# Patient Record
Sex: MALE | Race: WHITE | NOT HISPANIC OR LATINO | Employment: FULL TIME | URBAN - METROPOLITAN AREA
[De-identification: names, ages, dates, MRNs, and addresses within clinical notes are randomized per-mention and may not be internally consistent; named-entity substitution may affect disease eponyms.]

---

## 2017-03-18 ENCOUNTER — HOSPITAL ENCOUNTER (EMERGENCY)
Facility: CLINIC | Age: 32
Discharge: HOME | End: 2017-03-18
Attending: EMERGENCY MEDICINE | Admitting: EMERGENCY MEDICINE
Payer: COMMERCIAL

## 2017-03-18 VITALS
SYSTOLIC BLOOD PRESSURE: 144 MMHG | TEMPERATURE: 97.8 F | BODY MASS INDEX: 25.43 KG/M2 | HEART RATE: 103 BPM | DIASTOLIC BLOOD PRESSURE: 73 MMHG | RESPIRATION RATE: 18 BRPM | HEIGHT: 67 IN | WEIGHT: 162 LBS | OXYGEN SATURATION: 99 %

## 2017-03-18 DIAGNOSIS — F41.0 PANIC ATTACK: ICD-10-CM

## 2017-03-18 LAB — INTERPRETATION ECG - MUSE: NORMAL

## 2017-03-18 PROCEDURE — 99283 EMERGENCY DEPT VISIT LOW MDM: CPT

## 2017-03-18 PROCEDURE — 93005 ELECTROCARDIOGRAM TRACING: CPT

## 2017-03-18 ASSESSMENT — ENCOUNTER SYMPTOMS
VOICE CHANGE: 0
TROUBLE SWALLOWING: 0
SHORTNESS OF BREATH: 1
APNEA: 0
NERVOUS/ANXIOUS: 1

## 2017-03-18 NOTE — ED AVS SNAPSHOT
Emergency Department    [address] [facility] [address]    Phone:  [phone]    Fax:  [phone]                                       Balwinder Perez   MRN: [id number]    Department:   Emergency Department   Date of Visit:  3/18/2017           Patient Information     Date Of Birth          1985        Your diagnoses for this visit were:     Panic attack        You were seen by Jess Henry MD.      Follow-up Information     Follow up with  Emergency Department.    Specialty:  EMERGENCY MEDICINE    Why:  immediately , If symptoms worsen    Contact information:    [address] [facility] [id number] [phone]        Follow up with Magalis Levy DO.    Specialty:  Family Practice    Why:  As needed    Contact information:    [address] [address]  [phone]          Discharge Instructions         Anxiety Reaction  Anxiety is the feeling we all get when we think something bad might happen. It is a normal response to stress and usually causes only a mild reaction. When anxiety becomes more severe, it can interfere with daily life. In some cases, you may not even be aware of what it is you re anxious about. There may also be a genetic link or it may be a learned behavior in the home.  Both psychological and physical triggers cause stress reaction. It's often a response to fear or emotional stress, real or imagined. This stress may come from home, family, work, or social relationships.  During an anxiety reaction, you may feel:    Helpless    Nervous    Depressed    Irritable  Your body may show signs of anxiety in many ways. You may experience:    Dry mouth    Shakiness    Dizziness    Weakness    Trouble breathing    Breathing fast (hyperventilating)    Chest pressure    Sweating    Headache    Nausea    Diarrhea    Tiredness    Inability to sleep    Sexual problems  Home care    Try to locate the  sources of stress in your life. They may not be obvious. These may include:    Daily hassles of life (traffic jams, missed appointments, car troubles, etc.)    Major life changes, both good (new baby, job promotion) and bad (loss of job, loss of loved one)    Overload: feeling that you have too many responsibilities and can't take care of all of them at once    Feeling helpless, feeling that your problems are beyond what you re able to solve    Notice how your body reacts to stress. Learn to listen to your body signals. This will help you take action before the stress becomes severe.    When you can, do something about the source of your stress. (Avoid hassles, limit the amount of change that happens in your life at one time and take a break when you feel overloaded).    Unfortunately, many stressful situations can't be avoided. It is necessary to learn how to better manage stress. There are many proven methods that will reduce your anxiety. These include simple things like exercise, good nutrition and adequate rest. Also, there are certain techniques that are helpful:    Relaxation    Breathing exercises    Visualization    Biofeedback    Meditation  For more information about this, consult your doctor or go to a local bookstore and review the many books and tapes available on this subject.  Follow-up care  If you feel that your anxiety is not responding to self-help measures, contact your doctor or make an appointment with a counselor. You may need short-term psychological counseling and temporary medicine to help you manage stress.  Call 911  Call your healthcare provider right away if any of these occur:    Trouble breathing    Confusion    Drowsiness or trouble wakening    Fainting or loss of consciousness    Rapid heart rate    Seizure    New chest pain that becomes more severe, lasts longer, or spreads into your shoulder, arm, neck, jaw, or back  When to seek medical advice  Call your healthcare provider  right away if any of these occur:    Your symptoms get worse    Severe headache not relieved by rest and mild pain reliever    3761-9176 [employer]. [address] [place] [address], [place] [address]. All rights reserved. This information is not intended as a substitute for professional medical care. Always follow your healthcare professional's instructions.    YOU CAN TRY TAKING AFRIN (OXYMETAZOLINE) FOR YOUR EAR SYMPTOMS          24 Hour Appointment Hotline       To make an appointment at any [facility], call [phone] ([phone]). If you don't have a family doctor or clinic, we will help you find one. [facility] clinics are conveniently located to serve the needs of you and your family.             Review of your medicines      Our records show that you are taking the medicines listed below. If these are incorrect, please call your family doctor or clinic.        Dose / Directions Last dose taken    Multi-vitamin Tabs tablet   Dose:  1 tablet        Take 1 tablet by mouth daily   Refills:  0                Procedures and tests performed during your visit     EKG 12 lead      Orders Needing Specimen Collection     None      Pending Results     Date and Time Order Name Status Description    3/18/2017 2025 EKG 12 lead Preliminary             Pending Culture Results     No orders found from 3/16/2017 to 3/19/2017.             Test Results from your hospital stay            Clinical Quality Measure: Blood Pressure Screening     Your blood pressure was checked while you were in the emergency department today. The last reading we obtained was  BP: 144/73 . Please read the guidelines below about what these numbers mean and what you should do about them.  If your systolic blood pressure (the top number) is less than 120 and your diastolic blood pressure (the bottom number) is less than 80, then your blood pressure is normal. There is nothing more that you need to do about it.  If your systolic blood  pressure (the top number) is 120-139 or your diastolic blood pressure (the bottom number) is 80-89, your blood pressure may be higher than it should be. You should have your blood pressure rechecked within a year by a primary care provider.  If your systolic blood pressure (the top number) is 140 or greater or your diastolic blood pressure (the bottom number) is 90 or greater, you may have high blood pressure. High blood pressure is treatable, but if left untreated over time it can put you at risk for heart attack, stroke, or kidney failure. You should have your blood pressure rechecked by a primary care provider within the next 4 weeks.  If your provider in the emergency department today gave you specific instructions to follow-up with your doctor or provider even sooner than that, you should follow that instruction and not wait for up to 4 weeks for your follow-up visit.        Thank you for choosing [facility]       Thank you for choosing [facility] for your care. Our goal is always to provide you with excellent care. Hearing back from our patients is one way we can continue to improve our services. Please take a few minutes to complete the written survey that you may receive in the mail after you visit with us. Thank you!        [facility] Information     [employer] gives you secure access to your electronic health record. If you see a primary care provider, you can also send messages to your care team and make appointments. If you have questions, please call your primary care clinic.  If you do not have a primary care provider, please call [phone] and they will assist you.        Care EveryWhere ID     This is your Care EveryWhere ID. This could be used by other organizations to access your [facility] medical records  [id number]        After Visit Summary       This is your record. Keep this with you and show to your community pharmacist(s) and doctor(s) at your next visit.

## 2017-03-18 NOTE — ED AVS SNAPSHOT
Emergency Department    [address] [place] [address]    Phone:  [phone]    Fax:  [phone]                                       Balwinder Perez   MRN: [id number]    Department:   Emergency Department   Date of Visit:  3/18/2017           After Visit Summary Signature Page     I have received my discharge instructions, and my questions have been answered. I have discussed any challenges I see with this plan with the nurse or doctor.    ..........................................................................................................................................  Patient/Patient Representative Signature      ..........................................................................................................................................  Patient Representative Print Name and Relationship to Patient    ..................................................               ................................................  Date                                            Time    ..........................................................................................................................................  Reviewed by Signature/Title    ...................................................              ..............................................  Date                                                            Time

## 2017-03-19 NOTE — ED PROVIDER NOTES
History     Chief Complaint:  Allergic Reaction    HPI   Balwinder Perez is a 31 year old male who presents with a possible allergic reaction. The patient states that he was driving, and had a few sips of a Rock[employer] energy drink and some nuts on his way to play soccer.  Suddenly, he developed several symptoms simultaneously.  He felt as though his throat was closing, and felt heart racing as well as lightheadedness.  He also felt short of breath, and developed tingling at the top of his head.  He denies any chest pain or syncope.  He pulled over to the side of road and called 911. The patient contacted EMS and received one dose of IM epi. He did not have rash, itching, or swelling of the mouth, lips or face. He still feels anxious, and felt jittery after the epinephrine.   Otherwise, he has some mild tingling of his head, but otherwise, his symptoms have improved.  He denies any stimulant drug use, or other caffeine today.  He has never had a similar reaction in the past.  [facility], which is a 14 hour time difference.    Allergies:  No Known Drug Allergies      Medications:    The patient is currently on no regular medications.     Past Medical History:    HDL    Past Surgical History:    History reviewed. No pertinent past surgical history.     Family History:    Cancer  Asthma    Social History:  The patient was accompanied to the ED by EMS.  Smoking Status: Former smoker  Smokeless Tobacco: Never used  Alcohol Use: No   Marital Status:   [2]     Review of Systems   HENT: Negative for trouble swallowing and voice change.    Respiratory: Positive for shortness of breath. Negative for apnea.    Cardiovascular: Negative for chest pain.   Skin: Negative for rash.   Psychiatric/Behavioral: The patient is nervous/anxious.    All other systems reviewed and are negative.    Physical Exam   Vitals:  Patient Vitals for the past 24 hrs:   BP Temp Temp src Pulse Heart Rate  "Resp SpO2 Height Weight   03/18/17 2023 144/73 97.8  F (36.6  C) Oral 103 103 22 100 % 1.702 m (5' 7\") 73.5 kg (162 lb)     Physical Exam  Gen: Pleasant, appears stated age.    Eye:   Pupils are equal, round, and reactive.     Sclera non-injected.    ENT:   Moist mucus membranes.     Normal tongue.    Oropharynx without lesions.   Clear TM's bilaterally    Cardiac:     Normal rate and regular rhythm.    No murmurs, gallops, or rubs.    Pulmonary:     Clear to auscultation bilaterally.    No wheezes, rales, or rhonchi.    Abdomen:     Normal active bowel sounds.     Abdomen is soft and non-distended, without focal tenderness.    Musculoskeletal:     Normal movement of all extremities without evidence for deficit.    Extremities:    No edema.    Skin:   Patch of reddened skin over the right scapula, not raised or pruritic.  Otherwise, no rash.    Neurologic:    Non-focal exam without asymmetric weakness or numbness.    Normal tone   Intermittent involuntary jerking motions.    Psychiatric:     Anxious.  Emergency Department Course       EKG demonstrates normal sinus rhythm.  No ST elevation or depression.  Consistent with a normal EKG.    Emergency Department Course:  Nursing notes and vitals reviewed.  I performed an exam of the patient as documented above.   EKG obtained in the ED, see results above.      I discussed the treatment plan with the patient. They expressed understanding of this plan and consented to discharge. They will be discharged home with instructions for care and follow up. In addition, the patient will return to the emergency department if their symptoms persist, worsen, if new symptoms arise or if there is any concern.  All questions were answered.    I personally reviewed the laboratory results with the Patient and answered all related questions prior to discharge.    Impression & Plan      Medical Decision Making:  Balwinder Perez is a 31 year old male who presents to the emergency department today " after a reaction to a Rockstar beverage. On exam, the patient appears somewhat anxious with occasional tremor. Overall, he is well appearing. At this point, I think his symptoms are most consistent with panic attack. This may have been related to the caffeine he ingested with the Rockstar beverage, though it seems that it is a fairly small amount. [place] also [place] and is still adjusting. I do not see any evidence that this was related to an allergic reaction at this time. He also received a dose of epinephrine which likely worsened his anxiety. At this point, given normal EKG, otherwise healthy patient, I think he is safe for discharge home. I recommended avoiding caffeine, resting and trying to return to a regular schedule. He will return with worsening symptoms, chest pain, shortness of breath or other concerns.    Diagnosis:    ICD-10-CM    1. Panic attack F41.0       Disposition:   Discharge to home    Scribe Disclosure:  I, Lawrence Salter, am serving as a scribe at 8:31 PM on 3/18/2017 to document services personally performed by Jess Henry MD, based on my observations and the provider's statements to me.   3/18/2017    EMERGENCY DEPARTMENT       Jess Henry MD  03/19/17 0174

## 2017-03-19 NOTE — ED NOTES
Bed: ED10  Expected date:   Expected time:   Means of arrival:   Comments:  Clementina 2-28M Allergic Reaction

## 2017-03-19 NOTE — ED NOTES
Pt walked to BR and back with ERT without assistance. Pt walked with steady gait and asked for an update on results. Rn notified.

## 2017-03-19 NOTE — DISCHARGE INSTRUCTIONS

## 2017-06-12 ENCOUNTER — OFFICE VISIT (OUTPATIENT)
Dept: FAMILY MEDICINE | Facility: CLINIC | Age: 32
End: 2017-06-12
Payer: COMMERCIAL

## 2017-06-12 VITALS
WEIGHT: 157 LBS | HEIGHT: 67 IN | HEART RATE: 60 BPM | SYSTOLIC BLOOD PRESSURE: 128 MMHG | TEMPERATURE: 97.9 F | DIASTOLIC BLOOD PRESSURE: 72 MMHG | BODY MASS INDEX: 24.64 KG/M2
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
## 2017-06-12 PROCEDURE — 99213 OFFICE O/P EST LOW 20 MIN: CPT | Performed by: FAMILY MEDICINE

## 2017-06-12 NOTE — PROGRESS NOTES
SUBJECTIVE:                                                    Balwinder Perez is a 31 year old male who presents to clinic today for the following health issues:      Concern - tick bite     Onset: noticed a small brown tick last Friday morning.  Not engorged.    Description:   Small red dot on right lower side of rib area/just above hip    Intensity: mild    Progression of Symptoms:  improving    Accompanying Signs & Symptoms:  Small red dot, denies any itching or pain.       Previous history of similar problem:   none    Precipitating factors:   Worsened by: n/a    Alleviating factors:  Improved by: n/a       Therapies Tried and outcome: washed with soap and water.    Additional comments: Patient reports having a 5-10 mm brown wood tick on his right side. He noticed the tick on Friday at 9 am. He states that the tick was not swollen. The day before he was sitting outside on a deck of his mother's home in [place]. He denies any rash or swelling.    Problem list and histories reviewed & adjusted, as indicated.  Additional history: as documented    Patient Active Problem List   Diagnosis     CARDIOVASCULAR SCREENING; LDL GOAL LESS THAN 160     Past Surgical History:   Procedure Laterality Date     NO HISTORY OF SURGERY         Social History   Substance Use Topics     Smoking status: Former Smoker     Quit date: 12/1/2012     Smokeless tobacco: Never Used     Alcohol use No     Family History   Problem Relation Age of Onset     CANCER Father      lung cancer-passed away     Asthma Brother      C.A.D. No family hx of      DIABETES No family hx of      Hypertension No family hx of      CEREBROVASCULAR DISEASE No family hx of          BP Readings from Last 3 Encounters:   06/12/17 128/72   03/18/17 144/73   01/06/16 110/72    Wt Readings from Last 3 Encounters:   06/12/17 157 lb (71.2 kg)   03/18/17 162 lb (73.5 kg)   09/30/14 157 lb 2 oz (71.3 kg)            Reviewed and updated as needed this visit by clinical  "staff  Tobacco  Allergies  Meds  Problems  Med Hx  Surg Hx  Fam Hx  Soc Hx        Reviewed and updated as needed this visit by Provider  Allergies  Meds  Problems         ROS:  Constitutional, HEENT, cardiovascular, pulmonary, GI, , musculoskeletal, neuro, skin, endocrine and psych systems are negative, except as otherwise noted.    This document serves as a record of the services and decisions personally performed and made by Glory Graham DO. It was created on her/his behalf by Evangelina Monroe, a trained medical scribe. The creation of this document is based on the provider's statements to the medical scribe.  Evangelina Monroe June 12, 2017 9:26 AM    OBJECTIVE:                                                    /72 (BP Location: Right arm, Cuff Size: Adult Regular)  Pulse 60  Temp 97.9  F (36.6  C) (Oral)  Ht 5' 7\" (1.702 m)  Wt 157 lb (71.2 kg)  BMI 24.59 kg/m2  Body mass index is 24.59 kg/(m^2).  GENERAL: healthy, alert and no distress  SKIN: 1 mm scab on right side with no rash, non tender  PSYCH: mentation appears normal, affect normal/bright    Diagnostic Test Results:  No results found for this or any previous visit (from the past 24 hour(s)).     ASSESSMENT/PLAN:                                                        ICD-10-CM    1. Tick bite, initial encounter W57.XXXA        I am not concerned about Lyme disease due to the size of the tick and short duration the tick was on the skin. I gave the patient information about Lyme disease transmission.         Glory Graham DO  [facility]    This document serves as a record of the services and decisions personally performed and made by Glory Graham DO. It was created on her behalf by Evangelina Monroe, a trained medical scribe. The creation of this document is based on the provider's statements to the medical scribe.  Evangelina Monroe June 12, 2017 9:26 AM    "

## 2017-06-12 NOTE — PATIENT INSTRUCTIONS
Reassured regarding the likelyhood of transmission of Lyme disease in this setting.   Transmission of Lyme disease is unlikely if the tick is not clearly a larval form or a clearly defined deer tick, if the tick was not engorged or implanted less than 24 hours.  In studies where the tick was on for less than 24 hours the transmission was 0%.   If the tick was likely on more than 24 hrs, and it appears to be a deer tick, in areas of high prevalence of the Lyme bacteria, a single 200mg dose of doxycycline may be indicated to reduce the risk of Lyme disease, if given within 72 hrs of the tick being taken off.

## 2017-06-12 NOTE — MR AVS SNAPSHOT
After Visit Summary   6/12/2017    Balwinder Perez    MRN: [id number]           Patient Information     Date Of Birth          1985        Visit Information        Provider Department      6/12/2017 9:00 AM Glory Graham DO [facility]        Today's Diagnoses     Tick bite, initial encounter    -  1      Care Instructions      Reassured regarding the likelyhood of transmission of Lyme disease in this setting.   Transmission of Lyme disease is unlikely if the tick is not clearly a larval form or a clearly defined deer tick, if the tick was not engorged or implanted less than 24 hours.  In studies where the tick was on for less than 24 hours the transmission was 0%.   If the tick was likely on more than 24 hrs, and it appears to be a deer tick, in areas of high prevalence of the Lyme bacteria, a single 200mg dose of doxycycline may be indicated to reduce the risk of Lyme disease, if given within 72 hrs of the tick being taken off.             Follow-ups after your visit        Your next 10 appointments already scheduled     Jun 26, 2017  8:45 AM CDT   [facility]    [address] [facility] [address]   [phone]            Jun 26, 2017 10:00 AM CDT   [facility]    [address] [facility] [address]   [phone]              Who to contact     If you have questions or need follow up information about today's clinic visit or your schedule please contact [facility] directly at [phone].  Normal or non-critical lab and imaging results will be communicated to you by MyChart, letter or phone within 4 business days after the clinic has received the results. If you do not hear from us within 7 days, please contact the clinic through MyChart or phone. If you have a critical or abnormal  "lab result, we will notify you by phone as soon as possible.  Submit refill requests through [employer] or call your pharmacy and they will forward the refill request to us. Please allow 3 business days for your refill to be completed.          Additional Information About Your Visit        [employer] [facility] Information     [employer] gives you secure access to your electronic health record. If you see a primary care provider, you can also send messages to your care team and make appointments. If you have questions, please call your primary care clinic.  If you do not have a primary care provider, please call [phone] and they will assist you.        Care EveryWhere ID     This is your Care EveryWhere ID. This could be used by other organizations to access your [facility] medical records  [id number]        Your Vitals Were     Pulse Temperature Height BMI (Body Mass Index)          60 97.9  F (36.6  C) (Oral) 5' 7\" (1.702 m) 24.59 kg/m2         Blood Pressure from Last 3 Encounters:   06/12/17 128/72   03/18/17 144/73   01/06/16 110/72    Weight from Last 3 Encounters:   06/12/17 157 lb (71.2 kg)   03/18/17 162 lb (73.5 kg)   09/30/14 157 lb 2 oz (71.3 kg)              Today, you had the following     No orders found for display       Primary Care Provider Office Phone # Fax #    Magalis Levy [phone]       [address] [address]        Thank you!     Thank you for choosing [facility]  for your care. Our goal is always to provide you with excellent care. Hearing back from our patients is one way we can continue to improve our services. Please take a few minutes to complete the written survey that you may receive in the mail after your visit with us. Thank you!             Your Updated Medication List - Protect others around you: Learn how to safely use, store and throw away your medicines at www.disposemymeds.org.          This " list is accurate as of: 6/12/17  9:41 AM.  Always use your most recent med list.                   Brand Name Dispense Instructions for use    Multi-vitamin Tabs tablet      Take 1 tablet by mouth daily

## 2017-06-12 NOTE — NURSING NOTE
"Chief Complaint   Patient presents with     Insect Bites     tick bite       Initial There were no vitals taken for this visit. Estimated body mass index is 25.37 kg/(m^2) as calculated from the following:    Height as of 3/18/17: 5' 7\" (1.702 m).    Weight as of 3/18/17: 162 lb (73.5 kg).  Medication Reconciliation: complete   Shira Patton CMA      "

## 2018-02-03 ENCOUNTER — OFFICE VISIT (OUTPATIENT)
Dept: URGENT CARE | Facility: URGENT CARE | Age: 33
End: 2018-02-03
Payer: COMMERCIAL

## 2018-02-03 VITALS
HEART RATE: 99 BPM | OXYGEN SATURATION: 96 % | TEMPERATURE: 100.8 F | SYSTOLIC BLOOD PRESSURE: 125 MMHG | DIASTOLIC BLOOD PRESSURE: 71 MMHG | WEIGHT: 154.4 LBS | BODY MASS INDEX: 24.18 KG/M2

## 2018-02-03 DIAGNOSIS — J11.1 INFLUENZA-LIKE ILLNESS: Primary | ICD-10-CM

## 2018-02-03 PROCEDURE — 99213 OFFICE O/P EST LOW 20 MIN: CPT | Performed by: PHYSICIAN ASSISTANT

## 2018-02-03 RX ORDER — OMEGA-3 FATTY ACIDS/FISH OIL 300-1000MG
400 CAPSULE ORAL EVERY 4 HOURS PRN
Qty: 40 CAPSULE | Refills: 0 | Status: SHIPPED | OUTPATIENT
Start: 2018-02-03 | End: 2021-07-13

## 2018-02-03 ASSESSMENT — ENCOUNTER SYMPTOMS
NEUROLOGICAL NEGATIVE: 1
GASTROINTESTINAL NEGATIVE: 1
EYES NEGATIVE: 1
PSYCHIATRIC NEGATIVE: 1
CARDIOVASCULAR NEGATIVE: 1
MUSCULOSKELETAL NEGATIVE: 1

## 2018-02-03 NOTE — PROGRESS NOTES
SUBJECTIVE:   Balwinder Perez is a 32 year old male presenting with a chief complaint of   Chief Complaint   Patient presents with     Flu     Patient complains of flu symptoms   .    Onset of symptoms was 4 day(s) ago.  Course of illness is worsening.    Severity moderate  Current and Associated symptoms: fever, cough,chills, stuffy nose, headache  Treatment measures tried include Tylenol/Ibuprofen, cough medicine.  Predisposing factors include None.    This started with symptoms on Tuesday night  Wednesday he could not sleep due to fever/chills and cough  Runny nose and headache  He had a sore throat  He is drinking plenty of fluids  He is using a cough syrup, cough drops and Tylenol  He is exposed to sick coworkers  Fever improves some with Tylenol  He is not short of breath    Review of Systems   Constitutional:        As in HPI   HENT:        As in HPI   Eyes: Negative.    Respiratory:        As in HPI   Cardiovascular: Negative.    Gastrointestinal: Negative.    Genitourinary: Negative.    Musculoskeletal: Negative.    Skin: Negative.    Neurological: Negative.    Psychiatric/Behavioral: Negative.          Past Medical History:   Diagnosis Date     CARDIOVASCULAR SCREENING; LDL GOAL LESS THAN 160 1/25/2013     Current Outpatient Prescriptions   Medication Sig Dispense Refill     ibuprofen 200 MG capsule Take 400 mg by mouth every 4 hours as needed for fever 40 capsule 0     multivitamin, therapeutic with minerals (MULTI-VITAMIN) TABS Take 1 tablet by mouth daily       Social History   Substance Use Topics     Smoking status: Former Smoker     Quit date: 12/1/2012     Smokeless tobacco: Never Used     Alcohol use No       OBJECTIVE  /71 (BP Location: Left arm, Patient Position: Chair, Cuff Size: Adult Regular)  Pulse 99  Temp 100.8  F (38.2  C) (Tympanic)  Wt 154 lb 6.4 oz (70 kg)  SpO2 96%  BMI 24.18 kg/m2    Physical Exam   Constitutional: He is oriented to person, place, and time and well-developed,  well-nourished, and in no distress.   HENT:   Head: Normocephalic and atraumatic.   Right Ear: Tympanic membrane and ear canal normal.   Left Ear: Tympanic membrane and ear canal normal.   Nose: Nose normal.   Mouth/Throat: Uvula is midline and mucous membranes are normal. Posterior oropharyngeal erythema present. No oropharyngeal exudate or posterior oropharyngeal edema.   Eyes: Conjunctivae and EOM are normal. Pupils are equal, round, and reactive to light.   Neck: Normal range of motion. Neck supple.   Cardiovascular: Normal rate, regular rhythm and normal heart sounds.    Pulmonary/Chest: Effort normal and breath sounds normal.   Neurological: He is alert and oriented to person, place, and time. Gait normal.   Skin: Skin is warm and dry.   Psychiatric: Mood and affect normal.       Labs:  No results found for this or any previous visit (from the past 24 hour(s)).        ASSESSMENT:      ICD-10-CM    1. Influenza-like illness R69 ibuprofen 200 MG capsule        Medical Decision Making:    Watch for worsening cough - productive cough  Return for chest XR if cough and fever do not improve  Symptoms are classic for influenza - beyond treatment window with Tamiflu    PLAN:    URI Adult:  Tylenol, Ibuprofen, Fluids and Rest    Followup:    If not improving or if condition worsens, follow up with your Primary Care Provider    There are no Patient Instructions on file for this visit.    Triny Morataya PA-C

## 2018-02-03 NOTE — MR AVS SNAPSHOT
After Visit Summary   2/3/2018    Balwinder Perez    MRN: [id number]           Patient Information     Date Of Birth          1985        Visit Information        Provider Department      2/3/2018 9:55 AM Triny Morataya PA-C [facility]        Today's Diagnoses     Influenza-like illness    -  1       Follow-ups after your visit        Who to contact     If you have questions or need follow up information about today's clinic visit or your schedule please contact [facility] directly at [phone].  Normal or non-critical lab and imaging results will be communicated to you by [employer], letter or phone within 4 business days after the clinic has received the results. If you do not hear from us within 7 days, please contact the clinic through [employer] or phone. If you have a critical or abnormal lab result, we will notify you by phone as soon as possible.  Submit refill requests through [employer] or call your pharmacy and they will forward the refill request to us. Please allow 3 business days for your refill to be completed.          Additional Information About Your Visit        MyChart Information     [employer] gives you secure access to your electronic health record. If you see a primary care provider, you can also send messages to your care team and make appointments. If you have questions, please call your primary care clinic.  If you do not have a primary care provider, please call [phone] and they will assist you.        Care EveryWhere ID     This is your Care EveryWhere ID. This could be used by other organizations to access your [place] medical records  [id number]        Your Vitals Were     Pulse Temperature Pulse Oximetry BMI (Body Mass Index)          99 100.8  F (38.2  C) (Tympanic) 96% 24.18 kg/m2         Blood Pressure from Last 3 Encounters:   02/03/18 125/71   06/12/17 128/72   03/18/17 144/73    Weight from Last 3 Encounters:    02/03/18 154 lb 6.4 oz (70 kg)   06/12/17 157 lb (71.2 kg)   03/18/17 162 lb (73.5 kg)              Today, you had the following     No orders found for display         Today's Medication Changes          These changes are accurate as of 2/3/18 10:34 AM.  If you have any questions, ask your nurse or doctor.               Start taking these medicines.        Dose/Directions    ibuprofen 200 MG capsule   Used for:  Influenza-like illness   Started by:  Triny Morataya PA-C        Dose:  400 mg   Take 400 mg by mouth every 4 hours as needed for fever   Quantity:  40 capsule   Refills:  0            Where to get your medicines      These medications were sent to [facility] - [place] - [address]  [address], [facility] [address]     Phone:  [phone]     ibuprofen 200 MG capsule                Primary Care Provider Office Phone # Fax #    Magalis Dasilva Cam  [phone]       [address] [place] [address]        Equal Access to Services     [facility]: Hadii aad ku hadasho Soomaali, waaxda luqadaha, qaybta kaalmada adeegyada, naif ronquillo . So [facility] [phone].    ATENCIÓN: Si habla español, tiene a mckeon disposición servicios gratuitos de asistencia lingüística. Leonard al [phone].    We comply with applicable federal civil rights laws and [place] laws. We do not discriminate on the basis of race, color, national origin, age, disability, sex, sexual orientation, or gender identity.            Thank you!     Thank you for choosing [facility]  for your care. Our goal is always to provide you with excellent care. Hearing back from our patients is one way we can continue to improve our services. Please take a few minutes to complete the written survey that you may receive in the mail after your visit with us. Thank you!             Your Updated Medication List - Protect others  around you: Learn how to safely use, store and throw away your medicines at www.disposemymeds.org.          This list is accurate as of 2/3/18 10:34 AM.  Always use your most recent med list.                   Brand Name Dispense Instructions for use Diagnosis    ibuprofen 200 MG capsule     40 capsule    Take 400 mg by mouth every 4 hours as needed for fever    Influenza-like illness       Multi-vitamin Tabs tablet      Take 1 tablet by mouth daily

## 2018-02-03 NOTE — NURSING NOTE
"Chief Complaint   Patient presents with     Flu     Patient complains of flu symptoms       Initial /71 (BP Location: Left arm, Patient Position: Chair, Cuff Size: Adult Regular)  Pulse 99  Temp 100.8  F (38.2  C) (Tympanic)  Wt 154 lb 6.4 oz (70 kg)  SpO2 96%  BMI 24.18 kg/m2 Estimated body mass index is 24.18 kg/(m^2) as calculated from the following:    Height as of 6/12/17: 5' 7\" (1.702 m).    Weight as of this encounter: 154 lb 6.4 oz (70 kg).  Medication Reconciliation: complete       Unique Grullon    "

## 2020-03-02 ENCOUNTER — HEALTH MAINTENANCE LETTER (OUTPATIENT)
Age: 35
End: 2020-03-02

## 2020-05-21 ENCOUNTER — MYC MEDICAL ADVICE (OUTPATIENT)
Dept: FAMILY MEDICINE | Facility: CLINIC | Age: 35
End: 2020-05-21

## 2020-05-22 NOTE — TELEPHONE ENCOUNTER
Patient had a virtual visit with a Physician on  [facility] [employer] [facility]. Was prescribed Hydroxyzine 25 mg Capsules three times a day as needed. They recommended a 1 week follow up to discuss medication for anxiety and possible concerns for Thyroid issues. Patient rather follow up with Primary. Primary is out next week and patient could not wait till  her return June 1st. He is scheduled next week with one of her colleagues.

## 2020-08-30 ENCOUNTER — OFFICE VISIT (OUTPATIENT)
Dept: URGENT CARE | Facility: URGENT CARE | Age: 35
End: 2020-08-30
Payer: COMMERCIAL

## 2020-08-30 VITALS
TEMPERATURE: 97.7 F | DIASTOLIC BLOOD PRESSURE: 82 MMHG | WEIGHT: 169 LBS | RESPIRATION RATE: 16 BRPM | HEART RATE: 94 BPM | OXYGEN SATURATION: 99 % | BODY MASS INDEX: 26.47 KG/M2 | SYSTOLIC BLOOD PRESSURE: 129 MMHG

## 2020-08-30 DIAGNOSIS — S16.1XXA STRAIN OF NECK MUSCLE, INITIAL ENCOUNTER: Primary | ICD-10-CM

## 2020-08-30 PROCEDURE — 99213 OFFICE O/P EST LOW 20 MIN: CPT | Performed by: FAMILY MEDICINE

## 2020-08-30 ASSESSMENT — ENCOUNTER SYMPTOMS
SHORTNESS OF BREATH: 0
DIARRHEA: 0
RHINORRHEA: 0
VOMITING: 0
NAUSEA: 0
CHILLS: 0
COUGH: 0
DIAPHORESIS: 0
SORE THROAT: 0
FEVER: 0

## 2020-08-30 ASSESSMENT — PAIN SCALES - GENERAL: PAINLEVEL: NO PAIN (0)

## 2020-08-30 NOTE — PROGRESS NOTES
SUBJECTIVE:   Balwinder Perez is a 34 year old male presenting with a chief complaint of   Chief Complaint   Patient presents with     Neck Problem     noticed the swollen on the right side of neck yesterday, denies pain        He is an established patient of [facility].    Right sided neck swelling since yesterday.  34-year-old male presenting with right-sided neck swelling over the past 24 hours.  He denies any fevers focal pain or lymphadenopathy elsewhere.  Denies any tooth pain recent infection or sore throat.  Denies headache or ear pain.    His past history is otherwise unremarkable denies any previous hospitalizations or surgeries      Social history Works for [employer] has 2 kids he is  denies any drug or alcohol use.    Review of Systems   Constitutional: Negative for chills, diaphoresis and fever.   HENT: Negative for congestion, ear pain, rhinorrhea and sore throat.    Respiratory: Negative for cough and shortness of breath.    Gastrointestinal: Negative for diarrhea, nausea and vomiting.     Patient Active Problem List   Diagnosis     CARDIOVASCULAR SCREENING; LDL GOAL LESS THAN 160        Past Medical History:   Diagnosis Date     CARDIOVASCULAR SCREENING; LDL GOAL LESS THAN 160 2013     Family History   Problem Relation Age of Onset     Cancer Father         lung cancer-passed away     Asthma Brother      C.A.D. No family hx of      Diabetes No family hx of      Hypertension No family hx of      Cerebrovascular Disease No family hx of      Current Outpatient Medications   Medication Sig Dispense Refill     multivitamin, therapeutic with minerals (MULTI-VITAMIN) TABS Take 1 tablet by mouth daily       ibuprofen 200 MG capsule Take 400 mg by mouth every 4 hours as needed for fever (Patient not taking: Reported on 2020) 40 capsule 0     Social History     Tobacco Use     Smoking status: Former Smoker     Last attempt to quit: 2012     Years since quittin.7     Smokeless tobacco:  Never Used   Substance Use Topics     Alcohol use: No       OBJECTIVE  /82 (BP Location: Left arm, Patient Position: Sitting, Cuff Size: Adult Large)   Pulse 94   Temp 97.7  F (36.5  C) (Tympanic)   Resp 16   Wt 76.7 kg (169 lb)   SpO2 99%   BMI 26.47 kg/m      Physical Exam  HENT:      Head: Normocephalic and atraumatic.      Right Ear: External ear normal.      Left Ear: External ear normal.      Nose: Nose normal.      Mouth/Throat:      Pharynx: No oropharyngeal exudate.   Eyes:      General: No scleral icterus.        Right eye: No discharge.         Left eye: No discharge.      Conjunctiva/sclera: Conjunctivae normal.      Pupils: Pupils are equal, round, and reactive to light.   Neck:      Musculoskeletal: Full passive range of motion without pain and neck supple. Normal range of motion. Muscular tenderness (Appears to have focal muscle tenderness over the right sternocleidomastoid primarily without any adjacent adenopathy) present. No edema, erythema, neck rigidity, crepitus, injury, pain with movement or torticollis.      Thyroid: No thyromegaly.      Trachea: Trachea normal. No tracheal deviation.   Cardiovascular:      Rate and Rhythm: Normal rate and regular rhythm.      Heart sounds: Normal heart sounds. No murmur. No friction rub. No gallop.    Pulmonary:      Effort: Pulmonary effort is normal. No respiratory distress.      Breath sounds: Normal breath sounds. No stridor. No wheezing or rales.   Chest:      Chest wall: No tenderness.   Abdominal:      General: Bowel sounds are normal. There is no distension.      Palpations: Abdomen is soft. There is no mass.      Tenderness: There is no abdominal tenderness. There is no guarding or rebound.   Musculoskeletal:         General: No tenderness or deformity.   Lymphadenopathy:      Cervical: No cervical adenopathy.      Right cervical: No superficial, deep or posterior cervical adenopathy.     Left cervical: No superficial, deep or posterior  cervical adenopathy.   Skin:     General: Skin is warm and dry.      Findings: No erythema or rash.   Neurological:      Mental Status: He is alert and oriented to person, place, and time.      Cranial Nerves: No cranial nerve deficit.   Psychiatric:         Judgment: Judgment normal.           ASSESSMENT:    ICD-10-CM    1. Strain of neck muscle, initial encounter  S16.1XXA           PLAN:  His exam is most consistent with a neck strain.  Upon questioning he does mention starting a new regimen of sit ups which he tends not to do.  this is likely placing some levering or strain on his sternocleidomastoid muscle and adjacent muscles on the right lateral side of his neck.  I expect this to resolve with rest.  I encouraged him to remain active but to increase his activity slowly over time as to avoid any further muscle soreness.  when possible.  I did mention mild muscle soreness as a part of normal exercise and instrumental and building muscle.  The patient indicates understanding of these issues and agrees with the plan.   Memo Rosas MD

## 2020-09-03 ENCOUNTER — OFFICE VISIT (OUTPATIENT)
Dept: INTERNAL MEDICINE | Facility: CLINIC | Age: 35
End: 2020-09-03
Payer: COMMERCIAL

## 2020-09-03 VITALS
SYSTOLIC BLOOD PRESSURE: 114 MMHG | HEART RATE: 84 BPM | WEIGHT: 172 LBS | RESPIRATION RATE: 18 BRPM | BODY MASS INDEX: 26.94 KG/M2 | DIASTOLIC BLOOD PRESSURE: 76 MMHG | OXYGEN SATURATION: 99 % | TEMPERATURE: 98.4 F

## 2020-09-03 DIAGNOSIS — R22.1 LOCALIZED SWELLING, MASS AND LUMP, NECK: Primary | ICD-10-CM

## 2020-09-03 DIAGNOSIS — Z87.438 HISTORY OF EPIDIDYMITIS: ICD-10-CM

## 2020-09-03 PROCEDURE — 99214 OFFICE O/P EST MOD 30 MIN: CPT | Performed by: INTERNAL MEDICINE

## 2020-09-03 NOTE — PROGRESS NOTES
Subjective     Balwinder Perez is a 34 year old male who presents to clinic today for the following health issues:    HPI   Chief Complaint   Patient presents with     Neck Pain     swollen, painful, radiating up to head, with headaches     Referral     urology         Localized swelling, mass and lump, neck  History of epididymitis     New patient to the Internal Medicine department has a few questions.    3-4 days ago he has developed a right sided itchy throat, not really a sore but more of a scratchy feeling, seems to him a mechanical problem, secondary to something is swollen. Right side seems just a little bit swollen to him/ some headache feeling along the right side of his scalp up to above and below his right ear and some into his back of the neck. Seen at urgent care clinic and was diagnosed with a musculoskeletal cause. Patient found this diagnosis as odd because he has essentially normal range of motion of his neck with no pain or loss of the normal range of motion , etc..    No actual swelling and swallowing per se although when he turns his head in a certain direction he thinks the posterior aspect of the right sided sternocleidomastoid muscle is showing some kind of swelling. Negative for sinus or sneezing. Maybe some throat clearing  Stuff but quite nominal symptoms and spouse with no symptoms     A separate and an unrelated matter is a recurrent testicular concern. It was 6 years ago he was noted with some right sided testicular cyst. He went so far as to have a testicular ultrasound . We reviewed this older test results which showed some entirely benign findings.      Review of Systems   Constitutional, HEENT, cardiovascular, pulmonary, gi and gu systems are negative, except as otherwise noted.      Objective    /76   Pulse 84   Temp 98.4  F (36.9  C) (Oral)   Resp 18   Wt 78 kg (172 lb)   SpO2 99%   BMI 26.94 kg/m    Body mass index is 26.94 kg/m .  Physical Exam   GENERAL: healthy, alert  and no distress  NECK: no adenopathy, no asymmetry, masses, or scars and thyroid normal to palpation, we did a detailed exam of neck with normal range of motion and essentially no findings whatsoever except for the finding of an asymmetry quality to the sternocleidomastoid muscle with right side showing a certain puffiness versus possibly a mass suhc as a lipoma associated with the sternocleidomastoid muscle belly . This is a minor and somewhat nonspecific finding but we do see this and when I put some pressure right on this area patient comments that this alisson to worsen his neck symptoms   RESP: lungs clear to auscultation - no rales, rhonchi or wheezes  CV: regular rate and rhythm, normal S1 S2, no S3 or S4, no murmur, click or rub, no peripheral edema and peripheral pulses strong  ABDOMEN: soft, nontender, no hepatosplenomegaly, no masses and bowel sounds normal  MS: no gross musculoskeletal defects noted, no edema        Assessment & Plan     Localized swelling, mass and lump, neck  We will exclude any type of mass / process affecting the area as detailed above   - CT Soft Tissue Neck w Contrast; Future    History of epididymitis  He seeks a referral back to Dr. Diego Ricks urologist with [facility]  Who saw him before.   - UROLOGY ADULT REFERRAL; Future      Return in about 1 year (around 9/3/2021), or if symptoms worsen or fail to improve.    Hugo Laughlin MD  [facility]

## 2020-09-09 ENCOUNTER — TELEPHONE (OUTPATIENT)
Dept: FAMILY MEDICINE | Facility: CLINIC | Age: 35
End: 2020-09-09

## 2020-09-09 ENCOUNTER — ANCILLARY PROCEDURE (OUTPATIENT)
Dept: CT IMAGING | Facility: CLINIC | Age: 35
End: 2020-09-09
Attending: INTERNAL MEDICINE
Payer: COMMERCIAL

## 2020-09-09 DIAGNOSIS — R22.1 LOCALIZED SWELLING, MASS AND LUMP, NECK: ICD-10-CM

## 2020-09-09 PROCEDURE — 70491 CT SOFT TISSUE NECK W/DYE: CPT | Mod: TC

## 2020-09-09 RX ORDER — IOPAMIDOL 755 MG/ML
80 INJECTION, SOLUTION INTRAVASCULAR ONCE
Status: COMPLETED | OUTPATIENT
Start: 2020-09-09 | End: 2020-09-09

## 2020-09-09 RX ADMIN — IOPAMIDOL 80 ML: 755 INJECTION, SOLUTION INTRAVASCULAR at 08:24

## 2020-09-10 NOTE — TELEPHONE ENCOUNTER
"Noted that patient has reviewed mychart result notes    \"Viewed by Balwinder Perez on 9/9/2020  6:41 PM\"    LM on patient's VM advising that CT was non concerning and results are viewable on mychart  Asked him to call back or write a [employer] message if he has any further questions    Kacie Bolivar RN    "

## 2020-09-10 NOTE — TELEPHONE ENCOUNTER
This is a completely benign neck CT scan. What we see is a slight asymmetry with the sternocleidomastoid muscle as we discussed at your appointment . There are no findings of concern here. This is a harmless finding and no further follow up is necessary     Hugo Laughlin MD

## 2020-09-15 ENCOUNTER — VIRTUAL VISIT (OUTPATIENT)
Dept: FAMILY MEDICINE | Facility: CLINIC | Age: 35
End: 2020-09-15
Payer: COMMERCIAL

## 2020-09-15 DIAGNOSIS — F41.1 GAD (GENERALIZED ANXIETY DISORDER): Primary | ICD-10-CM

## 2020-09-15 DIAGNOSIS — M54.2 CERVICALGIA: ICD-10-CM

## 2020-09-15 PROCEDURE — 99213 OFFICE O/P EST LOW 20 MIN: CPT | Mod: 95 | Performed by: PHYSICIAN ASSISTANT

## 2020-09-15 NOTE — PATIENT INSTRUCTIONS
Patient Education     Your Body s Response to Anxiety    Normal anxiety is part of the body s natural defense system. It's an alert to a threat that is unknown, vague, or comes from your own internal fears. While you re in this state, your feelings can range from a vague sense of worry to physical sensations such as a pounding heartbeat. These feelings make you want to react to the threat. An anxiety response is normal in many situations. But when you have an anxiety disorder, the same response can occur at the wrong times.  Anxiety can be helpful  Normal anxiety is a signal from your brain that warns you of a threat and is a normal response to help you prevent something or decrease the bad effects of something you can't control. For example, anxiety is a normal response to situations that might damage your body, separate you from a loved one, or lose your job. The symptoms of anxiety can be physical and mental.  How does it feel?  At certain times, people with anxiety may have:    Dizziness    Muscle tension or pain    Restlessness    Sleeplessness    Trouble concentrating    Racing heartbeat    Fast breathing    Shaking or trembling    Stomachache    Diarrhea    Loss of energy    Sweating    Cold, clammy hands    Chest pain    Dry mouth  Anxiety can also be a problem  Anxiety can become a problem when it is hard to control, occurs for months, and interferes with important parts of your life. With an anxiety disorder, your body has the response described above, but in inappropriate ways. The response a person has depends on the anxiety disorder he or she has. With some disorders, the anxiety is way out of proportion to the threat that triggers it. With others, anxiety may occur even when there isn t a clear threat or trigger.  Who does it affect?  Some people are more prone to persistent anxiety than others. It tends to run in families, and it affects more younger people than older people, and more women than  "men. But no age, race, or gender is immune to anxiety problems.  Anxiety can be treated  The good news is that the anxiety that s disrupting your life can be treated. Check with your healthcare provider and rule out any physical problems that may be causing the anxiety symptoms. If an anxiety disorder is diagnosed seek mental healthcare. This is an illness and it can respond to treatment. Most types of anxiety disorders will respond to \"talk therapy\" and medicines. Working with your doctor or other healthcare provider, you can develop skills to help you cope with anxiety. You can also gain the perspective you need to overcome your fears. Note: Good sources of support or guidance can be found at your local hospital, mental health clinic, or an employee assistance program.  How to cope with anxiety  If anxiety is wearing you down, here are some things you can do to cope:    Keep in mind that you can t control everything about a situation. Change what you can and let the rest take its course.    Exercise--it s a great way to relieve tension and help your body feel relaxed.    Avoid caffeine and nicotine, which can make anxiety symptoms worse.    Fight the temptation to turn to alcohol or unprescribed drugs for relief. They only make things worse in the long run.    Educate yourself about anxiety disorders. Keep track of helpful online resources and books you can use during stressful periods.    Try stress management techniques such as meditation.    Consider online or in-person support groups.   Date Last Reviewed: 1/1/2017 2000-2019 [employer]. [address] [place] [address], [place] [address]. All rights reserved. This information is not intended as a substitute for professional medical care. Always follow your healthcare professional's instructions.           "

## 2020-09-15 NOTE — PROGRESS NOTES
"Balwinder Perez is a 34 year old male who is being evaluated via a billable video visit.      The patient has been notified of following:     \"This video visit will be conducted via a call between you and your physician/provider. We have found that certain health care needs can be provided without the need for an in-person physical exam.  This service lets us provide the care you need with a video conversation.  If a prescription is necessary we can send it directly to your pharmacy.  If lab work is needed we can place an order for that and you can then stop by our lab to have the test done at a later time.    Video visits are billed at different rates depending on your insurance coverage.  Please reach out to your insurance provider with any questions.    If during the course of the call the physician/provider feels a video visit is not appropriate, you will not be charged for this service.\"    Patient has given verbal consent for Video visit? Yes  How would you like to obtain your AVS? MyChart  If you are dropped from the video visit, the video invite should be resent to: Send to e-mail at: [email or web address]  Will anyone else be joining your video visit? No    Subjective     Balwinder Perez is a 34 year old male who presents today via video visit for the following health issues:    HPI    Patient presents to review CT results which are negative.  Continues to have head and neck discomfort.  When asked if Patient has a hx of anxiety he notes he was given Atarax by an online clinician 2 months ago.  He is amenable to starting treatment today.    Review of Systems   Constitutional, HEENT, cardiovascular, pulmonary, gi and gu systems are negative, except as otherwise noted.      Objective           Vitals:  No vitals were obtained today due to virtual visit.    Physical Exam     GENERAL: healthy, alert, fatigued and anxious  EYES: Eyes grossly normal to inspection.  No discharge or erythema, or obvious scleral/conjunctival " abnormalities.  RESP: No audible wheeze, cough, or visible cyanosis.  No visible retractions or increased work of breathing.    SKIN: Visible skin clear. No significant rash, abnormal pigmentation or lesions.  NEURO: Cranial nerves grossly intact.  Mentation and speech appropriate for age.  PSYCH: Mentation appears normal, affect normal/bright, judgement and insight intact, normal speech and appearance well-groomed.              Assessment & Plan     Balwinder was seen today for results.    Diagnoses and all orders for this visit:    JIMMY (generalized anxiety disorder)  -     MENTAL HEALTH REFERRAL  - Adult; Outpatient Treatment; Individual/Couples/Family/Group Therapy/Health Psychology; G[facility] [phone]; We will contact you to schedule the appointment or please call with any questions  -     sertraline (ZOLOFT) 50 MG tablet; Take 1 tablet (50 mg) by mouth daily    Cervicalgia           Ice 20 min 4 x per day the nape of the neck.  Continue with medication management as per previous.  Follow up if symptoms should persist, change or worsen. Consider Physical Therapy then if warranted.       Return in about 2 months (around 11/15/2020) for Video Visit.    Tahmina Schafer PA-C  [facility]      Video-Visit Details    Type of service:  Video Visit    Video End Time:15 Minutes    Originating Location (pt. Location): Home    Distant Location (provider location):  [facility]     Platform used for Video Visit: Cathryn

## 2020-09-24 ENCOUNTER — VIRTUAL VISIT (OUTPATIENT)
Dept: UROLOGY | Facility: CLINIC | Age: 35
End: 2020-09-24
Payer: COMMERCIAL

## 2020-09-24 VITALS
WEIGHT: 170 LBS | DIASTOLIC BLOOD PRESSURE: 76 MMHG | SYSTOLIC BLOOD PRESSURE: 114 MMHG | BODY MASS INDEX: 25.76 KG/M2 | HEIGHT: 68 IN

## 2020-09-24 DIAGNOSIS — R10.31 GROIN PAIN, RIGHT: Primary | ICD-10-CM

## 2020-09-24 PROCEDURE — 99203 OFFICE O/P NEW LOW 30 MIN: CPT | Mod: 95 | Performed by: UROLOGY

## 2020-09-24 ASSESSMENT — MIFFLIN-ST. JEOR: SCORE: 1685.61

## 2020-09-24 NOTE — PROGRESS NOTES
"Balwinder Perez is a 34 year old male who is being evaluated via a billable video visit.      The patient has been notified of following:     \"This video visit will be conducted via a call between you and your physician/provider. We have found that certain health care needs can be provided without the need for an in-person physical exam.  This service lets us provide the care you need with a video conversation.  If a prescription is necessary we can send it directly to your pharmacy.  If lab work is needed we can place an order for that and you can then stop by our lab to have the test done at a later time.    Video visits are billed at different rates depending on your insurance coverage.  Please reach out to your insurance provider with any questions.    If during the course of the call the physician/provider feels a video visit is not appropriate, you will not be charged for this service.\"    Patient has given verbal consent for Video visit? Yes  How would you like to obtain your AVS? MyChart  If you are dropped from the video visit, the video invite should be resent to: Text to cell phone: [phone]  Will anyone else be joining your video visit? No        Video-Visit Details    Type of service:  Video Visit    Video Start Time: 1308  Video End Time: 1:18 PM    Originating Location (pt. Location): Home    Distant Location (provider location):  [facility]     Platform used for Video Visit: Ric Ricks MD        "

## 2020-09-24 NOTE — PROGRESS NOTES
S: Patient is a pleasant 34-year-old male who was request be seen by Dr. Renny Morales for a consultation with regard to patient's right groin pain.  His right groin pain started about a month ago and lasted about a day and since then has resolved.  He has similar problems in the past.  He has had negative urological work-up previously.  Scrotal sound showed epididymal cyst mainly.  He denies any problems with urination.  He has no bowel issues.  He remembered from the last visit that suggested his groin pain is due to pelvic muscle therefore he took a hot bath and that seemed to relieve the discomfort.  Current Outpatient Medications   Medication Sig Dispense Refill     cyclobenzaprine (FLEXERIL) 10 MG tablet Take 1 tablet (10 mg) by mouth 3 times daily as needed for muscle spasms 30 tablet 1     ibuprofen 200 MG capsule Take 400 mg by mouth every 4 hours as needed for fever 40 capsule 0     multivitamin, therapeutic with minerals (MULTI-VITAMIN) TABS Take 1 tablet by mouth daily       sertraline (ZOLOFT) 50 MG tablet Take 1 tablet (50 mg) by mouth daily 30 tablet 1     No Known Allergies  Past Medical History:   Diagnosis Date     CARDIOVASCULAR SCREENING; LDL GOAL LESS THAN 160 1/25/2013     Past Surgical History:   Procedure Laterality Date     NO HISTORY OF SURGERY        Family History   Problem Relation Age of Onset     Thyroid Disease Mother      Cancer Father         lung cancer-passed away     Other Cancer Father         Lung     Asthma Brother      C.A.D. No family hx of      Diabetes No family hx of      Hypertension No family hx of      Cerebrovascular Disease No family hx of      Social History     Socioeconomic History     Marital status:      Spouse name: None     Number of children: None     Years of education: None     Highest education level: None   Occupational History     None   Social Needs     Financial resource strain: None     Food insecurity     Worry: None     Inability: None      Transportation needs     Medical: None     Non-medical: None   Tobacco Use     Smoking status: Former Smoker     Packs/day: 0.00     Years: 0.00     Pack years: 0.00     Last attempt to quit: 2012     Years since quittin.8     Smokeless tobacco: Never Used   Substance and Sexual Activity     Alcohol use: No     Drug use: No     Sexual activity: Yes     Partners: Female   Lifestyle     Physical activity     Days per week: None     Minutes per session: None     Stress: None   Relationships     Social connections     Talks on phone: None     Gets together: None     Attends Sabianist service: None     Active member of club or organization: None     Attends meetings of clubs or organizations: None     Relationship status: None     Intimate partner violence     Fear of current or ex partner: None     Emotionally abused: None     Physically abused: None     Forced sexual activity: None   Other Topics Concern     Parent/sibling w/ CABG, MI or angioplasty before 65F 55M? No   Social History Narrative     None       REVIEW OF SYSTEMS  =================  C: NEGATIVE for fever, chills, change in weight  I: NEGATIVE for worrisome rashes, moles or lesions  E/M: NEGATIVE for ear, mouth and throat problems  R: NEGATIVE for significant cough or SHORTNESS OF BREATH  CV:  NEGATIVE for chest pain, palpitations or peripheral edema  GI: NEGATIVE for nausea, abdominal pain, heartburn, or change in bowel habits  NEURO: NEGATIVE numbness/weakness  : see HPI  PSYCH: NEGATIVE depression/anxiety  LYmph: no new enlarged lymph nodes  Ortho: no new trauma/movements      Physical Exam:  GENERAL: Healthy, alert and no distress  EYES: Eyes grossly normal to inspection.  No discharge or erythema, or obvious scleral/conjunctival abnormalities.  RESP: No audible wheeze, cough, or visible cyanosis.  No visible retractions or increased work of breathing.    SKIN: Visible skin clear. No significant rash, abnormal pigmentation or  lesions.  NEURO: Cranial nerves grossly intact.  Mentation and speech appropriate for age.  PSYCH: Mentation appears normal, affect normal/bright, judgement and insight intact, normal speech and appearance well-groomed.    Assessment/Plan:   34-year-old male with right groin pain which has resolved.  Previous scrotal/epididymal cysts which are benign.  Patient was reassured.  Follow-up as needed.  See me again if pain recurs.  Physical therapy discussed briefly.

## 2020-10-02 ENCOUNTER — VIRTUAL VISIT (OUTPATIENT)
Dept: FAMILY MEDICINE | Facility: CLINIC | Age: 35
End: 2020-10-02
Payer: COMMERCIAL

## 2020-10-02 DIAGNOSIS — M54.2 CERVICALGIA: Primary | ICD-10-CM

## 2020-10-02 PROCEDURE — 99213 OFFICE O/P EST LOW 20 MIN: CPT | Mod: 95 | Performed by: NURSE PRACTITIONER

## 2020-10-02 NOTE — PROGRESS NOTES
"Balwinder Perez is a 35 year old male who is being evaluated via a billable video visit.      The patient has been notified of following:     \"This video visit will be conducted via a call between you and your physician/provider. We have found that certain health care needs can be provided without the need for an in-person physical exam.  This service lets us provide the care you need with a video conversation.  If a prescription is necessary we can send it directly to your pharmacy.  If lab work is needed we can place an order for that and you can then stop by our lab to have the test done at a later time.    Video visits are billed at different rates depending on your insurance coverage.  Please reach out to your insurance provider with any questions.    If during the course of the call the physician/provider feels a video visit is not appropriate, you will not be charged for this service.\"    Patient has given verbal consent for Video visit? Yes  How would you like to obtain your AVS? MyChart  If you are dropped from the video visit, the video invite should be resent to: Text to cell phone: [phone]  Will anyone else be joining your video visit? No    Subjective     Balwinder Perez is a 35 year old male who presents today via video visit for the following health issues:    HPI     Discuss CT results that were done 09/09/2020. Patient would like to discuss getting a referral for PT due to the ongoing muscle concerns.    Ayana Bansal, [facility]      Video Start Time: 8:49 AM        Review of Systems   CONSTITUTIONAL: NEGATIVE for fever, chills, change in weight  ENT/MOUTH: NEGATIVE for ear, mouth and throat problems  RESP: NEGATIVE for significant cough or SOB  CV: NEGATIVE for chest pain, palpitations or peripheral edema  MUSCULOSKELETAL: POSITIVE  for is still feeling muscle soreness, is still having difficulty with focusing while working as he is feeling pressure on the side of his head.   Did get a new desk - did " have poor home work area.  Is trying to do stretching exercises - this does help a little but still has the right sided neck pain with occasionally felt on the left side , pain will radiate to the temporal areas.        Objective           Vitals:  No vitals were obtained today due to virtual visit.    Physical Exam     GENERAL: Healthy, alert and no distress  EYES: Eyes grossly normal to inspection.  No discharge or erythema, or obvious scleral/conjunctival abnormalities.  RESP: No audible wheeze, cough, or visible cyanosis.  No visible retractions or increased work of breathing.    MS: does have good ROM with the but does have pain with flexion that will radiate to the temporal areas.  Rotation appeared normal.     SKIN: Visible skin clear. No significant rash, abnormal pigmentation or lesions.  NEURO: Cranial nerves grossly intact.  Mentation and speech appropriate for age.  PSYCH: Mentation appears normal, affect normal/bright, judgement and insight intact, normal speech and appearance well-groomed.      Reviewed previous MRI        ASSESSMENT/PLAN:      ICD-10-CM    1. Cervicalgia  M54.2 [facility]       Patient Instructions   You do have a referral to [facility],   Please call for an appointment    Continue to do your stretching exercises,   You can try applying heat or ice to the area   Massage in Asper cream or an other anti inflammatory cream     Try to make your work area as ergonomically correct as possible.   Stretch frequently during your work day     Have a Happy Birthday               Video-Visit Details    Type of service:  Video Visit    Video End Time:8:59 AM    Originating Location (pt. Location): Home    Distant Location (provider location):  [facility]     Platform used for Video Visit: Ric

## 2020-10-02 NOTE — PATIENT INSTRUCTIONS
You do have a referral to [facility],   Please call for an appointment    Continue to do your stretching exercises,   You can try applying heat or ice to the area   Massage in Asper cream or an other anti inflammatory cream     Try to make your work area as ergonomically correct as possible.   Stretch frequently during your work day     Have a Happy Birthday

## 2020-10-05 ENCOUNTER — THERAPY VISIT (OUTPATIENT)
Dept: PHYSICAL THERAPY | Facility: CLINIC | Age: 35
End: 2020-10-05
Attending: NURSE PRACTITIONER
Payer: COMMERCIAL

## 2020-10-05 DIAGNOSIS — M54.2 NECK PAIN: ICD-10-CM

## 2020-10-05 PROCEDURE — 97110 THERAPEUTIC EXERCISES: CPT | Mod: GP | Performed by: PHYSICAL THERAPIST

## 2020-10-05 PROCEDURE — 97530 THERAPEUTIC ACTIVITIES: CPT | Mod: GP | Performed by: PHYSICAL THERAPIST

## 2020-10-05 PROCEDURE — 97161 PT EVAL LOW COMPLEX 20 MIN: CPT | Mod: GP | Performed by: PHYSICAL THERAPIST

## 2020-10-05 NOTE — LETTER
"[facility]  [address]  [address]  [place] [address]  [phone]    2020    Re: Balwinder Perez   :   1985  MRN:  [id number]   REFERRING PHYSICIAN:   Cortney Salazar, [facility]  Date of Initial Evaluation:  10/05/2020  Visits:  Rxs Used: 1  Reason for Referral:  Neck pain    EVALUATION SUMMARY    [facility] Initial Evaluation  Physical Therapy Initial Examination/Evaluation 2020   Balwinder Perez is a 35 year old male referred to physical therapy by  Cortney Salazar for treatment of Cervicalgia  with Precautions/Restrictions/MD instructions E&T   Therapist Assessment:   Clinical Impression: Pt presents to [facility] with primary complaint of head and neck pain, especially on R sternocleidomastoid.  Per clinical examination, pt does have limitations in cervical ROM.   Pt will benefit from skilled physical therapy for stretching and postural stabilization program with education on proper body mechanics for home office.   Subjective: In early September, felt something \"off\" in his neck. He noticed a lump on the R side of his neck. He had a CT scan of neck and SCM mm is inflamed. MD feels it is posture related. He feels pain and pressure in his head. Patient has been working from home since March. He did buy a stand-up desk because he knows his ergonomic set-up for home is not ideal.   DOI/onset: MD order 10/02/2020   Mechanism of injury: none   DOS NA   Related PMH: NA Previous treatment: NA   Imaging: CT scan    Chief Complaint: neck and head pain    Pain: rest 0 /10, activity 8/10  Described as: pressure  Alleviated by: muscle relaxant, icy hot, NSAIDS Exacerbated by: prolonged positioning  Progression of symptoms since initial onset: worsening  Time of day when pain is worse: end of day   Sleeping: needs mm relaxant  ; sleeps with flat pillow on his back   Social history:  with 2 kids; son in  , " daughter is 2  ; wife is a nurse but currently able to work from home  Occupation: Finance Job duties: computer work    Current HEP/exercise regimen: YouTube exercises for SCM stretches, stays active with kids   Patient's goals are decrease pain    Other pertinent PMH: none  General health as reported by patient: Excellent    Return to MD: prn      Cervical Spine Evaluation    Posture  Forward Head:  + Rounded Shoulders: +      Range of Motion  Flexion: WNL  Extension: WNL  Sidebend Left: 24  Right: 36  Rotation Left: 30  Right: 40  Protraction: WNL  Retraction: WNL-good posture  Upper extremity screen: WNL    Upper Extremity Strength  Shoulder MMT Flexion Scaption ER IR   Left 5/5 5/5 5/5 5/5   Right 5/5 5/5 5/5 5/5     Special Tests  Spurling's: Neg  Reflexes: NT  Dermatomes: WNL  Myotomes: WNL    Palpation  Sensitive at occiput; tight upper trap R>L       Assessment/Plan:  Patient is a 35 year old male with cervical and head complaints.    Patient has the following significant findings with corresponding treatment plan.                Diagnosis 1:  Cervicalgia  Pain -  hot/cold therapy, manual therapy, self management, education and home program  Decreased ROM/flexibility - manual therapy, therapeutic exercise, therapeutic activity and home program  Impaired muscle performance - neuro re-education and home program  Decreased function - therapeutic activities and home program  Impaired posture - neuro re-education, therapeutic activities and home program    Re: Balwinder Perez   :   1985    Therapy Evaluation Codes:   1) History comprised of:   Personal factors that impact the plan of care:      Living environment, Past/current experiences, Profession, Time since onset of symptoms and Work status.    Comorbidity factors that impact the plan of care are:      None.     Medications impacting care: Muscle relaxant.  2) Examination of Body Systems comprised of:   Body structures and functions that impact the plan  of care:      Cervical spine and Head.   Activity limitations that impact the plan of care are:      Bathing, Dressing, Sports, Working and Sleeping.  3) Clinical presentation characteristics are:   Stable/Uncomplicated.  4) Decision-Making    Low complexity using standardized patient assessment instrument and/or measureable assessment of functional outcome.  Cumulative Therapy Evaluation is: Low complexity.    Previous and current functional limitations:  (See Goal Flow Sheet for this information)    Short term and Long term goals: (See Goal Flow Sheet for this information)     Communication ability:  Patient appears to be able to clearly communicate and understand verbal and written communication and follow directions correctly.  Treatment Explanation - The following has been discussed with the patient:   RX ordered/plan of care  Anticipated outcomes  Possible risks and side effects  This patient would benefit from PT intervention to resume normal activities.   Rehab potential is good.    Frequency:  1 X week, once daily  Duration:  for 4 weeks  Discharge Plan:  Achieve all LTG.  Independent in home treatment program.  Reach maximal therapeutic benefit.    Please refer to the daily flowsheet for treatment today, total treatment time and time spent performing 1:1 timed codes.     Thank you for your referral.    INQUIRIES  Therapist: Kinza Nj, PT DPT  [facility]  [address]  [address]  [place] [address]  Phone: [phone]  Fax: [phone]

## 2020-10-05 NOTE — PROGRESS NOTES
"[facility] Initial Evaluation  Subjective:  HPI                    Objective:  System    Physical Exam    General     ROS     Physical Therapy Initial Examination/Evaluation October 5, 2020   Balwinder Perez is a 35 year old male referred to physical therapy by  Cortney Salazar for treatment of Cervicalgia  with Precautions/Restrictions/MD instructions E&T   Therapist Assessment:   Clinical Impression: Pt presents to [facility] with primary complaint of head and neck pain, especially on R sternocleidomastoid.  Per clinical examination, pt does have limitations in cervical ROM.   Pt will benefit from skilled physical therapy for stretching and postural stabilization program with education on proper body mechanics for home office.      Subjective: In early September, felt something \"off\" in his neck. He noticed a lump on the R side of his neck. He had a CT scan of neck and SCM mm is inflamed. MD feels it is posture related. He feels pain and pressure in his head. Patient has been working from home since March. He did buy a stand-up desk because he knows his ergonomic set-up for home is not ideal.   DOI/onset: MD order 10/02/2020   Mechanism of injury: none   DOS NA   Related PMH: NA Previous treatment: NA   Imaging: CT scan    Chief Complaint: neck and head pain    Pain: rest 0 /10, activity 8/10  Described as: pressure  Alleviated by: muscle relaxant, icy hot, NSAIDS Exacerbated by: prolonged positioning  Progression of symptoms since initial onset: worsening  Time of day when pain is worse: end of day   Sleeping: needs mm relaxant  ; sleeps with flat pillow on his back   Social history:  with 2 kids; son in  , daughter is 2  ; wife is a nurse but currently able to work from home  Occupation: Finance Job duties: computer work    Current HEP/exercise regimen: YouTube exercises for SCM stretches, stays active with kids   Patient's goals are decrease pain    Other " pertinent PMH: none  General health as reported by patient: Excellent    Return to MD: prn      Cervical Spine Evaluation    Posture  Forward Head:  + Rounded Shoulders: +      Range of Motion  Flexion: WNL  Extension: WNL  Sidebend Left: 24  Right: 36  Rotation Left: 30  Right: 40  Protraction: WNL  Retraction: WNL-good posture  Upper extremity screen: WNL        Upper Extremity Strength  Shoulder MMT Flexion Scaption ER IR   Left 5/5 5/5 5/5 5/5   Right 5/5 5/5 5/5 5/5     Special Tests  Spurling's: Neg  Reflexes: NT  Dermatomes: WNL  Myotomes: WNL    Palpation  Sensitive at occiput; tight upper trap R>L       Assessment/Plan:  Patient is a 35 year old male with cervical and head complaints.    Patient has the following significant findings with corresponding treatment plan.                Diagnosis 1:  Cervicalgia  Pain -  hot/cold therapy, manual therapy, self management, education and home program  Decreased ROM/flexibility - manual therapy, therapeutic exercise, therapeutic activity and home program  Impaired muscle performance - neuro re-education and home program  Decreased function - therapeutic activities and home program  Impaired posture - neuro re-education, therapeutic activities and home program    Therapy Evaluation Codes:   1) History comprised of:   Personal factors that impact the plan of care:      Living environment, Past/current experiences, Profession, Time since onset of symptoms and Work status.    Comorbidity factors that impact the plan of care are:      None.     Medications impacting care: Muscle relaxant.  2) Examination of Body Systems comprised of:   Body structures and functions that impact the plan of care:      Cervical spine and Head.   Activity limitations that impact the plan of care are:      Bathing, Dressing, Sports, Working and Sleeping.  3) Clinical presentation characteristics are:   Stable/Uncomplicated.  4) Decision-Making    Low complexity using standardized patient  assessment instrument and/or measureable assessment of functional outcome.  Cumulative Therapy Evaluation is: Low complexity.    Previous and current functional limitations:  (See Goal Flow Sheet for this information)    Short term and Long term goals: (See Goal Flow Sheet for this information)     Communication ability:  Patient appears to be able to clearly communicate and understand verbal and written communication and follow directions correctly.  Treatment Explanation - The following has been discussed with the patient:   RX ordered/plan of care  Anticipated outcomes  Possible risks and side effects  This patient would benefit from PT intervention to resume normal activities.   Rehab potential is good.    Frequency:  1 X week, once daily  Duration:  for 4 weeks  Discharge Plan:  Achieve all LTG.  Independent in home treatment program.  Reach maximal therapeutic benefit.    Please refer to the daily flowsheet for treatment today, total treatment time and time spent performing 1:1 timed codes.

## 2020-10-06 PROBLEM — M54.2 NECK PAIN: Status: ACTIVE | Noted: 2020-10-06

## 2020-10-12 ENCOUNTER — IMMUNIZATION (OUTPATIENT)
Dept: NURSING | Facility: CLINIC | Age: 35
End: 2020-10-12
Payer: COMMERCIAL

## 2020-10-12 ENCOUNTER — OFFICE VISIT (OUTPATIENT)
Dept: ORTHOPEDICS | Facility: CLINIC | Age: 35
End: 2020-10-12
Payer: COMMERCIAL

## 2020-10-12 VITALS
HEIGHT: 68 IN | BODY MASS INDEX: 25.46 KG/M2 | DIASTOLIC BLOOD PRESSURE: 78 MMHG | SYSTOLIC BLOOD PRESSURE: 120 MMHG | WEIGHT: 168 LBS

## 2020-10-12 DIAGNOSIS — M54.2 NECK PAIN: ICD-10-CM

## 2020-10-12 DIAGNOSIS — R51.9 NONINTRACTABLE HEADACHE, UNSPECIFIED CHRONICITY PATTERN, UNSPECIFIED HEADACHE TYPE: Primary | ICD-10-CM

## 2020-10-12 DIAGNOSIS — Z23 NEED FOR PROPHYLACTIC VACCINATION AND INOCULATION AGAINST INFLUENZA: Primary | ICD-10-CM

## 2020-10-12 PROCEDURE — 99207 PR NO CHARGE NURSE ONLY: CPT

## 2020-10-12 PROCEDURE — 90686 IIV4 VACC NO PRSV 0.5 ML IM: CPT

## 2020-10-12 PROCEDURE — 99204 OFFICE O/P NEW MOD 45 MIN: CPT | Performed by: PEDIATRICS

## 2020-10-12 PROCEDURE — 90471 IMMUNIZATION ADMIN: CPT

## 2020-10-12 ASSESSMENT — MIFFLIN-ST. JEOR: SCORE: 1671.54

## 2020-10-12 NOTE — PATIENT INSTRUCTIONS
Advanced imaging is done by appointment. Some insurance require a prior authorization to be completed which may delay the time until you are able to schedule your appointment.Please call [place], [place] and [place]: [phone] to schedule your MRIs.  Depending on your availability you can usually schedule within the next 1-2 days.    The clinic will call you with results, if you have not heard from the clinic within 3-4 days following your MRI please contact us at the number listed below.     If you have any further questions for your physician or physician s care team you can call [phone] and use option 3 to leave a voice message. Calls received during business hours will be returned same day.

## 2020-10-12 NOTE — PROGRESS NOTES
"Sports Medicine Clinic Visit      PCP: Magalis Levy    Balwinder Perez is a 35 year old male who is seen  in consultation at the request of  Hugo Laughlin M.D. presenting with neck pain.  Noticed a lump in his neck on the right side about 4-6 weeks ago.  Primary care ordered a CT and he was diagnosed with muscle spasm.  Since that time he has had an increase pressure in his head.    Has tried muscle relaxant which was not help.  He was sent to PT and massage therapy with no relief.   Pain is currently at the base of his skull on both sides.  Has tingling on the top of his head.  Difficulty focusing.    Does have some relief with ice    Injury: insidious onset   **  Initially felt like a lump on right side of neck. Question strain. Noted some asymmetry between right and left. Then had some concern. Was evaluated, had CT.  After few more days, started feeling right temporal cramping sensation, pressure/tightness; also noted some on left. Also some tingling sensation in posterior head. \"Like electric\" sensation.  Notes that with pressure in right occipital area, feels a little better.  Feels like if turning neck to right, more of a stretch sensation, somewhat also to left.  Mornings often a little better. Symptoms increase later in day.    Ibuprofen and acetaminophen with some benefit, temporary. Some relief from topical tx.    Muscle relaxant helps with sleep, but not with pain.    **  Saw chiropractor about 10 days before onset of this issue. Had x-ray, does not recall specifics, maybe something around C1-C2 level.  Just had the 1 visit.      Location of Pain: bilateral neck   Duration of Pain: 6 week(s)  Rating of Pain at worst: 9/10  Rating of Pain Currently: 4/10  Symptoms are better with: Rest  Symptoms are worse with: work, turning neck, insidious onset  Additional Features:   Positive: swelling and paresthesias   Negative: bruising, popping, grinding, catching, locking, instability, numbness, weakness, " pain in other joints and systemic symptoms  Other evaluation and/or treatments so far consists of: neck CT   Prior History of related problems: denies     Social History: Finance, works from home     Review of Systems  Musculoskeletal: as above  Remainder of review of systems is negative including constitutional, CV, pulmonary, GI, Skin and Neurologic except as noted in HPI or medical history.    Past Medical History:   Diagnosis Date     CARDIOVASCULAR SCREENING; LDL GOAL LESS THAN 160 2013     Past Surgical History:   Procedure Laterality Date     NO HISTORY OF SURGERY       Family History   Problem Relation Age of Onset     Thyroid Disease Mother      Cancer Father         lung cancer-passed away     Other Cancer Father         Lung     Asthma Brother      C.A.D. No family hx of      Diabetes No family hx of      Hypertension No family hx of      Cerebrovascular Disease No family hx of      Social History     Socioeconomic History     Marital status:      Spouse name: Not on file     Number of children: Not on file     Years of education: Not on file     Highest education level: Not on file   Occupational History     Not on file   Social Needs     Financial resource strain: Not on file     Food insecurity     Worry: Not on file     Inability: Not on file     Transportation needs     Medical: Not on file     Non-medical: Not on file   Tobacco Use     Smoking status: Former Smoker     Packs/day: 0.00     Years: 0.00     Pack years: 0.00     Quit date: 2012     Years since quittin.8     Smokeless tobacco: Never Used   Substance and Sexual Activity     Alcohol use: No     Drug use: No     Sexual activity: Yes     Partners: Female   Lifestyle     Physical activity     Days per week: Not on file     Minutes per session: Not on file     Stress: Not on file   Relationships     Social connections     Talks on phone: Not on file     Gets together: Not on file     Attends Jew service: Not on file  "    Active member of club or organization: Not on file     Attends meetings of clubs or organizations: Not on file     Relationship status: Not on file     Intimate partner violence     Fear of current or ex partner: Not on file     Emotionally abused: Not on file     Physically abused: Not on file     Forced sexual activity: Not on file   Other Topics Concern     Parent/sibling w/ CABG, MI or angioplasty before 65F 55M? No   Social History Narrative     Not on file       Objective  /78   Ht 1.727 m (5' 8\")   Wt 76.2 kg (168 lb)   BMI 25.54 kg/m      GENERAL APPEARANCE: healthy, alert and no distress   GAIT: NORMAL  SKIN: no suspicious lesions or rashes  NEURO: Normal strength and tone, mentation intact and speech normal  PSYCH:  mentation appears normal and affect normal/bright  HEENT: no scleral icterus  CV: distal perfusion intact  RESP: nonlabored breathing      Cervical Spine Exam    Range of Motion:         forward flexion no change         extension no change        lateral rotation to right is limited with right side tightness and pain; to left with ipsilateral tightness and pain        lateral flexion with contralateral tight sensation    Inspection:         Slight prominence right SCM muscle compared to left, otherwise no deformity noted        normal lordotic curvature maintained    Tender:        paraspinal muscles R > L       upper border of trapezius mild right       SCM Right    Base occiput bilat    Strength:       C4 (shoulder shrug)  symmetric 5/5       C5 (shoulder abduction) symmetric 5/5       C6 (elbow flexion) symmetric 5/5       C7 (elbow extension) symmetric 5/5       C8 (finger abduction, thumb flexion) symmetric 5/5    Reflexes:        C5 (biceps) symmetric normal       C6 (supinator) symmetric normal       C7 (triceps) symmetric normal    Sensation:       grossly intact througout bilateral upper extremities    Special Tests:        Spurling with contralateral anterolateral neck " tightness and some pain; negative for radicular symptoms    Skin:       well perfused       capillary refill less than 2 seconds    Lymphatics:        no edema noted in the upper extremities           Radiology  Visualized the CT study noted below, and reviewed the images and the report with the patient.  See report.    Results for orders placed or performed in visit on 09/09/20   CT Soft Tissue Neck w Contrast    Narrative    CT SCAN OF THE NECK WITH CONTRAST  9/9/2020 8:31 AM     HISTORY: Acquired deformity of neck. Localized swelling, mass and  lump, neck.    TECHNIQUE:  Axial images and coronal reformations. Radiation dose for  this scan was reduced using automated exposure control, adjustment of  the mA and/or kV according to patient size, or iterative  reconstruction technique. 80 mL Isovue-370 IV.      COMPARISON: None.    FINDINGS: The area in question was marked with a radiopaque marker.  The marker overlies the right sternocleidomastoid muscle. The right  sternocleidomastoid muscle is slightly more prominent than the left  sternocleidomastoid. No discrete mass is seen within the muscle. No  enlarged cervical lymph nodes are identified in the region of the  marker. No soft tissue mass is identified.    Visualized sinuses, nasopharynx and orbits: Normal.      Tongue, oral cavity and oropharynx:  Normal.      Hypopharynx: Normal.      Larynx and trachea: Normal.      Thyroid: Normal.    Submandibular glands: Normal.      Parotid glands: Normal.        Lymph nodes: Normal.      Vasculature: Normal.      Upper mediastinum and lungs: Normal.      Bones: Normal.      Impression    IMPRESSION:   The palpable abnormality in the right neck appears to  correspond to a slightly prominent right sternocleidomastoid muscle.  The cause of this is indeterminate. No discrete mass is seen within  the muscle. No enlarged cervical lymph nodes are identified.      JAYDEN DIAMOND MD       Assessment:  1. Nonintractable headache,  unspecified chronicity pattern, unspecified headache type    2. Neck pain        Plan:  Discussed the assessment with the patient.  He also reports headaches, onset after right side neck pain started. Appears anxious about this, though we discussed consistent with musculoskeletal cause, muscular tension can cause many of his symptoms. Possible also some component occipital neuralgia given pain from base of skull, radiating symptoms.  We discussed the following: symptom treatment, activity modification/rest, imaging, rehab, injection therapy, chiropractic care, acupuncture, massage therapy and medication. Following discussion, plan:    Topical Treatments: Ice, Heat or Topical Analgesics prn  Over the counter medication: prn  Prescription Medication: ok for use of muscle relaxant if helpful, but has not been so far  CT of the neck reviewed. Reassuring findings. He is concerned there may be other pathology, such as c-spine source for pain, or possibly brain abnormality given his new onset headaches. Tried to reassure, and that this can improve with therapy. However, with his concern, we discussed additional imaging. He would like to pursue MRI cervical spine and brain; ordered each.  I think still opportunity for improvement with PT; one visit not likely to be enough for good trial.  Acupuncture, chiropractic care, massage ok if beneficial.  Briefly discussed consideration of injection therapy, such as possible occipital nerve block. Given imaging upcoming, start with that. Otherwise, would likely try to maximize PT next.  Follow up: contact pt with MRI results.  Questions answered. Discussed signs and symptoms that may indicate more serious issues; the patient was instructed to seek appropriate care if noted. Ziad indicates understanding of these issues and agrees with the plan.      Chandler Leon, DO, CAQ      CC: Hugo Laughlin              Patient Instructions    Advanced imaging is done by appointment. Some  insurance require a prior authorization to be completed which may delay the time until you are able to schedule your appointment.Please call [place], [place] and [place]: [phone] to schedule your MRIs.  Depending on your availability you can usually schedule within the next 1-2 days.    The clinic will call you with results, if you have not heard from the clinic within 3-4 days following your MRI please contact us at the number listed below.     If you have any further questions for your physician or physician s care team you can call [phone] and use option 3 to leave a voice message. Calls received during business hours will be returned same day.              This note consists of symbols derived from keyboarding, dictation and/or voice recognition software. As a result, there may be errors in the script that have gone undetected. Please consider this when interpreting information found in this chart.

## 2020-10-12 NOTE — LETTER
"    10/12/2020         RE: Balwinder Perez  [address] [facility] [address]        Dear Colleague,    Thank you for referring your patient, Balwinder Perez, to the [facility]. Please see a copy of my visit note below.    Sports Medicine Clinic Visit      PCP: Magalis Levy    Balwinder Perez is a 35 year old male who is seen  in consultation at the request of  Hugo Laughlin M.D. presenting with neck pain.  Noticed a lump in his neck on the right side about 4-6 weeks ago.  Primary care ordered a CT and he was diagnosed with muscle spasm.  Since that time he has had an increase pressure in his head.    Has tried muscle relaxant which was not help.  He was sent to PT and massage therapy with no relief.   Pain is currently at the base of his skull on both sides.  Has tingling on the top of his head.  Difficulty focusing.    Does have some relief with ice    Injury: insidious onset   **  Initially felt like a lump on right side of neck. Question strain. Noted some asymmetry between right and left. Then had some concern. Was evaluated, had CT.  After few more days, started feeling right temporal cramping sensation, pressure/tightness; also noted some on left. Also some tingling sensation in posterior head. \"Like electric\" sensation.  Notes that with pressure in right occipital area, feels a little better.  Feels like if turning neck to right, more of a stretch sensation, somewhat also to left.  Mornings often a little better. Symptoms increase later in day.    Ibuprofen and acetaminophen with some benefit, temporary. Some relief from topical tx.    Muscle relaxant helps with sleep, but not with pain.    **  Saw chiropractor about 10 days before onset of this issue. Had x-ray, does not recall specifics, maybe something around C1-C2 level.  Just had the 1 visit.      Location of Pain: bilateral neck   Duration of Pain: 6 week(s)  Rating of Pain at worst: 9/10  Rating of Pain Currently: " 4/10  Symptoms are better with: Rest  Symptoms are worse with: work, turning neck, insidious onset  Additional Features:   Positive: swelling and paresthesias   Negative: bruising, popping, grinding, catching, locking, instability, numbness, weakness, pain in other joints and systemic symptoms  Other evaluation and/or treatments so far consists of: neck CT   Prior History of related problems: denies     Social History: Finance, works from home     Review of Systems  Musculoskeletal: as above  Remainder of review of systems is negative including constitutional, CV, pulmonary, GI, Skin and Neurologic except as noted in HPI or medical history.    Past Medical History:   Diagnosis Date     CARDIOVASCULAR SCREENING; LDL GOAL LESS THAN 160 2013     Past Surgical History:   Procedure Laterality Date     NO HISTORY OF SURGERY       Family History   Problem Relation Age of Onset     Thyroid Disease Mother      Cancer Father         lung cancer-passed away     Other Cancer Father         Lung     Asthma Brother      C.A.D. No family hx of      Diabetes No family hx of      Hypertension No family hx of      Cerebrovascular Disease No family hx of      Social History     Socioeconomic History     Marital status:      Spouse name: Not on file     Number of children: Not on file     Years of education: Not on file     Highest education level: Not on file   Occupational History     Not on file   Social Needs     Financial resource strain: Not on file     Food insecurity     Worry: Not on file     Inability: Not on file     Transportation needs     Medical: Not on file     Non-medical: Not on file   Tobacco Use     Smoking status: Former Smoker     Packs/day: 0.00     Years: 0.00     Pack years: 0.00     Quit date: 2012     Years since quittin.8     Smokeless tobacco: Never Used   Substance and Sexual Activity     Alcohol use: No     Drug use: No     Sexual activity: Yes     Partners: Female   Lifestyle      "Physical activity     Days per week: Not on file     Minutes per session: Not on file     Stress: Not on file   Relationships     Social connections     Talks on phone: Not on file     Gets together: Not on file     Attends Confucianism service: Not on file     Active member of club or organization: Not on file     Attends meetings of clubs or organizations: Not on file     Relationship status: Not on file     Intimate partner violence     Fear of current or ex partner: Not on file     Emotionally abused: Not on file     Physically abused: Not on file     Forced sexual activity: Not on file   Other Topics Concern     Parent/sibling w/ CABG, MI or angioplasty before 65F 55M? No   Social History Narrative     Not on file       Objective  /78   Ht 1.727 m (5' 8\")   Wt 76.2 kg (168 lb)   BMI 25.54 kg/m      GENERAL APPEARANCE: healthy, alert and no distress   GAIT: NORMAL  SKIN: no suspicious lesions or rashes  NEURO: Normal strength and tone, mentation intact and speech normal  PSYCH:  mentation appears normal and affect normal/bright  HEENT: no scleral icterus  CV: distal perfusion intact  RESP: nonlabored breathing      Cervical Spine Exam    Range of Motion:         forward flexion no change         extension no change        lateral rotation to right is limited with right side tightness and pain; to left with ipsilateral tightness and pain        lateral flexion with contralateral tight sensation    Inspection:         Slight prominence right SCM muscle compared to left, otherwise no deformity noted        normal lordotic curvature maintained    Tender:        paraspinal muscles R > L       upper border of trapezius mild right       SCM Right    Base occiput bilat    Strength:       C4 (shoulder shrug)  symmetric 5/5       C5 (shoulder abduction) symmetric 5/5       C6 (elbow flexion) symmetric 5/5       C7 (elbow extension) symmetric 5/5       C8 (finger abduction, thumb flexion) symmetric 5/5    Reflexes: "        C5 (biceps) symmetric normal       C6 (supinator) symmetric normal       C7 (triceps) symmetric normal    Sensation:       grossly intact througout bilateral upper extremities    Special Tests:        Spurling with contralateral anterolateral neck tightness and some pain; negative for radicular symptoms    Skin:       well perfused       capillary refill less than 2 seconds    Lymphatics:        no edema noted in the upper extremities           Radiology  Visualized the CT study noted below, and reviewed the images and the report with the patient.  See report.    Results for orders placed or performed in visit on 09/09/20   CT Soft Tissue Neck w Contrast    Narrative    CT SCAN OF THE NECK WITH CONTRAST  9/9/2020 8:31 AM     HISTORY: Acquired deformity of neck. Localized swelling, mass and  lump, neck.    TECHNIQUE:  Axial images and coronal reformations. Radiation dose for  this scan was reduced using automated exposure control, adjustment of  the mA and/or kV according to patient size, or iterative  reconstruction technique. 80 mL Isovue-370 IV.      COMPARISON: None.    FINDINGS: The area in question was marked with a radiopaque marker.  The marker overlies the right sternocleidomastoid muscle. The right  sternocleidomastoid muscle is slightly more prominent than the left  sternocleidomastoid. No discrete mass is seen within the muscle. No  enlarged cervical lymph nodes are identified in the region of the  marker. No soft tissue mass is identified.    Visualized sinuses, nasopharynx and orbits: Normal.      Tongue, oral cavity and oropharynx:  Normal.      Hypopharynx: Normal.      Larynx and trachea: Normal.      Thyroid: Normal.    Submandibular glands: Normal.      Parotid glands: Normal.        Lymph nodes: Normal.      Vasculature: Normal.      Upper mediastinum and lungs: Normal.      Bones: Normal.      Impression    IMPRESSION:   The palpable abnormality in the right neck appears to  correspond to  a slightly prominent right sternocleidomastoid muscle.  The cause of this is indeterminate. No discrete mass is seen within  the muscle. No enlarged cervical lymph nodes are identified.      JAYDEN DIAMOND MD       Assessment:  1. Nonintractable headache, unspecified chronicity pattern, unspecified headache type    2. Neck pain        Plan:  Discussed the assessment with the patient.  He also reports headaches, onset after right side neck pain started. Appears anxious about this, though we discussed consistent with musculoskeletal cause, muscular tension can cause many of his symptoms. Possible also some component occipital neuralgia given pain from base of skull, radiating symptoms.  We discussed the following: symptom treatment, activity modification/rest, imaging, rehab, injection therapy, chiropractic care, acupuncture, massage therapy and medication. Following discussion, plan:    Topical Treatments: Ice, Heat or Topical Analgesics prn  Over the counter medication: prn  Prescription Medication: ok for use of muscle relaxant if helpful, but has not been so far  CT of the neck reviewed. Reassuring findings. He is concerned there may be other pathology, such as c-spine source for pain, or possibly brain abnormality given his new onset headaches. Tried to reassure, and that this can improve with therapy. However, with his concern, we discussed additional imaging. He would like to pursue MRI cervical spine and brain; ordered each.  I think still opportunity for improvement with PT; one visit not likely to be enough for good trial.  Acupuncture, chiropractic care, massage ok if beneficial.  Briefly discussed consideration of injection therapy, such as possible occipital nerve block. Given imaging upcoming, start with that. Otherwise, would likely try to maximize PT next.  Follow up: contact pt with MRI results.  Questions answered. Discussed signs and symptoms that may indicate more serious issues; the patient was  instructed to seek appropriate care if noted. Ziad indicates understanding of these issues and agrees with the plan.      Chandler Leon DO, CURT      CC: Hugo Laughlin              Patient Instructions    Advanced imaging is done by appointment. Some insurance require a prior authorization to be completed which may delay the time until you are able to schedule your appointment.Please call [place] [place] [facility]: [phone] to schedule your MRIs.  Depending on your availability you can usually schedule within the next 1-2 days.    The clinic will call you with results, if you have not heard from the clinic within 3-4 days following your MRI please contact us at the number listed below.     If you have any further questions for your physician or physician s care team you can call [phone] and use option 3 to leave a voice message. Calls received during business hours will be returned same day.              This note consists of symbols derived from keyboarding, dictation and/or voice recognition software. As a result, there may be errors in the script that have gone undetected. Please consider this when interpreting information found in this chart.        Again, thank you for allowing me to participate in the care of your patient.        Sincerely,        Chandler Leon DO

## 2020-10-13 ENCOUNTER — THERAPY VISIT (OUTPATIENT)
Dept: PHYSICAL THERAPY | Facility: CLINIC | Age: 35
End: 2020-10-13
Payer: COMMERCIAL

## 2020-10-13 DIAGNOSIS — M54.2 NECK PAIN: ICD-10-CM

## 2020-10-13 PROCEDURE — 97140 MANUAL THERAPY 1/> REGIONS: CPT | Mod: GP | Performed by: PHYSICAL THERAPIST

## 2020-10-13 PROCEDURE — 97110 THERAPEUTIC EXERCISES: CPT | Mod: GP | Performed by: PHYSICAL THERAPIST

## 2020-10-13 PROCEDURE — 97530 THERAPEUTIC ACTIVITIES: CPT | Mod: GP | Performed by: PHYSICAL THERAPIST

## 2020-10-19 ENCOUNTER — TELEPHONE (OUTPATIENT)
Dept: ORTHOPEDICS | Facility: CLINIC | Age: 35
End: 2020-10-19

## 2020-10-19 NOTE — TELEPHONE ENCOUNTER
Called and spoke with patient.  He would like to continue with PT.  Will cancel his MRI for now and continue with PT.  He will follow up if needed after completing of PT, as he feels he is already seeing improvements.     Mandy Nicholas MS ATC

## 2020-10-19 NOTE — TELEPHONE ENCOUNTER
----- Message -----   From: Chandler Leon DO   Sent: 10/19/2020   8:00 AM CDT   [facility]   Subject: RE: OFFICE NOTE 10/12/2020                       If the MRI scan cervical spine has been denied, the patient should be advised of this and that he should complete other conservative management first. I do not think a [id number]-tv-[id number] is likely to be of benefit, in light of the notes below.   His first PT visit was 10/5/20, so 6 weeks would be 20. He has done 2 visits, and is now (as of 10/29/20) already 2 weeks from first visit. Would advise he complete the PT course, or do over the course of 6 weeks in total, to start.   Also note that he previously had CT neck ordered by primary care.   [facility] please see the above, and below. I would advise he hold with the cervical MRI at this time, and if continued symptoms despite PT, or if any concerning changes in symptoms, then he can contact the clinic with an update and scan can be reconsidered, but would likely need prior authorization.   For the brain MRI, there is no indication that it was not approved, so it appears he can proceed with that.   Thanks.   Chandler Leon DO, CAQ     ----- Message -----   From: Erich Pineda   Sent: 10/15/2020   9:01 AM CDT   To: Chandler Leon DO   Subject: FW: OFFICE NOTE 10/12/2020                       Peer to Peer Request     Patient Name: Balwinder Perez   : 1985   MRN: [id number]     Dr. Leon,     The authorization for procedure MR CERVICAL SPINE W on date of service 10/20/2020 has been denied. We were unsuccessful in obtaining approval through clinical review. A xwbr-mf-[id number] review can be done by calling the insurance/third party authorization vendor with the following information:     Insurance: [employer]   Auth Vendor: [employer]   Phone: [phone] opt # 3 OR you can do a written request and I can fax in- please note written request takes  longer to review.   Due: ASAP     Patient ID: [id number]   Case/Ref #: [id number]     Denial Reason: The clinical reason for our determination is: Based on [facility] Guidelines Section: SP 3.1 Neck (Cervical Spine) Pain without and with Neurological   Features (Including Stenosis), we cannot approve this request. Your records show that   you have neck pain. The reason this request cannot be approved is because: with one of the following. -Failure to improve after a recent (within three months) six   week trial of physician-guided clinical care with clinical re-evaluation. -Any signs or   symptoms such as significant motor weakness, malignancy, infection, cauda equina   syndrome, for which conservative treatment is not needed. The clinical information   received fails to support meeting these requirements and, therefore, the request is not   indicated at this time. The clinical information reviewed shows that the same test or one similar to the requested study was previously performed. The results of this prior imaging were provided, but they do not show why these results are not sufficient for the evaluation of the current clinical condition. Additional imaging is not supported without a clear reason why it is needed and, therefore, the request is not indicated at this time.       Please complete this as soon as you are able and let me know when it is done.     Thank you,     Eva Pineda   [facility]

## 2020-10-27 ENCOUNTER — THERAPY VISIT (OUTPATIENT)
Dept: PHYSICAL THERAPY | Facility: CLINIC | Age: 35
End: 2020-10-27
Payer: COMMERCIAL

## 2020-10-27 DIAGNOSIS — M54.2 NECK PAIN: ICD-10-CM

## 2020-10-27 PROCEDURE — 97110 THERAPEUTIC EXERCISES: CPT | Mod: GP | Performed by: PHYSICAL THERAPIST

## 2020-10-27 PROCEDURE — 97140 MANUAL THERAPY 1/> REGIONS: CPT | Mod: GP | Performed by: PHYSICAL THERAPIST

## 2020-10-29 ENCOUNTER — MYC MEDICAL ADVICE (OUTPATIENT)
Dept: ORTHOPEDICS | Facility: CLINIC | Age: 35
End: 2020-10-29

## 2020-10-29 NOTE — TELEPHONE ENCOUNTER
Lisadled with Dr. Leon regarding patient message.    Patient's MRI has been previously denied as he need to do 6 weeks of PT with clinical re evaluation.    Options per Dr. Leon are to place referral for pain management for possible procedure (ie trigger point vs occipital nerve block.  And/or continue with physical therapy for 6 weeks and have a follow up with Dr. Leon.    Marielena Underwood ATC

## 2020-11-02 ENCOUNTER — VIRTUAL VISIT (OUTPATIENT)
Dept: PSYCHOLOGY | Facility: CLINIC | Age: 35
End: 2020-11-02
Attending: PHYSICIAN ASSISTANT

## 2020-11-02 ENCOUNTER — TELEPHONE (OUTPATIENT)
Dept: PSYCHOLOGY | Facility: CLINIC | Age: 35
End: 2020-11-02

## 2020-11-02 DIAGNOSIS — R69 DIAGNOSIS DEFERRED: Primary | ICD-10-CM

## 2020-11-03 ENCOUNTER — VIRTUAL VISIT (OUTPATIENT)
Dept: FAMILY MEDICINE | Facility: CLINIC | Age: 35
End: 2020-11-03
Payer: COMMERCIAL

## 2020-11-03 ENCOUNTER — THERAPY VISIT (OUTPATIENT)
Dept: PHYSICAL THERAPY | Facility: CLINIC | Age: 35
End: 2020-11-03
Payer: COMMERCIAL

## 2020-11-03 DIAGNOSIS — G51.4 FACIAL TWITCHING: ICD-10-CM

## 2020-11-03 DIAGNOSIS — M54.2 NECK PAIN: Primary | ICD-10-CM

## 2020-11-03 DIAGNOSIS — M54.2 NECK PAIN: ICD-10-CM

## 2020-11-03 LAB
ALBUMIN SERPL-MCNC: 4 G/DL (ref 3.4–5)
ALP SERPL-CCNC: 76 U/L (ref 40–150)
ALT SERPL W P-5'-P-CCNC: 86 U/L (ref 0–70)
ANION GAP SERPL CALCULATED.3IONS-SCNC: 3 MMOL/L (ref 3–14)
AST SERPL W P-5'-P-CCNC: 32 U/L (ref 0–45)
BILIRUB SERPL-MCNC: 0.5 MG/DL (ref 0.2–1.3)
BUN SERPL-MCNC: 11 MG/DL (ref 7–30)
CALCIUM SERPL-MCNC: 9.4 MG/DL (ref 8.5–10.1)
CHLORIDE SERPL-SCNC: 106 MMOL/L (ref 94–109)
CO2 SERPL-SCNC: 31 MMOL/L (ref 20–32)
CREAT SERPL-MCNC: 0.87 MG/DL (ref 0.66–1.25)
GFR SERPL CREATININE-BSD FRML MDRD: >90 ML/MIN/{1.73_M2}
GLUCOSE SERPL-MCNC: 86 MG/DL (ref 70–99)
POTASSIUM SERPL-SCNC: 4 MMOL/L (ref 3.4–5.3)
PROT SERPL-MCNC: 7.7 G/DL (ref 6.8–8.8)
SODIUM SERPL-SCNC: 140 MMOL/L (ref 133–144)

## 2020-11-03 PROCEDURE — 82306 VITAMIN D 25 HYDROXY: CPT | Performed by: FAMILY MEDICINE

## 2020-11-03 PROCEDURE — 97140 MANUAL THERAPY 1/> REGIONS: CPT | Mod: GP | Performed by: PHYSICAL THERAPIST

## 2020-11-03 PROCEDURE — 99214 OFFICE O/P EST MOD 30 MIN: CPT | Mod: 95 | Performed by: FAMILY MEDICINE

## 2020-11-03 PROCEDURE — 80053 COMPREHEN METABOLIC PANEL: CPT | Performed by: FAMILY MEDICINE

## 2020-11-03 PROCEDURE — 97110 THERAPEUTIC EXERCISES: CPT | Mod: GP | Performed by: PHYSICAL THERAPIST
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
## 2020-11-03 NOTE — LETTER
[facility]  [address]  [address]  [place] [address]  [phone]    2020    Re: Balwinder Perez   :   1985  MRN:  [id number]   REFERRING PHYSICIAN:   Cortney Salazar, APRN CNP    [facility]  Date of Initial Evaluation:  10/05/2020  Visits:  Rxs Used: 4  Reason for Referral:  Neck pain    EVALUATION SUMMARY    PROGRESS  REPORT  Progress reporting period is from 10/05/2020 to 11/3/2020.       SUBJECTIVE   Subjective: Pt reports that he has had increased pain in head. He has noticed eye and lip twitching. He is aware that when he is not working he has no pain. Pt had pain return almost immediately upon going back to work. Patient wondering if stress can impact symptoms.     Current pain level is 8/10  .     Initial Pain level: 8/10.   Changes in function:  Initially making good progress towards goals; however, has recently had an exacerbation of symptoms since return to work  Adverse reaction to treatment or activity: None    OBJECTIVE    Objective: Continued HEP and progressed as able. Reviewed time frame for strengthening, importance of ergonomics for home office, and how symptoms do appear to be linked to work schedule.      ASSESSMENT/PLAN  Updated problem list and treatment plan: Diagnosis 1:  Cervicalgia   Pain -  hot/cold therapy, manual therapy and home program  Impaired muscle performance - neuro re-education and home program  Decreased function - therapeutic activities and home program  Impaired posture - neuro re-education, therapeutic activities and home program  STG/LTGs have been met or progress has been made towards goals:  Yes (See Goal flow sheet completed today.)  Assessment of Progress: The patient's condition has potential to improve.  Self Management Plans:  Patient has been instructed in a home treatment program.  I have re-evaluated this patient and find that the nature, scope, duration and intensity of the therapy is appropriate for the medical  condition of the patient.  Balwinder continues to require the following intervention to meet STG and LTG's:  PT    Re: Balwinder Perez   :   1985    Recommendations:  This patient would benefit from continued therapy.     Frequency:  2 X a month, once daily  Duration:  for 1 months      Please refer to the daily flowsheet for treatment today, total treatment time and time spent performing 1:1 timed codes.          Thank you for your referral.    INQUIRIES  Therapist: Kinza Nj, PT, DPT  [facility]  [address] [address]  [place] [address]  Phone: [phone]  Fax: [phone]

## 2020-11-04 LAB — DEPRECATED CALCIDIOL+CALCIFEROL SERPL-MC: 25 UG/L (ref 20–75)

## 2020-11-04 NOTE — TELEPHONE ENCOUNTER
Appears patient had pain management referral placed by primary care yesterday    Mandy Nicholas MS ATC

## 2020-11-04 NOTE — PROGRESS NOTES
Subjective:  HPI  Physical Exam                    Objective:  System    Physical Exam    General     ROS    Assessment/Plan:    PROGRESS  REPORT    Progress reporting period is from 10/05/2020 to 11/3/2020.       SUBJECTIVE   Subjective: Pt reports that he has had increased pain in head. He has noticed eye and lip twitching. He is aware that when he is not working he has no pain. Pt had pain return almost immediately upon going back to work. Patient wondering if stress can impact symptoms.     Current pain level is 8/10  .     Initial Pain level: 8/10.   Changes in function:  Initially making good progress towards goals; however, has recently had an exacerbation of symptoms since return to work  Adverse reaction to treatment or activity: None    OBJECTIVE    Objective: Continued HEP and progressed as able. Reviewed time frame for strengthening, importance of ergonomics for home office, and how symptoms do appear to be linked to work schedule.      ASSESSMENT/PLAN  Updated problem list and treatment plan: Diagnosis 1:  Cervicalgia   Pain -  hot/cold therapy, manual therapy and home program  Impaired muscle performance - neuro re-education and home program  Decreased function - therapeutic activities and home program  Impaired posture - neuro re-education, therapeutic activities and home program  STG/LTGs have been met or progress has been made towards goals:  Yes (See Goal flow sheet completed today.)  Assessment of Progress: The patient's condition has potential to improve.  Self Management Plans:  Patient has been instructed in a home treatment program.  I have re-evaluated this patient and find that the nature, scope, duration and intensity of the therapy is appropriate for the medical condition of the patient.  Ziad continues to require the following intervention to meet STG and LTG's:  PT    Recommendations:  This patient would benefit from continued therapy.     Frequency:  2 X a month, once daily  Duration:   for 1 months        Please refer to the daily flowsheet for treatment today, total treatment time and time spent performing 1:1 timed codes.

## 2020-11-06 ENCOUNTER — HOSPITAL ENCOUNTER (EMERGENCY)
Facility: CLINIC | Age: 35
Discharge: HOME | End: 2020-11-06
Attending: EMERGENCY MEDICINE | Admitting: EMERGENCY MEDICINE
Payer: COMMERCIAL

## 2020-11-06 VITALS
WEIGHT: 171.6 LBS | TEMPERATURE: 97.8 F | SYSTOLIC BLOOD PRESSURE: 121 MMHG | RESPIRATION RATE: 16 BRPM | DIASTOLIC BLOOD PRESSURE: 74 MMHG | HEART RATE: 83 BPM | BODY MASS INDEX: 26.93 KG/M2 | HEIGHT: 67 IN | OXYGEN SATURATION: 99 %

## 2020-11-06 DIAGNOSIS — M54.81 CERVICO-OCCIPITAL NEURALGIA: ICD-10-CM

## 2020-11-06 DIAGNOSIS — M54.2 NECK PAIN: ICD-10-CM

## 2020-11-06 PROCEDURE — 99283 EMERGENCY DEPT VISIT LOW MDM: CPT | Performed by: EMERGENCY MEDICINE

## 2020-11-06 PROCEDURE — 99284 EMERGENCY DEPT VISIT MOD MDM: CPT | Performed by: EMERGENCY MEDICINE

## 2020-11-06 PROCEDURE — 250N000013 HC RX MED GY IP 250 OP 250 PS 637: Performed by: EMERGENCY MEDICINE

## 2020-11-06 RX ORDER — OXYCODONE HYDROCHLORIDE 5 MG/1
5 TABLET ORAL ONCE
Status: COMPLETED | OUTPATIENT
Start: 2020-11-06 | End: 2020-11-06

## 2020-11-06 RX ORDER — LIDOCAINE 4 G/G
1 PATCH TOPICAL
Status: DISCONTINUED | OUTPATIENT
Start: 2020-11-06 | End: 2020-11-07 | Stop reason: HOSPADM

## 2020-11-06 RX ORDER — OXYCODONE HYDROCHLORIDE 5 MG/1
5 TABLET ORAL EVERY 6 HOURS PRN
Qty: 12 TABLET | Refills: 0 | Status: SHIPPED | OUTPATIENT
Start: 2020-11-06 | End: 2020-11-11

## 2020-11-06 RX ADMIN — LIDOCAINE 1 PATCH: 560 PATCH PERCUTANEOUS; TOPICAL; TRANSDERMAL at 23:03

## 2020-11-06 RX ADMIN — OXYCODONE HYDROCHLORIDE 5 MG: 5 TABLET ORAL at 22:47

## 2020-11-06 ASSESSMENT — MIFFLIN-ST. JEOR: SCORE: 1672

## 2020-11-06 NOTE — ED AVS SNAPSHOT
[facility] Emergency Department  [address]  [place] [address]  Phone: [phone]  Fax: [phone]                                    Balwinder Perez   MRN: [id number]    Department: [facility] Emergency Department   Date of Visit: 11/6/2020           After Visit Summary Signature Page    I have received my discharge instructions, and my questions have been answered. I have discussed any challenges I see with this plan with the nurse or doctor.    ..........................................................................................................................................  Patient/Patient Representative Signature      ..........................................................................................................................................  Patient Representative Print Name and Relationship to Patient    ..................................................               ................................................  Date                                   Time    ..........................................................................................................................................  Reviewed by Signature/Title    ...................................................              ..............................................  Date                                               Time          22EPIC Rev 08/18

## 2020-11-07 ASSESSMENT — ENCOUNTER SYMPTOMS
RHINORRHEA: 0
WEAKNESS: 0
SINUS PRESSURE: 1
COUGH: 0
WOUND: 0
SLEEP DISTURBANCE: 1
DIARRHEA: 0
LIGHT-HEADEDNESS: 0
VOMITING: 0
SPEECH DIFFICULTY: 0
SHORTNESS OF BREATH: 0
NECK STIFFNESS: 1
BACK PAIN: 0
NECK PAIN: 1
NERVOUS/ANXIOUS: 1
CHILLS: 0
ARTHRALGIAS: 0
SORE THROAT: 0
NUMBNESS: 0
FACIAL ASYMMETRY: 0
HEADACHES: 1
FEVER: 0
NAUSEA: 0
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
## 2020-11-07 NOTE — DISCHARGE INSTRUCTIONS
Please make an appointment to follow up with Your Primary Care Provider and Neurology Clinic (phone: [phone]) as soon as possible as needed.    Read about trigeminal neuralgia.  It is a similar process to occipital neuralgia but involves a different facial nerve.      Take oxycodone for severe pain. Do not drive or drink alcohol while taking this medication. This is a sedating drug and may cause you to be off balance and at risk for falling especially when taken with other sedating medications. Use only as needed, and with caution. You should also take a stool softener while you are on this medication to counteract the side effect of constipation.    You may also try acupuncture, massage, or a TENS unit as alternative treatments for pain.    Turn to the emergency department if you develop worsening pain, vision changes, slurred speech, confusion, facial asymmetry, weakness, fever.

## 2020-11-12 ENCOUNTER — THERAPY VISIT (OUTPATIENT)
Dept: PHYSICAL THERAPY | Facility: CLINIC | Age: 35
End: 2020-11-12
Payer: COMMERCIAL

## 2020-11-12 DIAGNOSIS — M54.2 NECK PAIN: ICD-10-CM

## 2020-11-12 PROCEDURE — 97530 THERAPEUTIC ACTIVITIES: CPT | Mod: GP | Performed by: PHYSICAL THERAPIST

## 2020-11-12 PROCEDURE — 97110 THERAPEUTIC EXERCISES: CPT | Mod: GP | Performed by: PHYSICAL THERAPIST

## 2020-11-17 ENCOUNTER — THERAPY VISIT (OUTPATIENT)
Dept: PHYSICAL THERAPY | Facility: CLINIC | Age: 35
End: 2020-11-17
Payer: COMMERCIAL

## 2020-11-17 DIAGNOSIS — M54.2 NECK PAIN: ICD-10-CM

## 2020-11-17 PROCEDURE — 97530 THERAPEUTIC ACTIVITIES: CPT | Mod: GP | Performed by: PHYSICAL THERAPIST

## 2020-11-17 PROCEDURE — 97110 THERAPEUTIC EXERCISES: CPT | Mod: GP | Performed by: PHYSICAL THERAPIST

## 2020-11-17 NOTE — LETTER
[facility]  [address]  [address]  [place] [address]  [phone]    2020    Re: Balwinder Perez   :   1985  MRN:  [id number]   REFERRING PHYSICIAN:   Cortney Salazar, APRN CNP    [facility]    Date of Initial Evaluation:  10/05/2020  Visits:  Rxs Used: 6  Reason for Referral:  Neck pain    EVALUATION SUMMARY    Subjective:  HPI  Physical Exam                    Objective:  System    Physical Exam    General     ROS    Assessment/Plan:    PROGRESS  REPORT    Progress reporting period is from 10/05/2020 to 2020.       SUBJECTIVE   Subjective: Pt reports that exercises feel fine but hasn't noticed a difference in symptoms. Pain is stll along occiput and around face and into ears. He has had more dizziness if in sitting. He feels needles on top of his head. Patient has no pain in the morning but comes on almost immediately upon sitting. He often goes to bed without pain.     Current pain level is: 8+/10   Initial Pain level: 8/10.   Changes in function:  Pt initially making progress but reports symptoms have worsened. Feels something is wrong  Adverse reaction to treatment or activity: None    OBJECTIVE  Cervical ROM    2020   Flexion: WNL  Extension: WNL  Sidebend Left:  35                 Right: 31  Rotation Left:  41                   Right:  40    10/05/2020  Sidebend Left: 24                    Right: 36  Rotation Left: 30                     Right: 40    Objective: Improved cervical ROM since initiation of PT on 10/5/2020. Pt continues to do exercises and during early sessions of PT, noted relief. However, feels that no improvement now with symtpoms and symptoms are wrapping around face, head, and ears.Pt has verbalized understanding of the importance of ergonomics at home and importance of strengthening/stretching program. Due to plateau in PT at this time, will plan to continue with HEP and follow-up with upcoming pain management and neurology appts.       ASSESSMENT/PLAN  Updated problem list and treatment plan: Diagnosis 1:  Neck Pain   Pain -  home program  STG/LTGs have been met or progress has been made towards goals:  Pt reports continued symptoms   Assessment of Progress: The patient's progress has plateaued.  Self Management Plans:  Patient has been instructed in a home treatment program.    Ziad continues to require the following intervention to meet STG and LTG's:  PT intervention is no longer required to meet STG/LTG.    Recommendations:  This patient would benefit from further evaluation.    Please refer to the daily flowsheet for treatment today, total treatment time and time spent performing 1:1 timed codes.        Thank you for your referral.    INQUIRIES  Therapist: Kinza Nj, PT DPT  [facility]  [address]  [address]  [place] [address]  Phone: [phone]  Fax: [phone]

## 2020-11-17 NOTE — PROGRESS NOTES
Subjective:  HPI  Physical Exam                    Objective:  System    Physical Exam    General     ROS    Assessment/Plan:    PROGRESS  REPORT    Progress reporting period is from 10/05/2020 to 11/17/2020.       SUBJECTIVE   Subjective: Pt reports that exercises feel fine but hasn't noticed a difference in symptoms. Pain is stll along occiput and around face and into ears. He has had more dizziness if in sitting. He feels needles on top of his head. Patient has no pain in the morning but comes on almost immediately upon sitting. He often goes to bed without pain.     Current pain level is: 8+/10   Initial Pain level: 8/10.   Changes in function:  Pt initially making progress but reports symptoms have worsened. Feels something is wrong  Adverse reaction to treatment or activity: None    OBJECTIVE  Cervical ROM    11/14/2020   Flexion: WNL  Extension: WNL  Sidebend Left:  35                 Right: 31  Rotation Left:  41                   Right:  40    10/05/2020  Sidebend Left: 24                    Right: 36  Rotation Left: 30                     Right: 40    Objective: Improved cervical ROM since initiation of PT on 10/5/2020. Pt continues to do exercises and during early sessions of PT, noted relief. However, feels that no improvement now with symtpoms and symptoms are wrapping around face, head, and ears.Pt has verbalized understanding of the importance of ergonomics at home and importance of strengthening/stretching program. Due to plateau in PT at this time, will plan to continue with HEP and follow-up with upcoming pain management and neurology appts.      ASSESSMENT/PLAN  Updated problem list and treatment plan: Diagnosis 1:  Neck Pain   Pain -  home program  STG/LTGs have been met or progress has been made towards goals:  Pt reports continued symptoms   Assessment of Progress: The patient's progress has plateaued.  Self Management Plans:  Patient has been instructed in a home treatment program.    Balwinder  continues to require the following intervention to meet STG and LTG's:  PT intervention is no longer required to meet STG/LTG.    Recommendations:  This patient would benefit from further evaluation.    Please refer to the daily flowsheet for treatment today, total treatment time and time spent performing 1:1 timed codes.

## 2020-11-18 NOTE — TELEPHONE ENCOUNTER
Correct, probably needs an official recheck per the initial denial.  Virtual ok.  Thanks.  Chandler Leon DO, CAQ

## 2020-11-18 NOTE — TELEPHONE ENCOUNTER
Please advise.  Per denial he would need clinical reevaluation.  Can offer a virtual visit.    Mandy Nicholas MS ATC

## 2020-11-19 ENCOUNTER — HOSPITAL ENCOUNTER (OUTPATIENT)
Dept: MRI IMAGING | Facility: CLINIC | Age: 35
Discharge: HOME | End: 2020-11-19
Attending: PEDIATRICS | Admitting: PEDIATRICS
Payer: COMMERCIAL

## 2020-11-19 ENCOUNTER — VIRTUAL VISIT (OUTPATIENT)
Dept: ORTHOPEDICS | Facility: CLINIC | Age: 35
End: 2020-11-19
Payer: COMMERCIAL

## 2020-11-19 DIAGNOSIS — R51.9 NONINTRACTABLE HEADACHE, UNSPECIFIED CHRONICITY PATTERN, UNSPECIFIED HEADACHE TYPE: ICD-10-CM

## 2020-11-19 DIAGNOSIS — M54.2 NECK PAIN: ICD-10-CM

## 2020-11-19 DIAGNOSIS — M54.2 NECK PAIN: Primary | ICD-10-CM

## 2020-11-19 PROCEDURE — 70551 MRI BRAIN STEM W/O DYE: CPT

## 2020-11-19 PROCEDURE — 99213 OFFICE O/P EST LOW 20 MIN: CPT | Mod: 95 | Performed by: PEDIATRICS

## 2020-11-19 PROCEDURE — 72141 MRI NECK SPINE W/O DYE: CPT

## 2020-11-19 NOTE — PROGRESS NOTES
"Balwinder Perez is a 35 year old male who is being evaluated via a billable video visit.      The patient has been notified of following:     \"This video visit will be conducted via a call between you and your physician/provider. We have found that certain health care needs can be provided without the need for an in-person physical exam.  This service lets us provide the care you need with a video conversation.  If a prescription is necessary we can send it directly to your pharmacy.  If lab work is needed we can place an order for that and you can then stop by our lab to have the test done at a later time.    Video visits are billed at different rates depending on your insurance coverage.  Please reach out to your insurance provider with any questions.    If during the course of the call the physician/provider feels a video visit is not appropriate, you will not be charged for this service.\"    Patient has given verbal consent for Video visit? Yes  How would you like to obtain your AVS? MyChart  If you are dropped from the video visit, the video invite should be resent to: Text to cell phone: [phone]  Will anyone else be joining your video visit? No       Sports Medicine Clinic Visit    PCP: Tahmina Schafer    Balwinder Perez is a 35 year old male who is seen  as a self referral presenting with neck pain    Injury: Patient has ongoing neck pain. He has been treated by Dr Leon - last visit 10/12. He reports neck pain, headaches, and dizziness. He is also having pressure in his face. He reports no improvement with physical therapy and OTC medications. He has pain with sitting and computer work, but pain also comes and goes with ather activities. His pain improves with resting and lying down. His pain starts at the back of his neck muscles/occiput and radiates up into his head, at times down into arms. He notes some mild weakness in arms.  - Has pain management appointment scheduled next week as well as " Neurology    Location of Pain: neck   Duration of Pain: 2 month(s)  Rating of Pain at worst: 10/10  Rating of Pain Currently: 10/10  Symptoms are better with: rest  Symptoms are worse with: sitting and computer work  Additional Features:   Positive: paresthesias, numbness and weakness   Negative: swelling, bruising, popping, grinding, catching, locking and instability  Other evaluation and/or treatments so far consists of: Ice, Heat, Tylenol, Ibuprofen, Rest and physical therapy  Prior History of related problems: neck pain    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    Patient's current problem list, past medical and surgical history, and family history were reviewed.    Patient Active Problem List   Diagnosis     CARDIOVASCULAR SCREENING; LDL GOAL LESS THAN 160     Neck pain     Past Medical History:   Diagnosis Date     CARDIOVASCULAR SCREENING; LDL GOAL LESS THAN 160 1/25/2013     Past Surgical History:   Procedure Laterality Date     NO HISTORY OF SURGERY       Family History   Problem Relation Age of Onset     Thyroid Disease Mother      Cancer Father         lung cancer-passed away     Other Cancer Father         Lung     Asthma Brother      C.A.D. No family hx of      Diabetes No family hx of      Hypertension No family hx of      Cerebrovascular Disease No family hx of        Objective  GENERAL: Healthy, alert and no distress  EYES: Eyes grossly normal to inspection.  No discharge or erythema, or obvious scleral/conjunctival abnormalities.  RESP: No audible wheeze, cough, or visible cyanosis.  No visible retractions or increased work of breathing.    SKIN: Visible skin clear. No significant rash, abnormal pigmentation or lesions.  NEURO: Cranial nerves  grossly intact.  Mentation and speech appropriate for age.  PSYCH: Mentation appears normal, affect normal/bright, judgement and insight intact, normal speech and appearance well-groomed.    Cervical Spine Exam    Inspection:       No visible deformity    Posture:      normal    Tender:      Patient points to occipital neck    Range of Motion:       Slightly limited extension and lateral rotation    Painful Motions:       extension        lateral rotation    Reviewed prior exam: 10/12/2020 normal neurologic exam with good strength    Radiology  none    Assessment:  1. Neck pain    2. Nonintractable headache, unspecified chronicity pattern, unspecified headache type      Given symptoms for 2 months and lack of improvement with 6 weeks of PT, I agree with referral for MRI Brain and Spine. More likely occipital neuralgia, less likely radicular given description of pain.  Agree with pain clinic referral for possible injection and Neurology referral.  - Could consider vestibular therapy pending results as well given dizziness.    Plan:  - Today's Plan of Care:  Obtain MRI Cervical Spine and Brain  Follow up with Pain and Neurology as Scheduled  Continue PT and Home Exercise Program    -We also discussed other future treatment options:  Consider vestibular therapy pending imaging results given dizziness    Follow Up: contact us to review MRI once done      Video-Visit Details    Type of service:  Video Visit    Video Start Time: 1:04 PM  Video End Time: 1:18 PM    Originating Location (pt. Location): Home    Distant Location (provider location):  [facility]     Platform used for Video Visit: [employer]    Concerning signs and symptoms were reviewed.  The patient expressed understanding of this management plan and all questions were answered at this time.    Lisa Max MD CAQ  Primary Care Sports Medicine  [facility]

## 2020-11-19 NOTE — PATIENT INSTRUCTIONS
Plan:  - Today's Plan of Care:  Obtain MRI Cervical Spine and Brain  Follow up with Pain and Neurology as Scheduled  Continue PT and Home Exercise Program    -We also discussed other future treatment options:  Consider vestibular therapy pending imaging results given dizziness    Follow Up: contact us to review MRI once done    If you have any further questions for your physician or physician s care team you can call [phone] and use option 3 to leave a voice message. Calls received during business hours will be returned same day.

## 2020-11-19 NOTE — LETTER
"    11/19/2020         RE: Balwinder Perez  [address] [address]        Dear Colleague,    Thank you for referring your patient, Balwinder Perez, to the [facility]. Please see a copy of my visit note below.    Balwinder Perez is a 35 year old male who is being evaluated via a billable video visit.      The patient has been notified of following:     \"This video visit will be conducted via a call between you and your physician/provider. We have found that certain health care needs can be provided without the need for an in-person physical exam.  This service lets us provide the care you need with a video conversation.  If a prescription is necessary we can send it directly to your pharmacy.  If lab work is needed we can place an order for that and you can then stop by our lab to have the test done at a later time.    Video visits are billed at different rates depending on your insurance coverage.  Please reach out to your insurance provider with any questions.    If during the course of the call the physician/provider feels a video visit is not appropriate, you will not be charged for this service.\"    Patient has given verbal consent for Video visit? Yes  How would you like to obtain your AVS? MyChart  If you are dropped from the video visit, the video invite should be resent to: Text to cell phone: [phone]  Will anyone else be joining your video visit? No       Sports Medicine Clinic Visit    PCP: Tahmina Schafer    Balwinder Perez is a 35 year old male who is seen  as a self referral presenting with neck pain    Injury: Patient has ongoing neck pain. He has been treated by Dr Leon - last visit 10/12. He reports neck pain, headaches, and dizziness. He is also having pressure in his face. He reports no improvement with physical therapy and OTC medications. He has pain with sitting and computer work, but pain also comes and goes with ather activities. His pain " improves with resting and lying down. His pain starts at the back of his neck muscles/occiput and radiates up into his head, at times down into arms. He notes some mild weakness in arms.  - Has pain management appointment scheduled next week as well as Neurology    Location of Pain: neck   Duration of Pain: 2 month(s)  Rating of Pain at worst: 10/10  Rating of Pain Currently: 10/10  Symptoms are better with: rest  Symptoms are worse with: sitting and computer work  Additional Features:   Positive: paresthesias, numbness and weakness   Negative: swelling, bruising, popping, grinding, catching, locking and instability  Other evaluation and/or treatments so far consists of: Ice, Heat, Tylenol, Ibuprofen, Rest and physical therapy  Prior History of related problems: neck pain    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    Patient's current problem list, past medical and surgical history, and family history were reviewed.    Patient Active Problem List   Diagnosis     CARDIOVASCULAR SCREENING; LDL GOAL LESS THAN 160     Neck pain     Past Medical History:   Diagnosis Date     CARDIOVASCULAR SCREENING; LDL GOAL LESS THAN 160 1/25/2013     Past Surgical History:   Procedure Laterality Date     NO HISTORY OF SURGERY       Family History   Problem Relation Age of Onset     Thyroid Disease Mother      Cancer Father         lung cancer-passed away     Other Cancer Father         Lung     Asthma Brother      C.A.D. No family hx of      Diabetes No family hx of      Hypertension No family hx of      Cerebrovascular Disease No family hx of        Objective  GENERAL: Healthy, alert and no distress  EYES: Eyes grossly normal to inspection.  No discharge or erythema, or obvious  scleral/conjunctival abnormalities.  RESP: No audible wheeze, cough, or visible cyanosis.  No visible retractions or increased work of breathing.    SKIN: Visible skin clear. No significant rash, abnormal pigmentation or lesions.  NEURO: Cranial nerves grossly intact.  Mentation and speech appropriate for age.  PSYCH: Mentation appears normal, affect normal/bright, judgement and insight intact, normal speech and appearance well-groomed.    Cervical Spine Exam    Inspection:       No visible deformity    Posture:      normal    Tender:      Patient points to occipital neck    Range of Motion:       Slightly limited extension and lateral rotation    Painful Motions:       extension        lateral rotation    Reviewed prior exam: 10/12/2020 normal neurologic exam with good strength    Radiology  none    Assessment:  1. Neck pain    2. Nonintractable headache, unspecified chronicity pattern, unspecified headache type      Given symptoms for 2 months and lack of improvement with 6 weeks of PT, I agree with referral for MRI Brain and Spine. More likely occipital neuralgia, less likely radicular given description of pain.  Agree with pain clinic referral for possible injection and Neurology referral.  - Could consider vestibular therapy pending results as well given dizziness.    Plan:  - Today's Plan of Care:  Obtain MRI Cervical Spine and Brain  Follow up with Pain and Neurology as Scheduled  Continue PT and Home Exercise Program    -We also discussed other future treatment options:  Consider vestibular therapy pending imaging results given dizziness    Follow Up: contact us to review MRI once done      Video-Visit Details    Type of service:  Video Visit    Video Start Time: 1:04 PM  Video End Time: 1:18 PM    Originating Location (pt. Location): Home    Distant Location (provider location):  [facility]     Platform used for Video Visit: [employer]    Concerning signs and symptoms were  reviewed.  The patient expressed understanding of this management plan and all questions were answered at this time.    Lisa Max MD [facility]  Primary Care Sports Medicine  [facility]            Again, thank you for allowing me to participate in the care of your patient.        Sincerely,        Lisa Max MD

## 2020-11-20 LAB — RADIOLOGIST FLAGS: ABNORMAL

## 2020-11-23 ENCOUNTER — OFFICE VISIT (OUTPATIENT)
Dept: ANESTHESIOLOGY | Facility: CLINIC | Age: 35
End: 2020-11-23
Attending: FAMILY MEDICINE
Payer: COMMERCIAL

## 2020-11-23 ENCOUNTER — MYC MEDICAL ADVICE (OUTPATIENT)
Dept: ORTHOPEDICS | Facility: CLINIC | Age: 35
End: 2020-11-23

## 2020-11-23 ENCOUNTER — VIRTUAL VISIT (OUTPATIENT)
Dept: ORTHOPEDICS | Facility: CLINIC | Age: 35
End: 2020-11-23
Payer: COMMERCIAL

## 2020-11-23 ENCOUNTER — TELEPHONE (OUTPATIENT)
Dept: ORTHOPEDICS | Facility: CLINIC | Age: 35
End: 2020-11-23

## 2020-11-23 VITALS — HEIGHT: 67 IN | WEIGHT: 171 LBS | BODY MASS INDEX: 26.84 KG/M2

## 2020-11-23 VITALS
DIASTOLIC BLOOD PRESSURE: 81 MMHG | WEIGHT: 171 LBS | SYSTOLIC BLOOD PRESSURE: 129 MMHG | HEART RATE: 87 BPM | HEIGHT: 67 IN | RESPIRATION RATE: 16 BRPM | BODY MASS INDEX: 26.84 KG/M2

## 2020-11-23 DIAGNOSIS — M54.2 NECK PAIN: ICD-10-CM

## 2020-11-23 DIAGNOSIS — R90.89 ABNORMAL BRAIN MRI: ICD-10-CM

## 2020-11-23 DIAGNOSIS — R51.9 NONINTRACTABLE HEADACHE, UNSPECIFIED CHRONICITY PATTERN, UNSPECIFIED HEADACHE TYPE: ICD-10-CM

## 2020-11-23 DIAGNOSIS — E34.8 PINEAL GLAND CYST: ICD-10-CM

## 2020-11-23 DIAGNOSIS — M54.2 NECK PAIN: Primary | ICD-10-CM

## 2020-11-23 PROCEDURE — 99203 OFFICE O/P NEW LOW 30 MIN: CPT | Performed by: ANESTHESIOLOGY

## 2020-11-23 PROCEDURE — 99213 OFFICE O/P EST LOW 20 MIN: CPT | Mod: 95 | Performed by: PEDIATRICS

## 2020-11-23 ASSESSMENT — MIFFLIN-ST. JEOR
SCORE: 1669.28
SCORE: 1669.28

## 2020-11-23 ASSESSMENT — PAIN SCALES - GENERAL: PAINLEVEL: EXTREME PAIN (8)

## 2020-11-23 NOTE — PATIENT INSTRUCTIONS
Reviewed MRI findings.  In the cervical spine, no structural abnormality noted.    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  Would advise further characterization with additional imaging, as the radiologist advised in the report.  This MRI order has been placed.  Would also advise seeing neurosurgery for further discussion of the brain MRI findings.  This referral has been placed, and the contact information is below.      Referral for Neurosurgery placed.   Please call location to schedule:  G: [facility] [phone]   [email or web address]   G: [facility]  Neurosurgery Clinic [phone]   [email or web address]   G: [facility] [place] [facility] [place] [phone]   [email or web address]   UM: Neurosurgery Clinic [facility] [phone]   [email or web address]         Advanced imaging is done by appointment. Some insurance require a prior authorization to be completed which may delay the time until you are able to schedule your appointment.  Please call [facility]: [phone] to schedule your MRI.  Depending on your availability you can usually schedule within the next 1-2 days.

## 2020-11-23 NOTE — PATIENT INSTRUCTIONS
Referrals:    Psychology for anxiety and panic attacks.     To make an appointment for health psychology, call any of these providers to make an appointment:    Dr. Roberto Rodriguez [phone]  Dr. Unique Regalado [phone]  Dr. Ismael Almazan [phone]  Dr. Ene Zuñiga [phone]  Dr. Maris Ayala [phone]      Recommended Follow up:      As needed        Please call [phone], option #1 to schedule your clinic appointment if you don't already have an appointment scheduled.        To speak with a nurse, schedule/reschedule/cancel a clinic appointment, or request a medication refill call: [phone], option #1.    You can also reach us by [employer]: [email or web address]
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
## 2020-11-23 NOTE — TELEPHONE ENCOUNTER
MRI BRAIN WITHOUT CONTRAST  11/19/2020 6:51 PM     HISTORY:  Headache, acute, normal neuro exam; posterior headache.  Nonintractable headache, unspecified chronicity pattern, unspecified  headache type.     TECHNIQUE:  Multiplanar, multisequence MRI of the brain without  gadolinium IV contrast material.       COMPARISON:  None.     FINDINGS: There is an ovoid cystic-appearing lesion in the region of  the pineal gland measuring up to approximately 15 mm x 12 mm x 8 mm  (series 6 image 13, series 5 image 12). The cystic lesion is slightly  more hyperintense as compared to the CSF within the ventricular system  on T2 FLAIR weighted imaging, suggesting some internal proteinaceous  content/debris. The lateral and third ventricles appear mildly  prominent, but are not frankly dilated. There is no intracranial  hemorrhage or evidence for herniation. The major vascular flow voids  of the skull base are grossly maintained. The brain parenchyma and  extra axial spaces otherwise appear normal.     Orbits appear within normal limits. There is a small probable  retention cyst or polyp in the right ethmoid cavity. Small probable  retention cyst in the alveolar recess of the left maxillary sinus. The  calvarium, skull base and mid face otherwise appear unremarkable.                                                                      IMPRESSION:  1. There is a 15 mm cystic lesion in the region of the pineal gland,  likely a benign pineal cyst. However, a cystic pineal neoplasm can  have a similar appearance and cannot be completely excluded.  2. The supratentorial ventricular system is mildly prominent in  appearance. This may be developmental. A low-grade obstructive  hydrocephalus due to mass effect on the tectum/cerebral aqueduct due  to the pineal cystic lesion is thought to be less likely, but  difficult to entirely exclude.  3. The brain parenchyma and extra axial spaces otherwise appear  normal.     Recommendation:  Follow-up MRI without and with contrast with  particular attention to the pineal region, including dedicated  phase-contrast sequences for evaluation of flow through the cerebral  aqueduct (CSF flow study).     [[facility]: Pineal cystic lesion, mildly prominent appearance of  the supratentorial ventricular system. Recommend follow-up imaging, as  detailed above]     This report will be copied to the [facility] to ensure a  provider acknowledges the finding. [facility] is available Monday  through Friday 8am-3:30 pm.      MEGHAN FARAH MD          MRI CERVICAL SPINE WITHOUT CONTRAST 11/19/2020 7:08 PM      HISTORY: Neck stiffness and pain, right greater than left. Neck pain.     TECHNIQUE: Multiplanar, multisequence MRI of the cervical spine  without contrast.      COMPARISON: CT neck from 9/9/2020.     FINDINGS: Normal vertebral body heights, alignment, and marrow signal.  No abnormal cord signal. The paraspinous soft tissues are  unremarkable.     Segmental analysis: The craniocervical junction/C1-C2: Normal.     C2-C3: Normal disc height. No herniation. Normal facets. No spinal  canal or neural foraminal stenosis.     C3-C4: Normal disc height. No herniation. Normal facets. No spinal  canal or neural foraminal stenosis.     C4-C5: Normal disc height. No herniation. Normal facets. No spinal  canal or neural foraminal stenosis.     C5-C6: Normal disc height. No herniation. Normal facets. No spinal  canal or neural foraminal stenosis.     C6-C7: Normal disc height. No herniation. Normal facets. No spinal  canal or neural foraminal stenosis.     C7-T1: Normal disc height. No herniation. Normal facets. No spinal  canal or neural foraminal stenosis.                                                                      IMPRESSION: Unremarkable cervical spine MRI.     MEGHAN FARAH MD

## 2020-11-23 NOTE — PROGRESS NOTES
"Balwinder Perez is a 35 year old male who is being evaluated via a billable telephone visit.      The patient has been notified of following:     \"This telephone visit will be conducted via a call between you and your physician/provider. We have found that certain health care needs can be provided without the need for a physical exam.  This service lets us provide the care you need with a short phone conversation.  If a prescription is necessary we can send it directly to your pharmacy.  If lab work is needed we can place an order for that and you can then stop by our lab to have the test done at a later time.    Telephone visits are billed at different rates depending on your insurance coverage. During this emergency period, for some insurers they may be billed the same as an in-person visit.  Please reach out to your insurance provider with any questions.    If during the course of the call the physician/provider feels a telephone visit is not appropriate, you will not be charged for this service.\"    Patient has given verbal consent for Telephone visit?  Yes    What phone number would you like to be contacted at? [phone]    How would you like to obtain your AVS? Dennis    Phone call duration: 22 minutes    Chandler Leon DO    Sports Medicine Clinic Visit    PCP: Tahmina Schafer    Balwinder Perez is a 35 year old male who is seen in f/u up for    Neck pain  Nonintractable headache, unspecified chronicity pattern, unspecified headache type. Since last visit on 10/12/2020 patient has undergone PT and MRI's of brain and neck.  Did have virtual visit with Dr. Max on 11/19/20 prior to completion of his MRI's.   He does not feel that PT was helpful.  He has an appointment at 11:55 this morning with pain managmeent.  He is looking for results prior to this appointment.          Review of Systems  All other systems reviewed and are negative unless noted above.    Past Medical History:   Diagnosis Date     " CARDIOVASCULAR SCREENING; LDL GOAL LESS THAN 160 2013     Past Surgical History:   Procedure Laterality Date     NO HISTORY OF SURGERY       Family History   Problem Relation Age of Onset     Thyroid Disease Mother      Cancer Father         lung cancer-passed away     Other Cancer Father         Lung     Asthma Brother      C.A.D. No family hx of      Diabetes No family hx of      Hypertension No family hx of      Cerebrovascular Disease No family hx of      Social History     Socioeconomic History     Marital status:      Spouse name: Not on file     Number of children: Not on file     Years of education: Not on file     Highest education level: Not on file   Occupational History     Not on file   Social Needs     Financial resource strain: Not on file     Food insecurity     Worry: Not on file     Inability: Not on file     Transportation needs     Medical: Not on file     Non-medical: Not on file   Tobacco Use     Smoking status: Former Smoker     Packs/day: 0.00     Years: 0.00     Pack years: 0.00     Quit date: 2012     Years since quittin.9     Smokeless tobacco: Never Used   Substance and Sexual Activity     Alcohol use: Not Currently     Drug use: Never     Sexual activity: Yes     Partners: Female   Lifestyle     Physical activity     Days per week: Not on file     Minutes per session: Not on file     Stress: Not on file   Relationships     Social connections     Talks on phone: Not on file     Gets together: Not on file     Attends Sabianist service: Not on file     Active member of club or organization: Not on file     Attends meetings of clubs or organizations: Not on file     Relationship status: Not on file     Intimate partner violence     Fear of current or ex partner: Not on file     Emotionally abused: Not on file     Physically abused: Not on file     Forced sexual activity: Not on file   Other Topics Concern     Parent/sibling w/ CABG, MI or angioplasty before 65F 55M? No  "  Social History Narrative     Not on file         Objective  Ht 1.702 m (5' 7\")   Wt 77.6 kg (171 lb)   BMI 26.78 kg/m      GENERAL APPEARANCE: healthy, alert and no distress   GAIT:  SKIN:   NEURO: mentation intact and speech normal  PSYCH:  mentation appears normal and affect normal/bright  HEENT:   CV:   RESP: nonlabored breathing      Exam  None additional      Radiology  Visualized the MRI studies noted below, and reviewed the images and the report with the patient.      MRI BRAIN WITHOUT CONTRAST  11/19/2020 6:51 PM     HISTORY:  Headache, acute, normal neuro exam; posterior headache.  Nonintractable headache, unspecified chronicity pattern, unspecified  headache type.     TECHNIQUE:  Multiplanar, multisequence MRI of the brain without  gadolinium IV contrast material.       COMPARISON:  None.     FINDINGS: There is an ovoid cystic-appearing lesion in the region of  the pineal gland measuring up to approximately 15 mm x 12 mm x 8 mm  (series 6 image 13, series 5 image 12). The cystic lesion is slightly  more hyperintense as compared to the CSF within the ventricular system  on T2 FLAIR weighted imaging, suggesting some internal proteinaceous  content/debris. The lateral and third ventricles appear mildly  prominent, but are not frankly dilated. There is no intracranial  hemorrhage or evidence for herniation. The major vascular flow voids  of the skull base are grossly maintained. The brain parenchyma and  extra axial spaces otherwise appear normal.     Orbits appear within normal limits. There is a small probable  retention cyst or polyp in the right ethmoid cavity. Small probable  retention cyst in the alveolar recess of the left maxillary sinus. The  calvarium, skull base and mid face otherwise appear unremarkable.                                                                      IMPRESSION:  1. There is a 15 mm cystic lesion in the region of the pineal gland,  likely a benign pineal cyst. However, a " cystic pineal neoplasm can  have a similar appearance and cannot be completely excluded.  2. The supratentorial ventricular system is mildly prominent in  appearance. This may be developmental. A low-grade obstructive  hydrocephalus due to mass effect on the tectum/cerebral aqueduct due  to the pineal cystic lesion is thought to be less likely, but  difficult to entirely exclude.  3. The brain parenchyma and extra axial spaces otherwise appear  normal.     Recommendation: Follow-up MRI without and with contrast with  particular attention to the pineal region, including dedicated  phase-contrast sequences for evaluation of flow through the cerebral  aqueduct (CSF flow study).     [Access Center: Pineal cystic lesion, mildly prominent appearance of  the supratentorial ventricular system. Recommend follow-up imaging, as  detailed above]     This report will be copied to the [facility] to ensure a  provider acknowledges the finding. [facility] is available Monday  through Friday 8am-3:30 pm.      MEGHAN FARAH MD    =======================  MRI CERVICAL SPINE WITHOUT CONTRAST 11/19/2020 7:08 PM      HISTORY: Neck stiffness and pain, right greater than left. Neck pain.     TECHNIQUE: Multiplanar, multisequence MRI of the cervical spine  without contrast.      COMPARISON: CT neck from 9/9/2020.     FINDINGS: Normal vertebral body heights, alignment, and marrow signal.  No abnormal cord signal. The paraspinous soft tissues are  unremarkable.     Segmental analysis: The craniocervical junction/C1-C2: Normal.     C2-C3: Normal disc height. No herniation. Normal facets. No spinal  canal or neural foraminal stenosis.     C3-C4: Normal disc height. No herniation. Normal facets. No spinal  canal or neural foraminal stenosis.     C4-C5: Normal disc height. No herniation. Normal facets. No spinal  canal or neural foraminal stenosis.     C5-C6: Normal disc height. No herniation. Normal facets. No spinal  canal or  neural foraminal stenosis.     C6-C7: Normal disc height. No herniation. Normal facets. No spinal  canal or neural foraminal stenosis.     C7-T1: Normal disc height. No herniation. Normal facets. No spinal  canal or neural foraminal stenosis.                                                                      IMPRESSION: Unremarkable cervical spine MRI.     MEGHAN FARAH MD    Assessment:  1. Neck pain    2. Nonintractable headache, unspecified chronicity pattern, unspecified headache type    3. Pineal gland cyst    4. Abnormal brain MRI        Plan:  Discussed the assessment with the patient.  Will obtain additional brain MRI with contrast as noted.  We also discussed seeing neurosurgery, given the structural abnormality noted.  Plan additional MRI first for better characterization, but will also go ahead and place referral to discuss further with neurosurgery.  Advise he schedule that after the MRI has been obtained and results known.  Regarding ongoing neck pain, last visit here was over a month ago.  MRI unremarkable.  He has been seen in the emergency department in the interim, and was advised to see pain management for consideration of injection therapy.  I think this would be fine to do, and he can proceed with that visit today.  Alternatively, if he would prefer to hold off with injection therapy and focus on further investigation of the suspected pineal cyst, that would be fine as well.  Follow up: here will be open ended, pending results from additional brain MRI, anticipating seeing neurosurgery, as well as seeing pain management.  Questions answered. Discussed signs and symptoms that may indicate more serious issues; the patient was instructed to seek appropriate care if noted. Ziad indicates understanding of these issues and agrees with the plan.      Chandler Leon, , CAQ          This note consists of symbols derived from keyboarding, dictation and/or voice recognition software. As a result,  there may be errors in the script that have gone undetected. Please consider this when interpreting information found in this chart.

## 2020-11-23 NOTE — LETTER
"    11/23/2020         RE: Balwinder Perez  [address] [facility] [address]        Dear Colleague,    Thank you for referring your patient, Balwinder Perez, to the [facility]. Please see a copy of my visit note below.    Balwinder Perez is a 35 year old male who is being evaluated via a billable telephone visit.      The patient has been notified of following:     \"This telephone visit will be conducted via a call between you and your physician/provider. We have found that certain health care needs can be provided without the need for a physical exam.  This service lets us provide the care you need with a short phone conversation.  If a prescription is necessary we can send it directly to your pharmacy.  If lab work is needed we can place an order for that and you can then stop by our lab to have the test done at a later time.    Telephone visits are billed at different rates depending on your insurance coverage. During this emergency period, for some insurers they may be billed the same as an in-person visit.  Please reach out to your insurance provider with any questions.    If during the course of the call the physician/provider feels a telephone visit is not appropriate, you will not be charged for this service.\"    Patient has given verbal consent for Telephone visit?  Yes    What phone number would you like to be contacted at? [phone]    How would you like to obtain your AVS? Dennis    Phone call duration: 22 minutes    Chandler Leon DO    Sports Medicine Clinic Visit    PCP: Tahmina Schafer    Balwinder Perez is a 35 year old male who is seen in f/u up for    Neck pain  Nonintractable headache, unspecified chronicity pattern, unspecified headache type. Since last visit on 10/12/2020 patient has undergone PT and MRI's of brain and neck.  Did have virtual visit with Dr. Max on 11/19/20 prior to completion of his MRI's.   He does not feel that PT was helpful.  He " has an appointment at 11:55 this morning with pain managmeent.  He is looking for results prior to this appointment.          Review of Systems  All other systems reviewed and are negative unless noted above.    Past Medical History:   Diagnosis Date     CARDIOVASCULAR SCREENING; LDL GOAL LESS THAN 160 2013     Past Surgical History:   Procedure Laterality Date     NO HISTORY OF SURGERY       Family History   Problem Relation Age of Onset     Thyroid Disease Mother      Cancer Father         lung cancer-passed away     Other Cancer Father         Lung     Asthma Brother      C.A.D. No family hx of      Diabetes No family hx of      Hypertension No family hx of      Cerebrovascular Disease No family hx of      Social History     Socioeconomic History     Marital status:      Spouse name: Not on file     Number of children: Not on file     Years of education: Not on file     Highest education level: Not on file   Occupational History     Not on file   Social Needs     Financial resource strain: Not on file     Food insecurity     Worry: Not on file     Inability: Not on file     Transportation needs     Medical: Not on file     Non-medical: Not on file   Tobacco Use     Smoking status: Former Smoker     Packs/day: 0.00     Years: 0.00     Pack years: 0.00     Quit date: 2012     Years since quittin.9     Smokeless tobacco: Never Used   Substance and Sexual Activity     Alcohol use: Not Currently     Drug use: Never     Sexual activity: Yes     Partners: Female   Lifestyle     Physical activity     Days per week: Not on file     Minutes per session: Not on file     Stress: Not on file   Relationships     Social connections     Talks on phone: Not on file     Gets together: Not on file     Attends [employer] service: Not on file     Active member of club or organization: Not on file     Attends meetings of clubs or organizations: Not on file     Relationship status: Not on file     Intimate partner  "violence     Fear of current or ex partner: Not on file     Emotionally abused: Not on file     Physically abused: Not on file     Forced sexual activity: Not on file   Other Topics Concern     Parent/sibling w/ CABG, MI or angioplasty before 65F 55M? No   Social History Narrative     Not on file         Objective  Ht 1.702 m (5' 7\")   Wt 77.6 kg (171 lb)   BMI 26.78 kg/m      GENERAL APPEARANCE: healthy, alert and no distress   GAIT:  SKIN:   NEURO: mentation intact and speech normal  PSYCH:  mentation appears normal and affect normal/bright  HEENT:   CV:   RESP: nonlabored breathing      Exam  None additional      Radiology  Visualized the MRI studies noted below, and reviewed the images and the report with the patient.      MRI BRAIN WITHOUT CONTRAST  11/19/2020 6:51 PM     HISTORY:  Headache, acute, normal neuro exam; posterior headache.  Nonintractable headache, unspecified chronicity pattern, unspecified  headache type.     TECHNIQUE:  Multiplanar, multisequence MRI of the brain without  gadolinium IV contrast material.       COMPARISON:  None.     FINDINGS: There is an ovoid cystic-appearing lesion in the region of  the pineal gland measuring up to approximately 15 mm x 12 mm x 8 mm  (series 6 image 13, series 5 image 12). The cystic lesion is slightly  more hyperintense as compared to the CSF within the ventricular system  on T2 FLAIR weighted imaging, suggesting some internal proteinaceous  content/debris. The lateral and third ventricles appear mildly  prominent, but are not frankly dilated. There is no intracranial  hemorrhage or evidence for herniation. The major vascular flow voids  of the skull base are grossly maintained. The brain parenchyma and  extra axial spaces otherwise appear normal.     Orbits appear within normal limits. There is a small probable  retention cyst or polyp in the right ethmoid cavity. Small probable  retention cyst in the alveolar recess of the left maxillary sinus. " The  calvarium, skull base and mid face otherwise appear unremarkable.                                                                      IMPRESSION:  1. There is a 15 mm cystic lesion in the region of the pineal gland,  likely a benign pineal cyst. However, a cystic pineal neoplasm can  have a similar appearance and cannot be completely excluded.  2. The supratentorial ventricular system is mildly prominent in  appearance. This may be developmental. A low-grade obstructive  hydrocephalus due to mass effect on the tectum/cerebral aqueduct due  to the pineal cystic lesion is thought to be less likely, but  difficult to entirely exclude.  3. The brain parenchyma and extra axial spaces otherwise appear  normal.     Recommendation: Follow-up MRI without and with contrast with  particular attention to the pineal region, including dedicated  phase-contrast sequences for evaluation of flow through the cerebral  aqueduct (CSF flow study).     [Access Center: Pineal cystic lesion, mildly prominent appearance of  the supratentorial ventricular system. Recommend follow-up imaging, as  detailed above]     This report will be copied to the [facility] to ensure a  provider acknowledges the finding. [facility] Center is available Monday  through Friday 8am-3:30 pm.      MEGHAN FARAH MD    =======================  MRI CERVICAL SPINE WITHOUT CONTRAST 11/19/2020 7:08 PM      HISTORY: Neck stiffness and pain, right greater than left. Neck pain.     TECHNIQUE: Multiplanar, multisequence MRI of the cervical spine  without contrast.      COMPARISON: CT neck from 9/9/2020.     FINDINGS: Normal vertebral body heights, alignment, and marrow signal.  No abnormal cord signal. The paraspinous soft tissues are  unremarkable.     Segmental analysis: The craniocervical junction/C1-C2: Normal.     C2-C3: Normal disc height. No herniation. Normal facets. No spinal  canal or neural foraminal stenosis.     C3-C4: Normal disc height. No  herniation. Normal facets. No spinal  canal or neural foraminal stenosis.     C4-C5: Normal disc height. No herniation. Normal facets. No spinal  canal or neural foraminal stenosis.     C5-C6: Normal disc height. No herniation. Normal facets. No spinal  canal or neural foraminal stenosis.     C6-C7: Normal disc height. No herniation. Normal facets. No spinal  canal or neural foraminal stenosis.     C7-T1: Normal disc height. No herniation. Normal facets. No spinal  canal or neural foraminal stenosis.                                                                      IMPRESSION: Unremarkable cervical spine MRI.     MEGHAN FARAH MD    Assessment:  1. Neck pain    2. Nonintractable headache, unspecified chronicity pattern, unspecified headache type    3. Pineal gland cyst    4. Abnormal brain MRI        Plan:  Discussed the assessment with the patient.  Will obtain additional brain MRI with contrast as noted.  We also discussed seeing neurosurgery, given the structural abnormality noted.  Plan additional MRI first for better characterization, but will also go ahead and place referral to discuss further with neurosurgery.  Advise he schedule that after the MRI has been obtained and results known.  Regarding ongoing neck pain, last visit here was over a month ago.  MRI unremarkable.  He has been seen in the emergency department in the interim, and was advised to see pain management for consideration of injection therapy.  I think this would be fine to do, and he can proceed with that visit today.  Alternatively, if he would prefer to hold off with injection therapy and focus on further investigation of the suspected pineal cyst, that would be fine as well.  Follow up: here will be open ended, pending results from additional brain MRI, anticipating seeing neurosurgery, as well as seeing pain management.  Questions answered. Discussed signs and symptoms that may indicate more serious issues; the patient was instructed  to seek appropriate care if noted. Ziad indicates understanding of these issues and agrees with the plan.      Chandler Leon DO, CAQ          This note consists of symbols derived from keyboarding, dictation and/or voice recognition software. As a result, there may be errors in the script that have gone undetected. Please consider this when interpreting information found in this chart.          Again, thank you for allowing me to participate in the care of your patient.        Sincerely,        Chandler Leon DO

## 2020-11-23 NOTE — TELEPHONE ENCOUNTER
I would like to be able to review with pt.  I'm ok just calling him with results, to discuss briefly, and discuss next step(s).  Otherwise, if he wants to have a phone or video visit, can schedule that.  Please contact pt to see if I can just connect with him late morning or over lunch. If he wants a more formal visit to discuss, can schedule for today.  Thanks.  Chandler Leon DO, CAQ
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
## 2020-11-24 NOTE — PROGRESS NOTES
LPN reviewed AVS with Pt.  Pt verbalized an understanding of information, and was asked to contact clinic with questions.    Follow up recommended by provider: as needed with Dr. Gillespie.     Lsia Chavez LPN

## 2020-11-25 ENCOUNTER — HOSPITAL ENCOUNTER (OUTPATIENT)
Dept: MRI IMAGING | Facility: CLINIC | Age: 35
Discharge: HOME | End: 2020-11-25
Attending: PEDIATRICS | Admitting: PEDIATRICS
Payer: COMMERCIAL

## 2020-11-25 DIAGNOSIS — R51.9 NONINTRACTABLE HEADACHE, UNSPECIFIED CHRONICITY PATTERN, UNSPECIFIED HEADACHE TYPE: ICD-10-CM

## 2020-11-25 DIAGNOSIS — E34.8 PINEAL GLAND CYST: ICD-10-CM

## 2020-11-25 PROCEDURE — A9585 GADOBUTROL INJECTION: HCPCS | Performed by: PEDIATRICS

## 2020-11-25 PROCEDURE — 255N000002 HC RX 255 OP 636: Performed by: PEDIATRICS

## 2020-11-25 PROCEDURE — 70553 MRI BRAIN STEM W/O & W/DYE: CPT

## 2020-11-25 PROCEDURE — 70553 MRI BRAIN STEM W/O & W/DYE: CPT | Mod: 26 | Performed by: RADIOLOGY

## 2020-11-25 RX ORDER — GADOBUTROL 604.72 MG/ML
7.5 INJECTION INTRAVENOUS ONCE
Status: COMPLETED | OUTPATIENT
Start: 2020-11-25 | End: 2020-11-25

## 2020-11-25 RX ADMIN — GADOBUTROL 7.5 ML: 604.72 INJECTION INTRAVENOUS at 15:23

## 2020-11-27 ENCOUNTER — TELEPHONE (OUTPATIENT)
Dept: ORTHOPEDICS | Facility: CLINIC | Age: 35
End: 2020-11-27

## 2020-11-27 NOTE — TELEPHONE ENCOUNTER
He is already using muscle relaxant and NSAID. Would suggest acetaminophen (Tylenol) as an addition. Acetaminophen 500-1000 mg three times daily as needed; max daily dosing 3000 mg per day.  Thanks.  Chandler Leon DO, CAQ

## 2020-11-27 NOTE — TELEPHONE ENCOUNTER
Discussed MRI results and recommendations with patient. He is currently scheduled for Neurosurg on 12/1/20. [facility] Patient would like to know if Dr Leon can prescribe or recommend a medication for his pain in his head and neck. He is currently taking flexeril, ibuprofen and Zoloft. He would like a message sent to [facility] with recommendations.    John Greenwood, JUSTICE, LAT

## 2020-11-27 NOTE — TELEPHONE ENCOUNTER
"Please advise of findings; per report \"simple pineal cyst\" though there are actually several small cysts per the report.  See report for specifics.  Would have him proceed with seeing neurosurgery for further discussion and management (appointment noted in Epic for 12/1).  Thanks.  Chandler Leon, DO, CAQ    "

## 2020-11-27 NOTE — TELEPHONE ENCOUNTER
Results for orders placed or performed during the hospital encounter of 11/25/20   MR Brain w/o & w Contrast    Narrative    MRI of the brain without and with intravenous contrast: 11/25/2020    COMPARISON: MRI without contrast 11/19/2020.    HISTORY: Headaches, pineal cyst compressing the quadrigeminal plate.  Further evaluate the pineal cyst and for patency of the cerebral  aqueduct.    TECHNIQUE: High and low resolution T2 space sagittal T2-weighted  images through the midline were obtained. In addition,  three-dimensional contrast-enhanced MP rage images of the brain also  obtained.    Contrast: 7.5ml gadavist    FINDINGS: With dedicated sequences, there are several tiny cysts, with  the largest measuring 1.6 x 1.2 cm. This cyst does narrow the entrance  to the cerebral aqueduct. However, the lower resolution space images  clearly demonstrate flow through the narrowed cerebral aqueduct, also  with flow through the foramen of Luschka. On the postcontrast images,  there is no abnormal enhancement of these pineal cyst.      Impression    IMPRESSION: Simple pineal cyst, which narrows the entrance to the  cerebral aqueduct to less than 1 mm. While there is no donald  cardiomegaly at this time, if this cyst were to enlarge, rapid  hydrocephalus can develop. Consider continued MRI follow-up with  similar high-resolution sequences.    JOSÉ MIGUEL JIMENES MD

## 2020-11-30 ENCOUNTER — PRE VISIT (OUTPATIENT)
Dept: NEUROSURGERY | Facility: CLINIC | Age: 35
End: 2020-11-30

## 2020-11-30 NOTE — TELEPHONE ENCOUNTER
FUTURE VISIT INFORMATION      FUTURE VISIT INFORMATION:    Date: 12/1/2020    Time: 1230pm    [place] [facility] [place]  REFERRAL INFORMATION:    Referring provider:  Dr. Leon    Referring providers clinic:  [facility]    Reason for visit/diagnosis  Abnormal MRI    RECORDS REQUESTED FROM:       Clinic name Comments Records Status Imaging Status   Internal MR Brain -11/19/2020, 11/25/2020    IWONA Leon-11/23/2020    ED visit 11/6/2020    [facility]-11/3/2020 Epic  PACS

## 2020-12-01 ENCOUNTER — VIRTUAL VISIT (OUTPATIENT)
Dept: NEUROSURGERY | Facility: CLINIC | Age: 35
End: 2020-12-01
Payer: COMMERCIAL

## 2020-12-01 DIAGNOSIS — E34.8 CYST OF PINEAL GLAND: Primary | ICD-10-CM

## 2020-12-01 DIAGNOSIS — G91.1 ACQUIRED OBSTRUCTIVE HYDROCEPHALUS (H): ICD-10-CM

## 2020-12-01 PROCEDURE — 99205 OFFICE O/P NEW HI 60 MIN: CPT | Mod: 95 | Performed by: NEUROLOGICAL SURGERY

## 2020-12-01 NOTE — PROGRESS NOTES
"Balwinder Perez is a 35 year old male who is being evaluated via a billable video visit.      The patient has been notified of following:     \"This video visit will be conducted via a call between you and your physician/provider. We have found that certain health care needs can be provided without the need for an in-person physical exam.  This service lets us provide the care you need with a video conversation.  If a prescription is necessary we can send it directly to your pharmacy.  If lab work is needed we can place an order for that and you can then stop by our lab to have the test done at a later time.    Video visits are billed at different rates depending on your insurance coverage.  Please reach out to your insurance provider with any questions.    If during the course of the call the physician/provider feels a video visit is not appropriate, you will not be charged for this service.\"    Patient has given verbal consent for Video visit? YES  How would you like to obtain your AVS? [email or web address]  If you are dropped from the video visit, the video invite should be resent to:   Will anyone else be joining your video visit?         Video-Visit Details    Type of service:  Video Visit    Video Start Time: 12:23 PM  Video End Time: 1:38 PM    Originating Location (pt. Location): Home    Distant Location (provider location):  [facility] NEUROSURGERY [facility]     Platform used for Video Visit: flakita Reynaga, EMT  "

## 2020-12-01 NOTE — LETTER
12/1/2020       RE: Balwinder Perez  [address] [address]     Dear Colleague,    Thank you for referring your patient, Balwinder Perez, to the [facility] NEUROSURGERY CLINIC [place] [facility]. Please see a copy of my visit note below.    Neurosurgery Clinic Note    Reason for Visit: headaches and brain lesion    History of Present Illness  Balwinder is a previously healthy 35 year-old gentleman with no past medical history who developed relatively sudden-onset neck pain that he associated with a lump on the right side. He was evaluated by his PCP who performed a CT scan which revealed no major findings. He was treated for a inflamed muscle and promptly started developing significant frontal headaches that progressed to include his face and cramping in his temporalis muscles. He noticed tenderness around the base of his skull and intermittent episodes of nausea, dizziness, ringing of the ears, and visual disturbances. These symptoms have progressively worsened since then and he was treated with physical therapy, which he felt provided no change or worsened his symptoms.    He is growing increasingly concerned about how his symptoms are affecting his daily life and concerned about these periods of disabling headaches and dizziness with nausea and tinnitus. He has trouble focusing, which is really new for him. He has tried tylenol, ibuprofen, icy hot, and oxycodone, but nothing seems to help at this time. When he wakes up in the morning he feels relatively well though there have been times when he has significant dizziness and headaches in the middle of the night. Once he gets out of bed, he quickly begins experiencing the same symptoms.     He eventually went to the ER where he was diagnosed with occipital neuralgia, which was later revised during a pain management visit.     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 His company has been supportive of him being at home, and while he thinks he has experienced stress, it hasn't been more than most.         Tobacco Use     Smoking status: Former Smoker     Packs/day: 0.00     Years: 0.00     Pack years: 0.00     Quit date: 2012     Years since quittin.0     Smokeless tobacco: Never Used   Substance and Sexual Activity     Alcohol use: Not Currently     Drug use: Never     Sexual activity: Yes     Partners: Female       Current Outpatient Medications   Medication     cyclobenzaprine (FLEXERIL) 10 MG tablet     ibuprofen 200 MG capsule     multivitamin, therapeutic with minerals (MULTI-VITAMIN) TABS     sertraline (ZOLOFT) 50 MG tablet     No current facility-administered medications for this visit.         ROS: 10 point ROS neg other than the symptoms noted above in the HPI.    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 At this time, he continues to accommodate without overt warning signs. However he is significantly disabled by the symptoms. It is hard to know how long the natural course of intermittent  "obstructive hydrocephalus would be until even more worrisome and potentially dire symptoms arise. At this time, we discussed my recommendations of an endoscopic cyst fenestration and biopsy +/- EVD and potential shunt placement. We discussed the diagnosis, prognosis, procedure as well as the alternatives. We discussed the risks of the procedure including infection, bleeding, stroke, or very rarely death. He and his wife asked excellent questions and will take some time to decide about the course of action, though Balwinder seems eager to remove the cyst. We will get him scheduled within the next 3-4 weeks so we can prevent any serious sequelae of hydrocephalus.    Jordin Graves MD  Department of Neurosurgery  [facility]      Balwinder Perez is a 35 year old male who is being evaluated via a billable video visit.      The patient has been notified of following:     \"This video visit will be conducted via a call between you and your physician/provider. We have found that certain health care needs can be provided without the need for an in-person physical exam.  This service lets us provide the care you need with a video conversation.  If a prescription is necessary we can send it directly to your pharmacy.  If lab work is needed we can place an order for that and you can then stop by our lab to have the test done at a later time.    Video visits are billed at different rates depending on your insurance coverage.  Please reach out to your insurance provider with any questions.    If during the course of the call the physician/provider feels a video visit is not appropriate, you will not be charged for this service.\"    Patient has given verbal consent for Video visit? YES  How would you like to obtain your AVS? [email or web address]  If you are dropped from the video visit, the video invite should be resent to:   Will anyone else be joining your video visit?     Video-Visit Details    Type of service:  Video Visit    Video Start " Time: 12:23 PM  Video End Time: 1:38 PM    Originating Location (pt. Location): Home    Distant Location (provider location):  [facility] NEUROSURGERY [facility]     Platform used for Video Visit: flakita Reynaga, EMT

## 2020-12-01 NOTE — PROGRESS NOTES
Neurosurgery Clinic Note    Reason for Visit: headaches and brain lesion    History of Present Illness  Balwinder is a previously healthy 35 year-old gentleman with no past medical history who developed relatively sudden-onset neck pain that he associated with a lump on the right side. He was evaluated by his PCP who performed a CT scan which revealed no major findings. He was treated for a inflamed muscle and promptly started developing significant frontal headaches that progressed to include his face and cramping in his temporalis muscles. He noticed tenderness around the base of his skull and intermittent episodes of nausea, dizziness, ringing of the ears, and visual disturbances. These symptoms have progressively worsened since then and he was treated with physical therapy, which he felt provided no change or worsened his symptoms.    He is growing increasingly concerned about how his symptoms are affecting his daily life and concerned about these periods of disabling headaches and dizziness with nausea and tinnitus. He has trouble focusing, which is really new for him. He has tried tylenol, ibuprofen, icy hot, and oxycodone, but nothing seems to help at this time. When he wakes up in the morning he feels relatively well though there have been times when he has significant dizziness and headaches in the middle of the night. Once he gets out of bed, he quickly begins experiencing the same symptoms.     He eventually went to the ER where he was diagnosed with occipital neuralgia, which was later revised during a pain management visit.       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 His company has been supportive of him being at home, and while he thinks he has experienced stress, it hasn't been more than most.         Tobacco Use     Smoking status: Former Smoker     Packs/day: 0.00     Years:  0.00     Pack years: 0.00     Quit date: 2012     Years since quittin.0     Smokeless tobacco: Never Used   Substance and Sexual Activity     Alcohol use: Not Currently     Drug use: Never     Sexual activity: Yes     Partners: Female       Current Outpatient Medications   Medication     cyclobenzaprine (FLEXERIL) 10 MG tablet     ibuprofen 200 MG capsule     multivitamin, therapeutic with minerals (MULTI-VITAMIN) TABS     sertraline (ZOLOFT) 50 MG tablet     No current facility-administered medications for this visit.         ROS: 10 point ROS neg other than the symptoms noted above in the HPI.      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 At this time, he continues to accommodate without overt warning signs. However he is significantly disabled by the symptoms. It is hard to know how long the natural course of intermittent obstructive hydrocephalus would be until even more worrisome and potentially dire symptoms arise. At this time, we discussed my recommendations of an endoscopic cyst fenestration and biopsy +/- EVD and potential shunt placement. We discussed the diagnosis, prognosis, procedure as well as the alternatives.  We discussed the risks of the procedure including infection, bleeding, stroke, or very rarely death. He and his wife asked excellent questions and will take some time to decide about the course of action, though Balwinder seems eager to remove the cyst. We will get him scheduled within the next 3-4 weeks so we can prevent any serious sequelae of hydrocephalus.    Jordin Graves MD  Department of Neurosurgery  [facility]

## 2020-12-02 ENCOUNTER — PATIENT OUTREACH (OUTPATIENT)
Dept: NEUROSURGERY | Facility: CLINIC | Age: 35
End: 2020-12-02

## 2020-12-02 ENCOUNTER — TELEPHONE (OUTPATIENT)
Dept: NEUROSURGERY | Facility: CLINIC | Age: 35
End: 2020-12-02

## 2020-12-02 NOTE — PATIENT INSTRUCTIONS
1. PAC clinic for assessment and covid test before surgery  2. OR for endoscopic cyst fenestration

## 2020-12-02 NOTE — TELEPHONE ENCOUNTER
[facility] Call Center    Phone Message    May a detailed message be left on voicemail: yes     Reason for Call: Other: Patients spouse calling in regards to hearing about scheduling patient for surgery with Dr. Graves. States they have been waiting for a call since yesterdays virtual appointment and are just following up.    Georgiana is requesting that patient be called back to follow up at [phone]    Please advise and call patient back at your earliest convenience to discuss     Action Taken: Other: [facility] NEUR[facility]GERY     Travel Screening: Not Applicable

## 2020-12-02 NOTE — TELEPHONE ENCOUNTER
[facility] Call Center    Phone Message    May a detailed message be left on voicemail: yes     Reason for Call: Other: Patients spouse Georgiana calling to check status - states that she really needs a call back ASAP today.     Please advise and call back at [phone] at your earliest convenience     Action Taken: Other: [facility] NEUROSURGERY     Travel Screening: Not Applicable

## 2020-12-03 DIAGNOSIS — Z11.59 ENCOUNTER FOR SCREENING FOR OTHER VIRAL DISEASES: Primary | ICD-10-CM

## 2020-12-03 NOTE — TELEPHONE ENCOUNTER
Patient is scheduled for surgery with Dr. Graves      Spoke or left message with: Patient    Date of Surgery: 12/23/20    Location [facility] OR    H&P: Scheduled with PAC 12/16    Post op: 01/12/21    Additional imaging/appointments: COID test 12/21    Surgery packet sent: The nurse will mail out     Additional comments: The patient is aware they need a PRE-OP/PAC appt at least a couple of weeks before surgery date. We went over the patient needing a  and someone to stay with them for 24 hours after the surgery. The patient was given my direct number for any questions [phone]

## 2020-12-04 NOTE — TELEPHONE ENCOUNTER
FUTURE VISIT INFORMATION      SURGERY INFORMATION:    Date: 12/23/20    Location: [facility] OR    Surgeon:  Jordin Graves MD Guillaume, Daniel James, MD    Anesthesia Type:  General    Procedure: stealth assisted fenstration of cyst, possible ventriculostomy, INSERTION, SHUNT, VENTRICULOPERITONEAL, USING OPTICAL TRACKING SYSTEM    Consult: virtual visit 12/1    RECORDS REQUESTED FROM:       Primary Care Provider:  Tahmina Schafer PA-C- [facility]    Most recent EKG+ Tracing: 3/18/17

## 2020-12-16 ENCOUNTER — ANESTHESIA EVENT (OUTPATIENT)
Dept: SURGERY | Facility: CLINIC | Age: 35
DRG: 027 | End: 2020-12-16
Payer: COMMERCIAL

## 2020-12-16 ENCOUNTER — PRE VISIT (OUTPATIENT)
Dept: SURGERY | Facility: CLINIC | Age: 35
End: 2020-12-16

## 2020-12-16 ENCOUNTER — VIRTUAL VISIT (OUTPATIENT)
Dept: SURGERY | Facility: CLINIC | Age: 35
End: 2020-12-16
Payer: COMMERCIAL

## 2020-12-16 VITALS — BODY MASS INDEX: 26.37 KG/M2 | WEIGHT: 168 LBS | HEIGHT: 67 IN

## 2020-12-16 DIAGNOSIS — Z01.818 PREOP EXAMINATION: Primary | ICD-10-CM

## 2020-12-16 PROCEDURE — 99203 OFFICE O/P NEW LOW 30 MIN: CPT | Mod: 95 | Performed by: CLINICAL NURSE SPECIALIST

## 2020-12-16 ASSESSMENT — MIFFLIN-ST. JEOR: SCORE: 1655.67

## 2020-12-16 ASSESSMENT — PAIN SCALES - GENERAL: PAINLEVEL: WORST PAIN (10)

## 2020-12-16 ASSESSMENT — LIFESTYLE VARIABLES: TOBACCO_USE: 1

## 2020-12-16 NOTE — H&P
Pre-Operative H & P     CC:  Preoperative exam to assess for increased cardiopulmonary risk while undergoing surgery and anesthesia.    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 Graves on 20 at [facility]. History is obtained from the patient and medical records.     At the beginning of this visit patient ID was confirmed using name and .     Video-Visit Details    Type of service:  Video Visit    Patient verbally consented to video service today: YES      Video Start Time: 10:03am  Video End Time (time video stopped): 10:13am    Originating Location (pt. Location): Home    Distant Location (provider location):  [facility] PREOPERATIVE ASSESSMENT CENTER    Mode of Communication:  Video Conference via Infinio    Patient who developed sudden onset neck pain associated with a lump on the right side. A CT ordered by his PCP showed no significant findings. He was initially treated for an inflamed muscle but then began to develop significant frontal headaches which progressed to include his face and cramping in his temporalis muscles. He noticed tenderness around the base of his skull and intermittent episodes of nausea, dizziness, ringing of the ears, and visual disturbances. He was referred for physical therapy which provided no relief and was felt to worsen his symptoms. He has also attempted conservative measures including tylenol, ibuprofen, icy hot, and oxycodone, but nothing seems to help at this time.     He eventually went to the ER where he was diagnosed with occipital neuralgia, which was later revised during a  pain management visit. He was referred to Dr. Graves for further evaluation. An MRI suggestive of mild obstructive hydrocephalus and pineal region cyst hovering over the aqueduct suggested a role of intermittent hydrocephalus. At this time, he continues to accommodate without overt warning signs. He is significantly disabled by symptoms. He was counseled for above procedures.    Today patient denies fever, cough, shortness of breath, chest pain, or irregular HR. His history is otherwise significant for anxiety.      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      Hx of abnormal bleeding or anti-platelet use: Denies.     Menstrual history: No LMP for male patient.    Steroid use in the last year: Denies.     Personal or FH with difficulty with Anesthesia:  Denies.     Prior to Admission Medications  Current Outpatient Medications   Medication Sig Dispense Refill     ibuprofen 200 MG capsule Take 400 mg by mouth every 4 hours as needed for fever 40 capsule 0     multivitamin, therapeutic with minerals (MULTI-VITAMIN) TABS Take 1 tablet by mouth daily       OXYCODONE HCL PO Take 5 mg by mouth every 8 hours       sertraline (ZOLOFT) 50 MG tablet Take 1 tablet by mouth every evening         Allergies  No Known Allergies    Social History  Social History     Socioeconomic History     Marital status:      Spouse name: Not on file     Number of children: Not on file     Years of education: Not on file     Highest education level: Not on file   Occupational History     Not on file   Social Needs     Financial resource strain: Not on file     Food insecurity     Worry: Not on file      Inability: Not on file     Transportation needs     Medical: Not on file     Non-medical: Not on file   Tobacco Use     Smoking status: Former Smoker     Packs/day: 0.00     Years: 0.00     Pack years: 0.00     Quit date: 2012     Years since quittin.0     Smokeless tobacco: Never Used   Substance and Sexual Activity     Alcohol use: Not Currently     Drug use: Never     Sexual activity: Yes     Partners: Female   Lifestyle     Physical activity     Days per week: Not on file     Minutes per session: Not on file     Stress: Not on file   Relationships     Social connections     Talks on phone: Not on file     Gets together: Not on file     Attends [employer] service: Not on file     Active member of club or organization: Not on file     Attends meetings of clubs or organizations: Not on file     Relationship status: Not on file     Intimate partner violence     Fear of current or ex partner: Not on file     Emotionally abused: Not on file     Physically abused: Not on file     Forced sexual activity: Not on file   Other Topics Concern     Parent/sibling w/ CABG, MI or angioplasty before 65F 55M? No   Social History Narrative     Not on file       Family History  Family History   Problem Relation Age of Onset     Thyroid Disease Mother      Cancer Father         lung cancer-passed away     Other Cancer Father         Lung     Asthma Brother      C.A.D. No family hx of      Diabetes No family hx of      Hypertension No family hx of      Cerebrovascular Disease No family hx of      Personally reviewed. Lab update planned for DOS.    LABS:  CBC:   Lab Results   Component Value Date    WBC 7.1 2014    HGB 15.6 2014    HGB 15.6 2014    HCT 46.5 2014     2014     BMP:   Lab Results   Component Value Date     2020    POTASSIUM 4.0 2020    CHLORIDE 106 2020    CO2 31 2020    BUN 11 2020    CR 0.87 2020    CR 0.79 2014    GLC 86  "11/03/2020    GLC 82 04/22/2014     COAGS: No results found for: PTT, INR, FIBR  POC: No results found for: BGM, HCG, HCGS  OTHER:   Lab Results   Component Value Date    KELLY 9.4 11/03/2020    ALBUMIN 4.0 11/03/2020    PROTTOTAL 7.7 11/03/2020    ALT 86 (H) 11/03/2020    AST 32 11/03/2020    ALKPHOS 76 11/03/2020    BILITOTAL 0.5 11/03/2020    TSH 2.50 04/22/2014        Preop Vitals    BP Readings from Last 3 Encounters:   11/23/20 129/81   11/06/20 121/74   10/12/20 120/78    Pulse Readings from Last 3 Encounters:   11/23/20 87   11/06/20 83   09/03/20 84      Resp Readings from Last 3 Encounters:   11/23/20 16   11/06/20 16   09/03/20 18    SpO2 Readings from Last 3 Encounters:   11/06/20 99%   09/03/20 99%   08/30/20 99%      Temp Readings from Last 1 Encounters:   11/06/20 97.8  F (36.6  C) (Oral)    Ht Readings from Last 1 Encounters:   12/16/20 1.702 m (5' 7\")      Wt Readings from Last 1 Encounters:   12/16/20 76.2 kg (168 lb)    Estimated body mass index is 26.31 kg/m  as calculated from the following:    Height as of this encounter: 1.702 m (5' 7\").    Weight as of this encounter: 76.2 kg (168 lb).     ROS/MED HX  The complete review of systems is negative other than noted in the HPI or here.   ENT/Pulmonary:     (+)tobacco use, Past use , . .    Neurologic:     (+)other neuro acquired hydrocephalus    Cardiovascular:  - neg cardiovascular ROS   (+) ----. : . . . :. . Previous cardiac testing date:results:date: results:ECG reviewed date:2017 results:SR date: results:          METS/Exercise Tolerance:  >4 METS   Hematologic:  - neg hematologic  ROS       Musculoskeletal:  - neg musculoskeletal ROS       GI/Hepatic:  - neg GI/hepatic ROS       Renal/Genitourinary:  - ROS Renal section negative       Endo:  - neg endo ROS       Psychiatric:     (+) psychiatric history anxiety      Infectious Disease:  - neg infectious disease ROS       Malignancy:      - no malignancy   Other:    (+) No chance of pregnancy " "C-spine cleared: N/A, H/O Chronic Pain,no other significant disability              PHYSICAL EXAM:   Mental Status/Neuro: A/A/O; Age Appropriate   Airway: Facies: Feasible  Mallampati: Not Assessed  Mouth/Opening: Not Assessed  TM distance: Not Assessed  Neck ROM: Not Assessed   Respiratory:    CV:    Comments: Virtual visit     Dental: Normal Dentition               168 lbs 0 oz  5' 7\"   Body mass index is 26.31 kg/m .       Physical Exam  Constitutional: Awake, alert, no apparent distress, and appears stated age.  Respiratory: No cough or obvious dyspnea.  Neuropsychiatric: Calm, cooperative. Normal affect.  Please refer to the physical examination documented by the anesthesiologist in the anesthesia record on the day of surgery      EKG: Personally reviewed 2017 Sinus rhythm    11/25/20 MRI Brain                                                               IMPRESSION: Simple pineal cyst, which narrows the entrance to the  cerebral aqueduct to less than 1 mm. While there is no donald  cardiomegaly at this time, if this cyst were to enlarge, rapid  hydrocephalus can develop. Consider continued MRI follow-up with  similar high-resolution sequences.      11/19/20 MR Cervical spine  IMPRESSION: Unremarkable cervical spine MRI.    Imaging reviewed by this provider      ASSESSMENT and PLAN  Balwinder Perez is a 35 year old male scheduled to undergo stealth assisted fenstration of cyst, possible ventriculostomy, Possible Ventriculoperitoneal Shunt Insertion with Dr. Graves on 12/23/20. He has the following specific operative considerations:   - RCRI : No serious cardiac risks.   - Anesthesia considerations:  Refer to PAC assessment in anesthesia records  - VTE risk: 0.5%  - WALKER # of risks 1/8 = Low risk  - Risk of PONV score = 2.  If > 2, anti-emetic intervention recommended.    --Acquired obstructive hydrocephalus, cyst of pineal gland. Significant symptoms Above procedure now planned. Occasional Oxycodone.   --Generally " healthy with no significant cardiopulmonary history. Former smoker. Good activity tolerance.   --Anxiety. Sertraline at HS.   --Denies history of blood transfusion.     Arrival time, NPO, shower and medication instructions provided by nursing staff today.     Patient is optimized and is acceptable candidate for the proposed procedure.  No further diagnostic evaluation is needed.       KEY Ellis CNS  Preoperative Assessment Center  [facility]  Clinic and Surgery Center  Phone: [phone]  Fax: [phone]

## 2020-12-16 NOTE — PROGRESS NOTES
"Balwinder Perez is a 35 year old male who is being evaluated via a billable video visit.      The patient has been notified of following:     \"This video visit will be conducted via a call between you and your physician/provider. We have found that certain health care needs can be provided without the need for an in-person physical exam.  This service lets us provide the care you need with a video conversation.  If a prescription is necessary we can send it directly to your pharmacy.  If lab work is needed we can place an order for that and you can then stop by our lab to have the test done at a later time.    Video visits are billed at different rates depending on your insurance coverage.  Please reach out to your insurance provider with any questions.    If during the course of the call the physician/provider feels a video visit is not appropriate, you will not be charged for this service.\"    Patient has given verbal consent for Video visit? Yes  How would you like to obtain your AVS? Alexsandrahart  If you are dropped from the video visit, the video invite should be resent to: Other e-mail: Dennis  Will anyone else be joining your video visit? No      Otis Hunter        "

## 2020-12-16 NOTE — ANESTHESIA PREPROCEDURE EVALUATION
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]   Procedure(s):  stealth assisted fenstration of cyst, possible ventriculostomy,  Possible Ventriculoperitoneal Shunt Insertion     LABS:  CBC:   Lab Results   Component Value Date    WBC 7.1 2014    HGB 15.6 2014    HGB 15.6 2014    HCT 46.5 2014     2014     BMP:   Lab Results   Component Value Date     2020    POTASSIUM 4.0 2020    CHLORIDE 106 2020    CO2 31 2020    BUN 11 2020    CR 0.87 2020    CR 0.79 2014    GLC 86 2020    GLC 82 2014     COAGS: No results found for: PTT, INR, FIBR  POC: No results found for: BGM, HCG, HCGS  OTHER:   Lab Results   Component Value Date    KELLY 9.4 2020    ALBUMIN 4.0 2020    PROTTOTAL 7.7 2020    ALT 86 (H) 2020    AST 32 2020    ALKPHOS 76 2020    BILITOTAL 0.5 2020    TSH 2.50 2014        Preop Vitals    BP Readings from Last 3 Encounters:   20 129/81   20 121/74   10/12/20 120/78    Pulse Readings from Last 3 Encounters:   20 87   20 83   20 84      Resp Readings from Last 3 Encounters:   20 16   20 16   20 18    SpO2 Readings from Last 3 Encounters:   20 99%   20 99%   20 99%      Temp Readings from Last 1 Encounters:   20 97.8  F (36.6  C) (Oral)    Ht Readings from Last 1 Encounters:   20 1.702 m (5' 7\")      Wt Readings from Last 1 Encounters:   12/16/20 76.2 kg (168 lb)    Estimated body mass index is 26.31 kg/m  as calculated from the following:    Height as of this encounter: 1.702 m (5' 7\").    Weight as of this encounter: 76.2 kg (168 lb).     LDA:        Past Medical History:   Diagnosis Date     " Acquired obstructive hydrocephalus (H) 12/01/2020     CARDIOVASCULAR SCREENING; LDL GOAL LESS THAN 160 01/25/2013     Cyst of pineal gland 12/01/2020      Past Surgical History:   Procedure Laterality Date     [place] teeth extraction        No Known Allergies     Anesthesia Evaluation     . Pt has had prior anesthetic. Type: MAC    No history of anesthetic complications          ROS/MED HX    ENT/Pulmonary:     (+)tobacco use, Past use , . .    Neurologic:     (+)other neuro acquired hydrocephalus    Cardiovascular:  - neg cardiovascular ROS   (+) ----. : . . . :. . Previous cardiac testing date:results:date: results:ECG reviewed date:2017 results:SR date: results:          METS/Exercise Tolerance:  >4 METS   Hematologic:  - neg hematologic  ROS       Musculoskeletal:  - neg musculoskeletal ROS       GI/Hepatic:  - neg GI/hepatic ROS       Renal/Genitourinary:  - ROS Renal section negative       Endo:  - neg endo ROS       Psychiatric:     (+) psychiatric history anxiety      Infectious Disease:  - neg infectious disease ROS       Malignancy:      - no malignancy   Other:    (+) No chance of pregnancy C-spine cleared: N/A, H/O Chronic Pain,no other significant disability                        PHYSICAL EXAM:   Mental Status/Neuro: A/A/O; Age Appropriate   Airway: Facies: Feasible  Mallampati: II  Mouth/Opening: Full  TM distance: > 6 cm  Neck ROM: Full   Respiratory:   Resp. Effort: Normal      CV:   Edema: None   Comments: Virtual visit     Dental: Normal Dentition                Assessment:   ASA SCORE: 2    H&P: History and physical reviewed and following examination; no interval change.   Smoking Status:  Non-Smoker/Unknown   NPO Status: Will be NPO Appropriate at ... 12/23/2020 12:00 AM     Plan:   Anes. Type:  General   Pre-Medication: Acetaminophen   Induction:  IV (Standard)   Airway: ETT; Oral   Access/Monitoring: PIV; 2nd PIV; A-Line   Maintenance: Balanced     Postop Plan:   Postop Pain: Opioids  Postop  Sedation/Airway: Not planned  Disposition: Inpatient/Admit     PONV Management:   Adult Risk Factors:, Non-Smoker, Postop Opioids   Prevention: Ondansetron     CONSENT: Direct conversation   Plan and risks discussed with: Patient   Blood Products: Consented (ALL Blood Products)                PAC Discussion and Assessment    ASA Classification: 2  Case is suitable for: [place]  Anesthetic techniques and relevant risks discussed: GA  Invasive monitoring and risk discussed: No  Types:   Possibility and Risk of blood transfusion discussed: No  NPO instructions given:   Additional anesthetic preparation and risks discussed:   Needs early admission to pre-op area:   Other:     PAC Resident/NP Anesthesia Assessment:  Balwinder Perez is a 35 year old male scheduled to undergo stealth assisted fenstration of cyst, possible ventriculostomy, Possible Ventriculoperitoneal Shunt Insertion with Dr. Graves on 12/23/20. He has the following specific operative considerations:   - RCRI : No serious cardiac risks.   - VTE risk: 0.5%  - WALKER # of risks 1/8 = Low risk  - Risk of PONV score = 2.  If > 2, anti-emetic intervention recommended.    No GA history.    --Acquired obstructive hydrocephalus, cyst of pineal gland. Significant symptoms Above procedure now planned. Occasional Oxycodone.   --Generally healthy with no significant cardiopulmonary history. Former smoker. Good activity tolerance.   --Anxiety. Sertraline at HS.   --Denies history of blood transfusion.       Patient is optimized and is acceptable candidate for the proposed procedure.  No further diagnostic evaluation is needed.         Reviewed and Signed by PAC Mid-Level Provider/Resident  Mid-Level Provider/Resident: KEY Hickman, CNS  Date: 12/16/20  Time: 11:38am    Attending Anesthesiologist Anesthesia Assessment:        Anesthesiologist:   Date:   Time:   Pass/Fail:   Disposition:     PAC Pharmacist Assessment:        Pharmacist:   Date:   Time:    Floridalma Rubin  PEACE McnallyN CNS

## 2020-12-16 NOTE — PATIENT INSTRUCTIONS
Preparing for Your Surgery      Name:  Balwinder Perez   MRN:  [id number]   :  1985   Today's Date:  12/15/2020       Arriving for surgery:  Surgery date:  20  Arrival time:  05:45 am    Restrictions due to COVID 19:  No visitors are allowed at this time.    [employer] parking is available for anyone with mobility limitations or disabilities.  ([place]  24 hours/ 7 days a week; [facility]  7 am- 3:30 pm, Mon- Fri)    Please come to:   [facility], [place] [address]  [address] [id number]  -    Please proceed to the Surgery Lounge on the 3rd floor. [phone]?     - ?If you are in need of directions, wheelchair or escort please stop at the Information Desk in the lobby.  Inform the information person that you are here for surgery; a wheelchair and escort will be provided to the Surgery Lounge .?     What can I eat or drink?  -  You may eat and drink normally for up to 8 hours before your surgery.   -  You may have clear liquids until 2 hours before surgery.    Examples of clear liquids:  Water  Clear broth  Juices (apple, white grape, white cranberry  and cider) without pulp  Noncarbonated, powder based beverages  (lemonade and Klever-Aid)  Sodas (Sprite, 7-Up, ginger ale and seltzer)  Coffee or tea (without milk or cream)  Gatorade    -  No Alcohol for at least 24 hours before surgery     Which medicines can I take?    Hold Aspirin for 7 days before surgery.   Hold Multivitamins for 7 days before surgery.  Hold Supplements for 7 days before surgery.  Hold Ibuprofen (Advil, Motrin) for 1 day before surgery--unless otherwise directed by surgeon.  Hold Naproxen (Aleve) for 4 days before surgery.  -  PLEASE TAKE these medications the day of surgery:  Tylenol if needed; take all scheduled morning medications.    How do I prepare myself?  - Please take 2 showers before surgery using Scrubcare or Hibiclens soap.    Use this soap only from the neck to your toes.     Leave the  soap on your skin for one minute--then rinse thoroughly.      You may use your own shampoo and conditioner; no other hair products.   - Please remove all jewelry and body piercings.  - No lotions, deodorants or fragrance.  - No makeup or fingernail polish.   - Bring your ID and insurance card.    - All patients are required to have a Covid-19 test within 4 days of surgery/procedure.      -Patients will be contacted by the [facility] scheduling team within 1 week of surgery to make an appointment.      - Patients may call the Scheduling team at [phone] if they have not been scheduled within 4 days of  surgery.    Questions or Concerns:    - For any questions regarding the day of surgery or your hospital stay, please contact the Pre Admission Nursing Office at [phone].       - If you have health changes between today and your surgery please call your surgeon.       For questions after surgery please call your surgeons office.

## 2020-12-21 DIAGNOSIS — Z11.59 ENCOUNTER FOR SCREENING FOR OTHER VIRAL DISEASES: ICD-10-CM

## 2020-12-21 LAB
SARS-COV-2 RNA SPEC QL NAA+PROBE: NORMAL
SPECIMEN SOURCE: NORMAL

## 2020-12-21 PROCEDURE — U0003 INFECTIOUS AGENT DETECTION BY NUCLEIC ACID (DNA OR RNA); SEVERE ACUTE RESPIRATORY SYNDROME CORONAVIRUS 2 (SARS-COV-2) (CORONAVIRUS DISEASE [COVID-19]), AMPLIFIED PROBE TECHNIQUE, MAKING USE OF HIGH THROUGHPUT TECHNOLOGIES AS DESCRIBED BY CMS-2020-01-R: HCPCS | Performed by: NEUROLOGICAL SURGERY

## 2020-12-22 LAB
LABORATORY COMMENT REPORT: NORMAL
SARS-COV-2 RNA SPEC QL NAA+PROBE: NEGATIVE
SPECIMEN SOURCE: NORMAL

## 2020-12-23 ENCOUNTER — APPOINTMENT (OUTPATIENT)
Dept: CT IMAGING | Facility: CLINIC | Age: 35
DRG: 027 | End: 2020-12-23
Attending: NURSE PRACTITIONER
Payer: COMMERCIAL

## 2020-12-23 ENCOUNTER — HOSPITAL ENCOUNTER (INPATIENT)
Facility: CLINIC | Age: 35
LOS: 2 days | Discharge: HOME | DRG: 027 | End: 2020-12-25
Attending: NEUROLOGICAL SURGERY | Admitting: NEUROLOGICAL SURGERY
Payer: COMMERCIAL

## 2020-12-23 ENCOUNTER — ANESTHESIA (OUTPATIENT)
Dept: SURGERY | Facility: CLINIC | Age: 35
DRG: 027 | End: 2020-12-23
Payer: COMMERCIAL

## 2020-12-23 ENCOUNTER — APPOINTMENT (OUTPATIENT)
Dept: CT IMAGING | Facility: CLINIC | Age: 35
DRG: 027 | End: 2020-12-23
Attending: NEUROLOGICAL SURGERY
Payer: COMMERCIAL

## 2020-12-23 DIAGNOSIS — G91.1 ACQUIRED OBSTRUCTIVE HYDROCEPHALUS (H): ICD-10-CM

## 2020-12-23 DIAGNOSIS — E34.8 CYST OF PINEAL GLAND: ICD-10-CM

## 2020-12-23 LAB
AFP SERPL-MCNC: <1.5 UG/L (ref 0–8)
ANION GAP SERPL CALCULATED.3IONS-SCNC: 6 MMOL/L (ref 3–14)
APTT PPP: 30 SEC (ref 22–37)
BASOPHILS # BLD AUTO: 0 10E9/L (ref 0–0.2)
BASOPHILS NFR BLD AUTO: 0.4 %
BUN SERPL-MCNC: 11 MG/DL (ref 7–30)
CALCIUM SERPL-MCNC: 9.7 MG/DL (ref 8.5–10.1)
CHLORIDE SERPL-SCNC: 107 MMOL/L (ref 94–109)
CO2 SERPL-SCNC: 27 MMOL/L (ref 20–32)
CREAT SERPL-MCNC: 0.98 MG/DL (ref 0.66–1.25)
DIFFERENTIAL METHOD BLD: ABNORMAL
EOSINOPHIL # BLD AUTO: 0.1 10E9/L (ref 0–0.7)
EOSINOPHIL NFR BLD AUTO: 1 %
ERYTHROCYTE [DISTWIDTH] IN BLOOD BY AUTOMATED COUNT: 13.6 % (ref 10–15)
GFR SERPL CREATININE-BSD FRML MDRD: >90 ML/MIN/{1.73_M2}
GLUCOSE BLDC GLUCOMTR-MCNC: 106 MG/DL (ref 70–99)
GLUCOSE SERPL-MCNC: 109 MG/DL (ref 70–99)
HCT VFR BLD AUTO: 47.9 % (ref 40–53)
HGB BLD-MCNC: 15.7 G/DL (ref 13.3–17.7)
IMM GRANULOCYTES # BLD: 0 10E9/L (ref 0–0.4)
IMM GRANULOCYTES NFR BLD: 0.6 %
INR PPP: 1.02 (ref 0.86–1.14)
LYMPHOCYTES # BLD AUTO: 2.3 10E9/L (ref 0.8–5.3)
LYMPHOCYTES NFR BLD AUTO: 32.3 %
MCH RBC QN AUTO: 24.9 PG (ref 26.5–33)
MCHC RBC AUTO-ENTMCNC: 32.8 G/DL (ref 31.5–36.5)
MCV RBC AUTO: 76 FL (ref 78–100)
MONOCYTES # BLD AUTO: 0.6 10E9/L (ref 0–1.3)
MONOCYTES NFR BLD AUTO: 8.7 %
NEUTROPHILS # BLD AUTO: 4.1 10E9/L (ref 1.6–8.3)
NEUTROPHILS NFR BLD AUTO: 57 %
NRBC # BLD AUTO: 0 10*3/UL
NRBC BLD AUTO-RTO: 0 /100
PLATELET # BLD AUTO: 209 10E9/L (ref 150–450)
POTASSIUM SERPL-SCNC: 3.7 MMOL/L (ref 3.4–5.3)
RBC # BLD AUTO: 6.31 10E12/L (ref 4.4–5.9)
SODIUM SERPL-SCNC: 140 MMOL/L (ref 133–144)
WBC # BLD AUTO: 7.1 10E9/L (ref 4–11)

## 2020-12-23 PROCEDURE — 80048 BASIC METABOLIC PNL TOTAL CA: CPT | Performed by: NEUROLOGICAL SURGERY

## 2020-12-23 PROCEDURE — 88307 TISSUE EXAM BY PATHOLOGIST: CPT | Mod: TC | Performed by: NEUROLOGICAL SURGERY

## 2020-12-23 PROCEDURE — 62201 BRAIN CAVITY SHUNT W/SCOPE: CPT | Mod: GC | Performed by: NEUROLOGICAL SURGERY

## 2020-12-23 PROCEDURE — 88307 TISSUE EXAM BY PATHOLOGIST: CPT | Mod: 26 | Performed by: SPECIALIST

## 2020-12-23 PROCEDURE — 258N000003 HC RX IP 258 OP 636: Performed by: STUDENT IN AN ORGANIZED HEALTH CARE EDUCATION/TRAINING PROGRAM

## 2020-12-23 PROCEDURE — 250N000011 HC RX IP 250 OP 636: Performed by: STUDENT IN AN ORGANIZED HEALTH CARE EDUCATION/TRAINING PROGRAM

## 2020-12-23 PROCEDURE — 370N000002 HC ANESTHESIA TECHNICAL FEE, EACH ADDTL 15 MIN: Performed by: NEUROLOGICAL SURGERY

## 2020-12-23 PROCEDURE — 85025 COMPLETE CBC W/AUTO DIFF WBC: CPT | Performed by: NEUROLOGICAL SURGERY

## 2020-12-23 PROCEDURE — 84702 CHORIONIC GONADOTROPIN TEST: CPT | Performed by: NEUROLOGICAL SURGERY

## 2020-12-23 PROCEDURE — 761N000004 HC RECOVERY PHASE 1 LEVEL 2 EA ADDTL HR: Performed by: NEUROLOGICAL SURGERY

## 2020-12-23 PROCEDURE — 370N000001 HC ANESTHESIA TECHNICAL FEE, 1ST 30 MIN: Performed by: NEUROLOGICAL SURGERY

## 2020-12-23 PROCEDURE — 360N000034 HC SURGERY LEVEL 5 1ST 30 MIN - UMMC: Performed by: NEUROLOGICAL SURGERY

## 2020-12-23 PROCEDURE — 272N000001 HC OR GENERAL SUPPLY STERILE: Performed by: NEUROLOGICAL SURGERY

## 2020-12-23 PROCEDURE — 250N000009 HC RX 250

## 2020-12-23 PROCEDURE — 250N000003 HC SEVOFLURANE, EA 15 MIN: Performed by: NEUROLOGICAL SURGERY

## 2020-12-23 PROCEDURE — C1713 ANCHOR/SCREW BN/BN,TIS/BN: HCPCS | Performed by: NEUROLOGICAL SURGERY

## 2020-12-23 PROCEDURE — C1726 CATH, BAL DIL, NON-VASCULAR: HCPCS | Performed by: NEUROLOGICAL SURGERY

## 2020-12-23 PROCEDURE — 258N000003 HC RX IP 258 OP 636

## 2020-12-23 PROCEDURE — 360N000035 HC SURGERY LEVEL 5 EA 15 ADDTL MIN - UMMC: Performed by: NEUROLOGICAL SURGERY

## 2020-12-23 PROCEDURE — 250N000013 HC RX MED GY IP 250 OP 250 PS 637: Performed by: STUDENT IN AN ORGANIZED HEALTH CARE EDUCATION/TRAINING PROGRAM

## 2020-12-23 PROCEDURE — 70450 CT HEAD/BRAIN W/O DYE: CPT | Mod: 26 | Performed by: RADIOLOGY

## 2020-12-23 PROCEDURE — 36415 COLL VENOUS BLD VENIPUNCTURE: CPT | Performed by: NEUROLOGICAL SURGERY

## 2020-12-23 PROCEDURE — 00764ZZ DILATION OF CEREBRAL VENTRICLE, PERCUTANEOUS ENDOSCOPIC APPROACH: ICD-10-PCS | Performed by: NEUROLOGICAL SURGERY

## 2020-12-23 PROCEDURE — 250N000011 HC RX IP 250 OP 636

## 2020-12-23 PROCEDURE — 120N000002 HC R&B MED SURG/OB UMMC

## 2020-12-23 PROCEDURE — 0GB14ZX EXCISION OF PINEAL BODY, PERCUTANEOUS ENDOSCOPIC APPROACH, DIAGNOSTIC: ICD-10-PCS | Performed by: NEUROLOGICAL SURGERY

## 2020-12-23 PROCEDURE — 85730 THROMBOPLASTIN TIME PARTIAL: CPT | Performed by: NEUROLOGICAL SURGERY

## 2020-12-23 PROCEDURE — 250N000009 HC RX 250: Performed by: NEUROLOGICAL SURGERY

## 2020-12-23 PROCEDURE — 8E09XBZ COMPUTER ASSISTED PROCEDURE OF HEAD AND NECK REGION: ICD-10-PCS | Performed by: NEUROLOGICAL SURGERY

## 2020-12-23 PROCEDURE — 85610 PROTHROMBIN TIME: CPT | Performed by: NEUROLOGICAL SURGERY

## 2020-12-23 PROCEDURE — 999N000139 HC STATISTIC PRE-PROCEDURE ASSESSMENT II: Performed by: NEUROLOGICAL SURGERY

## 2020-12-23 PROCEDURE — 250N000013 HC RX MED GY IP 250 OP 250 PS 637

## 2020-12-23 PROCEDURE — 761N000003 HC RECOVERY PHASE 1 LEVEL 2 FIRST HR: Performed by: NEUROLOGICAL SURGERY

## 2020-12-23 PROCEDURE — 999N001017 HC STATISTIC GLUCOSE BY METER IP

## 2020-12-23 PROCEDURE — 250N000011 HC RX IP 250 OP 636: Performed by: NURSE PRACTITIONER

## 2020-12-23 PROCEDURE — 70450 CT HEAD/BRAIN W/O DYE: CPT | Mod: 77

## 2020-12-23 PROCEDURE — 82105 ALPHA-FETOPROTEIN SERUM: CPT | Performed by: NEUROLOGICAL SURGERY

## 2020-12-23 PROCEDURE — 250N000013 HC RX MED GY IP 250 OP 250 PS 637: Performed by: NURSE PRACTITIONER

## 2020-12-23 PROCEDURE — 272N000002 HC OR SUPPLY OTHER OPNP: Performed by: NEUROLOGICAL SURGERY

## 2020-12-23 PROCEDURE — 278N000051 HC OR IMPLANT GENERAL: Performed by: NEUROLOGICAL SURGERY

## 2020-12-23 PROCEDURE — 70450 CT HEAD/BRAIN W/O DYE: CPT

## 2020-12-23 DEVICE — IMP SCR SYN MATRIX LOW PRO 1.5X04MM SELF DRILL 04.503.104.01: Type: IMPLANTABLE DEVICE | Site: CRANIAL | Status: FUNCTIONAL

## 2020-12-23 DEVICE — IMP BUR HOLE COVER FOR SHUNT 17MM LOW PROFILE TI 421.554: Type: IMPLANTABLE DEVICE | Site: CRANIAL | Status: FUNCTIONAL

## 2020-12-23 DEVICE — IMP BUR HOLE COVER 17MM LOW PROFILE TI 421.527: Type: IMPLANTABLE DEVICE | Site: CRANIAL | Status: FUNCTIONAL

## 2020-12-23 RX ORDER — NALOXONE HYDROCHLORIDE 0.4 MG/ML
0.4 INJECTION, SOLUTION INTRAMUSCULAR; INTRAVENOUS; SUBCUTANEOUS
Status: DISCONTINUED | OUTPATIENT
Start: 2020-12-23 | End: 2020-12-25 | Stop reason: HOSPADM

## 2020-12-23 RX ORDER — AMOXICILLIN 250 MG
1 CAPSULE ORAL 2 TIMES DAILY
Status: DISCONTINUED | OUTPATIENT
Start: 2020-12-23 | End: 2020-12-25 | Stop reason: HOSPADM

## 2020-12-23 RX ORDER — SODIUM CHLORIDE 9 MG/ML
INJECTION, SOLUTION INTRAVENOUS CONTINUOUS
Status: DISCONTINUED | OUTPATIENT
Start: 2020-12-23 | End: 2020-12-25 | Stop reason: HOSPADM

## 2020-12-23 RX ORDER — ONDANSETRON 2 MG/ML
4 INJECTION INTRAMUSCULAR; INTRAVENOUS EVERY 6 HOURS PRN
Status: DISCONTINUED | OUTPATIENT
Start: 2020-12-23 | End: 2020-12-25 | Stop reason: HOSPADM

## 2020-12-23 RX ORDER — CEFAZOLIN SODIUM 1 G/3ML
1 INJECTION, POWDER, FOR SOLUTION INTRAMUSCULAR; INTRAVENOUS SEE ADMIN INSTRUCTIONS
Status: DISCONTINUED | OUTPATIENT
Start: 2020-12-23 | End: 2020-12-23 | Stop reason: HOSPADM

## 2020-12-23 RX ORDER — PROCHLORPERAZINE MALEATE 10 MG
10 TABLET ORAL EVERY 6 HOURS PRN
Status: DISCONTINUED | OUTPATIENT
Start: 2020-12-23 | End: 2020-12-25 | Stop reason: HOSPADM

## 2020-12-23 RX ORDER — LIDOCAINE 40 MG/G
CREAM TOPICAL
Status: DISCONTINUED | OUTPATIENT
Start: 2020-12-23 | End: 2020-12-23 | Stop reason: HOSPADM

## 2020-12-23 RX ORDER — AMOXICILLIN 250 MG
2 CAPSULE ORAL 2 TIMES DAILY
Status: DISCONTINUED | OUTPATIENT
Start: 2020-12-23 | End: 2020-12-25 | Stop reason: HOSPADM

## 2020-12-23 RX ORDER — ACETAMINOPHEN 325 MG/1
975 TABLET ORAL ONCE
Status: COMPLETED | OUTPATIENT
Start: 2020-12-23 | End: 2020-12-23

## 2020-12-23 RX ORDER — HYDROMORPHONE HYDROCHLORIDE 1 MG/ML
.3-.5 INJECTION, SOLUTION INTRAMUSCULAR; INTRAVENOUS; SUBCUTANEOUS EVERY 5 MIN PRN
Status: DISCONTINUED | OUTPATIENT
Start: 2020-12-23 | End: 2020-12-23 | Stop reason: HOSPADM

## 2020-12-23 RX ORDER — BUPIVACAINE HYDROCHLORIDE AND EPINEPHRINE 5; 5 MG/ML; UG/ML
INJECTION, SOLUTION PERINEURAL PRN
Status: DISCONTINUED | OUTPATIENT
Start: 2020-12-23 | End: 2020-12-23 | Stop reason: HOSPADM

## 2020-12-23 RX ORDER — DIPHENHYDRAMINE HCL 25 MG
25 CAPSULE ORAL EVERY 6 HOURS PRN
Status: DISCONTINUED | OUTPATIENT
Start: 2020-12-23 | End: 2020-12-25 | Stop reason: HOSPADM

## 2020-12-23 RX ORDER — HYDROMORPHONE HYDROCHLORIDE 1 MG/ML
.3-.5 INJECTION, SOLUTION INTRAMUSCULAR; INTRAVENOUS; SUBCUTANEOUS
Status: DISCONTINUED | OUTPATIENT
Start: 2020-12-23 | End: 2020-12-24

## 2020-12-23 RX ORDER — PROPOFOL 10 MG/ML
INJECTION, EMULSION INTRAVENOUS PRN
Status: DISCONTINUED | OUTPATIENT
Start: 2020-12-23 | End: 2020-12-23

## 2020-12-23 RX ORDER — OXYCODONE HYDROCHLORIDE 5 MG/1
5-10 TABLET ORAL
Status: DISCONTINUED | OUTPATIENT
Start: 2020-12-23 | End: 2020-12-25 | Stop reason: HOSPADM

## 2020-12-23 RX ORDER — NALOXONE HYDROCHLORIDE 0.4 MG/ML
0.2 INJECTION, SOLUTION INTRAMUSCULAR; INTRAVENOUS; SUBCUTANEOUS
Status: DISCONTINUED | OUTPATIENT
Start: 2020-12-23 | End: 2020-12-25 | Stop reason: HOSPADM

## 2020-12-23 RX ORDER — ACETAMINOPHEN 325 MG/1
650 TABLET ORAL EVERY 4 HOURS PRN
Status: DISCONTINUED | OUTPATIENT
Start: 2020-12-26 | End: 2020-12-25 | Stop reason: HOSPADM

## 2020-12-23 RX ORDER — LIDOCAINE 40 MG/G
CREAM TOPICAL
Status: DISCONTINUED | OUTPATIENT
Start: 2020-12-23 | End: 2020-12-25 | Stop reason: HOSPADM

## 2020-12-23 RX ORDER — ONDANSETRON 4 MG/1
4 TABLET, ORALLY DISINTEGRATING ORAL EVERY 30 MIN PRN
Status: DISCONTINUED | OUTPATIENT
Start: 2020-12-23 | End: 2020-12-23 | Stop reason: HOSPADM

## 2020-12-23 RX ORDER — LIDOCAINE HYDROCHLORIDE 20 MG/ML
INJECTION, SOLUTION INFILTRATION; PERINEURAL PRN
Status: DISCONTINUED | OUTPATIENT
Start: 2020-12-23 | End: 2020-12-23

## 2020-12-23 RX ORDER — HYDRALAZINE HYDROCHLORIDE 20 MG/ML
10-20 INJECTION INTRAMUSCULAR; INTRAVENOUS EVERY 30 MIN PRN
Status: DISCONTINUED | OUTPATIENT
Start: 2020-12-23 | End: 2020-12-25 | Stop reason: HOSPADM

## 2020-12-23 RX ORDER — ONDANSETRON 2 MG/ML
INJECTION INTRAMUSCULAR; INTRAVENOUS PRN
Status: DISCONTINUED | OUTPATIENT
Start: 2020-12-23 | End: 2020-12-23

## 2020-12-23 RX ORDER — CYCLOBENZAPRINE HCL 10 MG
TABLET ORAL
COMMUNITY
Start: 2020-09-14 | End: 2021-07-13

## 2020-12-23 RX ORDER — NALOXONE HYDROCHLORIDE 0.4 MG/ML
0.4 INJECTION, SOLUTION INTRAMUSCULAR; INTRAVENOUS; SUBCUTANEOUS
Status: DISCONTINUED | OUTPATIENT
Start: 2020-12-23 | End: 2020-12-23

## 2020-12-23 RX ORDER — LABETALOL HYDROCHLORIDE 5 MG/ML
10-40 INJECTION, SOLUTION INTRAVENOUS EVERY 10 MIN PRN
Status: DISCONTINUED | OUTPATIENT
Start: 2020-12-23 | End: 2020-12-25 | Stop reason: HOSPADM

## 2020-12-23 RX ORDER — ACETAMINOPHEN 325 MG/1
650 TABLET ORAL EVERY 4 HOURS PRN
Status: DISCONTINUED | OUTPATIENT
Start: 2020-12-26 | End: 2020-12-23

## 2020-12-23 RX ORDER — ONDANSETRON 4 MG/1
4 TABLET, ORALLY DISINTEGRATING ORAL EVERY 6 HOURS PRN
Status: DISCONTINUED | OUTPATIENT
Start: 2020-12-23 | End: 2020-12-25 | Stop reason: HOSPADM

## 2020-12-23 RX ORDER — ACETAMINOPHEN 325 MG/1
975 TABLET ORAL EVERY 8 HOURS
Status: DISCONTINUED | OUTPATIENT
Start: 2020-12-23 | End: 2020-12-23

## 2020-12-23 RX ORDER — SODIUM CHLORIDE, SODIUM LACTATE, POTASSIUM CHLORIDE, CALCIUM CHLORIDE 600; 310; 30; 20 MG/100ML; MG/100ML; MG/100ML; MG/100ML
INJECTION, SOLUTION INTRAVENOUS CONTINUOUS
Status: DISCONTINUED | OUTPATIENT
Start: 2020-12-23 | End: 2020-12-23 | Stop reason: HOSPADM

## 2020-12-23 RX ORDER — MULTIPLE VITAMINS W/ MINERALS TAB 9MG-400MCG
1 TAB ORAL DAILY
Status: DISCONTINUED | OUTPATIENT
Start: 2020-12-24 | End: 2020-12-25 | Stop reason: HOSPADM

## 2020-12-23 RX ORDER — ONDANSETRON 2 MG/ML
4 INJECTION INTRAMUSCULAR; INTRAVENOUS EVERY 30 MIN PRN
Status: DISCONTINUED | OUTPATIENT
Start: 2020-12-23 | End: 2020-12-23 | Stop reason: HOSPADM

## 2020-12-23 RX ORDER — NALOXONE HYDROCHLORIDE 0.4 MG/ML
0.2 INJECTION, SOLUTION INTRAMUSCULAR; INTRAVENOUS; SUBCUTANEOUS
Status: DISCONTINUED | OUTPATIENT
Start: 2020-12-23 | End: 2020-12-23

## 2020-12-23 RX ORDER — FENTANYL CITRATE 50 UG/ML
25-50 INJECTION, SOLUTION INTRAMUSCULAR; INTRAVENOUS
Status: DISCONTINUED | OUTPATIENT
Start: 2020-12-23 | End: 2020-12-23 | Stop reason: HOSPADM

## 2020-12-23 RX ORDER — ACETAMINOPHEN 325 MG/1
975 TABLET ORAL EVERY 8 HOURS
Status: DISCONTINUED | OUTPATIENT
Start: 2020-12-23 | End: 2020-12-25 | Stop reason: HOSPADM

## 2020-12-23 RX ORDER — SODIUM CHLORIDE, SODIUM LACTATE, POTASSIUM CHLORIDE, CALCIUM CHLORIDE 600; 310; 30; 20 MG/100ML; MG/100ML; MG/100ML; MG/100ML
INJECTION, SOLUTION INTRAVENOUS CONTINUOUS PRN
Status: DISCONTINUED | OUTPATIENT
Start: 2020-12-23 | End: 2020-12-23

## 2020-12-23 RX ORDER — DIPHENHYDRAMINE HYDROCHLORIDE 50 MG/ML
25 INJECTION INTRAMUSCULAR; INTRAVENOUS EVERY 6 HOURS PRN
Status: DISCONTINUED | OUTPATIENT
Start: 2020-12-23 | End: 2020-12-25 | Stop reason: HOSPADM

## 2020-12-23 RX ORDER — CEFAZOLIN SODIUM 2 G/100ML
2 INJECTION, SOLUTION INTRAVENOUS
Status: COMPLETED | OUTPATIENT
Start: 2020-12-23 | End: 2020-12-23

## 2020-12-23 RX ADMIN — HYDROMORPHONE HYDROCHLORIDE 0.5 MG: 1 INJECTION, SOLUTION INTRAMUSCULAR; INTRAVENOUS; SUBCUTANEOUS at 19:01

## 2020-12-23 RX ADMIN — Medication 1 G: at 10:28

## 2020-12-23 RX ADMIN — OXYCODONE HYDROCHLORIDE 10 MG: 5 TABLET ORAL at 20:15

## 2020-12-23 RX ADMIN — ROCURONIUM BROMIDE 20 MG: 10 INJECTION INTRAVENOUS at 09:08

## 2020-12-23 RX ADMIN — FENTANYL CITRATE 25 MCG: 50 INJECTION, SOLUTION INTRAMUSCULAR; INTRAVENOUS at 12:37

## 2020-12-23 RX ADMIN — Medication 2 G: at 08:30

## 2020-12-23 RX ADMIN — HYDROMORPHONE HYDROCHLORIDE 0.2 MG: 1 INJECTION, SOLUTION INTRAMUSCULAR; INTRAVENOUS; SUBCUTANEOUS at 14:19

## 2020-12-23 RX ADMIN — ROCURONIUM BROMIDE 20 MG: 10 INJECTION INTRAVENOUS at 10:21

## 2020-12-23 RX ADMIN — SERTRALINE HYDROCHLORIDE 50 MG: 50 TABLET ORAL at 21:07

## 2020-12-23 RX ADMIN — HYDROMORPHONE HYDROCHLORIDE 1 MG: 1 INJECTION, SOLUTION INTRAMUSCULAR; INTRAVENOUS; SUBCUTANEOUS at 07:41

## 2020-12-23 RX ADMIN — MIDAZOLAM 2 MG: 1 INJECTION INTRAMUSCULAR; INTRAVENOUS at 07:41

## 2020-12-23 RX ADMIN — SODIUM CHLORIDE: 9 INJECTION, SOLUTION INTRAVENOUS at 12:35

## 2020-12-23 RX ADMIN — ONDANSETRON 4 MG: 2 INJECTION INTRAMUSCULAR; INTRAVENOUS at 10:47

## 2020-12-23 RX ADMIN — LIDOCAINE HYDROCHLORIDE 70 MG: 20 INJECTION, SOLUTION INFILTRATION; PERINEURAL at 08:15

## 2020-12-23 RX ADMIN — Medication 200 MG: at 11:15

## 2020-12-23 RX ADMIN — SODIUM CHLORIDE, POTASSIUM CHLORIDE, SODIUM LACTATE AND CALCIUM CHLORIDE: 600; 310; 30; 20 INJECTION, SOLUTION INTRAVENOUS at 07:46

## 2020-12-23 RX ADMIN — HYDROMORPHONE HYDROCHLORIDE 0.5 MG: 1 INJECTION, SOLUTION INTRAMUSCULAR; INTRAVENOUS; SUBCUTANEOUS at 21:58

## 2020-12-23 RX ADMIN — HYDROMORPHONE HYDROCHLORIDE 0.2 MG: 1 INJECTION, SOLUTION INTRAMUSCULAR; INTRAVENOUS; SUBCUTANEOUS at 13:04

## 2020-12-23 RX ADMIN — PROCHLORPERAZINE EDISYLATE 5 MG: 5 INJECTION INTRAMUSCULAR; INTRAVENOUS at 11:58

## 2020-12-23 RX ADMIN — PROPOFOL 200 MG: 10 INJECTION, EMULSION INTRAVENOUS at 08:15

## 2020-12-23 RX ADMIN — OXYCODONE HYDROCHLORIDE 5 MG: 5 TABLET ORAL at 17:06

## 2020-12-23 RX ADMIN — FENTANYL CITRATE 25 MCG: 50 INJECTION, SOLUTION INTRAMUSCULAR; INTRAVENOUS at 12:05

## 2020-12-23 RX ADMIN — ROCURONIUM BROMIDE 40 MG: 10 INJECTION INTRAVENOUS at 08:20

## 2020-12-23 RX ADMIN — ACETAMINOPHEN 975 MG: 325 TABLET, FILM COATED ORAL at 14:46

## 2020-12-23 RX ADMIN — HYDROMORPHONE HYDROCHLORIDE 0.3 MG: 1 INJECTION, SOLUTION INTRAMUSCULAR; INTRAVENOUS; SUBCUTANEOUS at 12:55

## 2020-12-23 RX ADMIN — Medication 70 MG: at 08:15

## 2020-12-23 RX ADMIN — FENTANYL CITRATE 25 MCG: 50 INJECTION, SOLUTION INTRAMUSCULAR; INTRAVENOUS at 12:24

## 2020-12-23 RX ADMIN — ONDANSETRON 4 MG: 2 INJECTION INTRAMUSCULAR; INTRAVENOUS at 22:53

## 2020-12-23 RX ADMIN — PROPOFOL 50 MG: 10 INJECTION, EMULSION INTRAVENOUS at 08:20

## 2020-12-23 RX ADMIN — ACETAMINOPHEN 975 MG: 325 TABLET, FILM COATED ORAL at 06:49

## 2020-12-23 RX ADMIN — HYDROMORPHONE HYDROCHLORIDE 0.3 MG: 1 INJECTION, SOLUTION INTRAMUSCULAR; INTRAVENOUS; SUBCUTANEOUS at 13:48

## 2020-12-23 RX ADMIN — DOCUSATE SODIUM AND SENNOSIDES 1 TABLET: 8.6; 5 TABLET, FILM COATED ORAL at 20:15

## 2020-12-23 RX ADMIN — SODIUM CHLORIDE, POTASSIUM CHLORIDE, SODIUM LACTATE AND CALCIUM CHLORIDE: 600; 310; 30; 20 INJECTION, SOLUTION INTRAVENOUS at 09:15

## 2020-12-23 RX ADMIN — FENTANYL CITRATE 25 MCG: 50 INJECTION, SOLUTION INTRAMUSCULAR; INTRAVENOUS at 12:08

## 2020-12-23 RX ADMIN — ONDANSETRON 4 MG: 2 INJECTION INTRAMUSCULAR; INTRAVENOUS at 11:45

## 2020-12-23 ASSESSMENT — MIFFLIN-ST. JEOR: SCORE: 1680.5

## 2020-12-23 NOTE — ANESTHESIA PROCEDURE NOTES
Airway   Date/Time: 12/23/2020 8:20 AM   Patient location during procedure: OR    Staff -   Anesthesiologist:  Imani West MD  Resident/Fellow: Mariya Humphrey MD  Performed By: resident    Consent for Airway   Urgency: elective    Indications and Patient Condition  Indications for airway management: emiliana-procedural  Mask difficulty assessment: 1 - vent by mask    Final Airway Details  Final airway type: endotracheal airway  Successful airway:ETT - single  Endotracheal Airway Details   ETT size (mm): 7.5  Cuffed: yes  Cuff volume (mL): 10  Successful intubation technique: direct laryngoscopy  Grade View of Cords: 2  Adjucts: bougie and stylet  Measured from: lips  Secured at (cm): 23  Secured with: pink tape (benzoin)  Bite block used: None    Post intubation assessment   Placement verified by: capnometry, equal breath sounds and chest rise   Number of attempts at approach: 2 (unable to get good view initially, masked for a few breaths and then used a boogie)  Number of other approaches attempted: 0  Secured with:pink tape (benzoin)  Ease of procedure: easy  Dentition: Lips/oral mucosa injury

## 2020-12-23 NOTE — ANESTHESIA PROCEDURE NOTES
Arterial Line Procedure Note      Staff -   Anesthesiologist:  Imani West MD  Resident/Fellow: Mariya Humphrey MD  Performed By: resident  Procedure performed by resident/CRNA in presence of a teaching physician.      Location: In OR After Induction  Procedure Start/Stop Times:     patient identified, IV checked, site marked, risks and benefits discussed, informed consent, monitors and equipment checked, pre-op evaluation and at physician/surgeon's request      Correct Patient: Yes      Correct Position: Yes      Correct Site: Yes      Correct Procedure: Yes      Correct Laterality:  Yes    Site Marked:  Yes  Line Placement:     Procedure:  Arterial Line    Insertion Site:  Radial    Insertion laterality:  Left    Skin Prep: Chloraprep      Patient Prep: mask and sterile gloves      Local skin infiltration:  None    Ultrasound Guided?: No      Catheter size:  20 gauge, 12 cm    Dressing:  Tegaderm    Complications:  None obvious    Arterial waveform: Yes      IBP within 10% of NIBP: Yes

## 2020-12-23 NOTE — BRIEF OP NOTE
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 Endoscopic Third Ventriculostomy performed in tandem.    Skin: 3-0 monocryl

## 2020-12-23 NOTE — OR NURSING
[id number] neurosurgery resident paged. Updated that vitals are stable, neuro exam is intact, but patient feels sense of dizziness that won't go away. Dr. Grant at bedside in PACU to assess. No new orders at this time, continue to monitor.

## 2020-12-23 NOTE — ANESTHESIA CARE TRANSFER NOTE
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    Anesthesia Type:   General     Note:  Airway :Face Mask  Patient transferred to:PACU  Comments: VSS. Breathing spontaneously at a regular rate with adequate tidal volumes and maintaining O2 sats on 6lpm. Denies pain. Complaining of dizziness and nausea in the PACU. 4mg of zofran given in the PACU.    Rahul Humphrey MD  Anesthesia CA-1  [phone]  Handoff Report: Identifed the Patient, Identified the Reponsible Provider, Reviewed the pertinent medical history, Discussed the surgical course, Reviewed Intra-OP anesthesia mangement and issues during anesthesia, Set expectations for post-procedure period and Allowed opportunity for questions and acknowledgement of understanding      Vitals: (Last set prior to Anesthesia Care Transfer)    CRNA VITALS  12/23/2020 1119 - 12/23/2020 1149      12/23/2020             Temp:  (!) 20.2  C (68.4  F)                Electronically Signed By: Mariya Humphrey MD  December 23, 2020  11:49 AM

## 2020-12-24 ENCOUNTER — APPOINTMENT (OUTPATIENT)
Dept: OCCUPATIONAL THERAPY | Facility: CLINIC | Age: 35
DRG: 027 | End: 2020-12-24
Attending: STUDENT IN AN ORGANIZED HEALTH CARE EDUCATION/TRAINING PROGRAM
Payer: COMMERCIAL

## 2020-12-24 LAB
ANION GAP SERPL CALCULATED.3IONS-SCNC: 6 MMOL/L (ref 3–14)
BUN SERPL-MCNC: 7 MG/DL (ref 7–30)
CALCIUM SERPL-MCNC: 8.9 MG/DL (ref 8.5–10.1)
CHLORIDE SERPL-SCNC: 107 MMOL/L (ref 94–109)
CO2 SERPL-SCNC: 26 MMOL/L (ref 20–32)
COPATH REPORT: NORMAL
CREAT SERPL-MCNC: 0.94 MG/DL (ref 0.66–1.25)
ERYTHROCYTE [DISTWIDTH] IN BLOOD BY AUTOMATED COUNT: 13.5 % (ref 10–15)
GFR SERPL CREATININE-BSD FRML MDRD: >90 ML/MIN/{1.73_M2}
GLUCOSE SERPL-MCNC: 115 MG/DL (ref 70–99)
HCG-TM SERPL-ACNC: <3 IU/L
HCT VFR BLD AUTO: 42 % (ref 40–53)
HGB BLD-MCNC: 13.7 G/DL (ref 13.3–17.7)
MCH RBC QN AUTO: 25.2 PG (ref 26.5–33)
MCHC RBC AUTO-ENTMCNC: 32.6 G/DL (ref 31.5–36.5)
MCV RBC AUTO: 77 FL (ref 78–100)
PLATELET # BLD AUTO: 162 10E9/L (ref 150–450)
POTASSIUM SERPL-SCNC: 3.6 MMOL/L (ref 3.4–5.3)
RBC # BLD AUTO: 5.44 10E12/L (ref 4.4–5.9)
SODIUM SERPL-SCNC: 138 MMOL/L (ref 133–144)
WBC # BLD AUTO: 10.2 10E9/L (ref 4–11)

## 2020-12-24 PROCEDURE — 250N000013 HC RX MED GY IP 250 OP 250 PS 637: Performed by: STUDENT IN AN ORGANIZED HEALTH CARE EDUCATION/TRAINING PROGRAM

## 2020-12-24 PROCEDURE — 250N000013 HC RX MED GY IP 250 OP 250 PS 637: Performed by: NURSE PRACTITIONER

## 2020-12-24 PROCEDURE — 97165 OT EVAL LOW COMPLEX 30 MIN: CPT | Mod: GO | Performed by: OCCUPATIONAL THERAPIST

## 2020-12-24 PROCEDURE — 120N000002 HC R&B MED SURG/OB UMMC

## 2020-12-24 PROCEDURE — 250N000011 HC RX IP 250 OP 636: Performed by: STUDENT IN AN ORGANIZED HEALTH CARE EDUCATION/TRAINING PROGRAM

## 2020-12-24 PROCEDURE — 97535 SELF CARE MNGMENT TRAINING: CPT | Mod: GO | Performed by: OCCUPATIONAL THERAPIST

## 2020-12-24 PROCEDURE — 80048 BASIC METABOLIC PNL TOTAL CA: CPT | Performed by: NEUROLOGICAL SURGERY

## 2020-12-24 PROCEDURE — 97530 THERAPEUTIC ACTIVITIES: CPT | Mod: GO | Performed by: OCCUPATIONAL THERAPIST

## 2020-12-24 PROCEDURE — 36415 COLL VENOUS BLD VENIPUNCTURE: CPT | Performed by: NEUROLOGICAL SURGERY

## 2020-12-24 PROCEDURE — 85027 COMPLETE CBC AUTOMATED: CPT | Performed by: NEUROLOGICAL SURGERY

## 2020-12-24 PROCEDURE — 999N000147 HC STATISTIC PT IP EVAL DEFER: Performed by: PHYSICAL THERAPIST

## 2020-12-24 PROCEDURE — 250N000011 HC RX IP 250 OP 636: Performed by: NURSE PRACTITIONER

## 2020-12-24 RX ORDER — OXYCODONE HYDROCHLORIDE 5 MG/1
5-10 TABLET ORAL
Qty: 20 TABLET | Refills: 0 | Status: SHIPPED | OUTPATIENT
Start: 2020-12-24 | End: 2021-03-03

## 2020-12-24 RX ADMIN — OXYCODONE HYDROCHLORIDE 10 MG: 5 TABLET ORAL at 00:00

## 2020-12-24 RX ADMIN — SERTRALINE HYDROCHLORIDE 50 MG: 50 TABLET ORAL at 20:11

## 2020-12-24 RX ADMIN — OXYCODONE HYDROCHLORIDE 5 MG: 5 TABLET ORAL at 11:46

## 2020-12-24 RX ADMIN — ACETAMINOPHEN 975 MG: 325 TABLET, FILM COATED ORAL at 22:32

## 2020-12-24 RX ADMIN — ACETAMINOPHEN 975 MG: 325 TABLET, FILM COATED ORAL at 15:05

## 2020-12-24 RX ADMIN — MULTIPLE VITAMINS W/ MINERALS TAB 1 TABLET: TAB at 08:23

## 2020-12-24 RX ADMIN — ACETAMINOPHEN 975 MG: 325 TABLET, FILM COATED ORAL at 06:33

## 2020-12-24 RX ADMIN — DOCUSATE SODIUM AND SENNOSIDES 1 TABLET: 8.6; 5 TABLET, FILM COATED ORAL at 20:11

## 2020-12-24 RX ADMIN — ACETAMINOPHEN 975 MG: 325 TABLET, FILM COATED ORAL at 00:00

## 2020-12-24 RX ADMIN — OXYCODONE HYDROCHLORIDE 10 MG: 5 TABLET ORAL at 21:01

## 2020-12-24 RX ADMIN — HYDROMORPHONE HYDROCHLORIDE 0.5 MG: 1 INJECTION, SOLUTION INTRAMUSCULAR; INTRAVENOUS; SUBCUTANEOUS at 15:02

## 2020-12-24 RX ADMIN — HYDROMORPHONE HYDROCHLORIDE 0.5 MG: 1 INJECTION, SOLUTION INTRAMUSCULAR; INTRAVENOUS; SUBCUTANEOUS at 02:07

## 2020-12-24 RX ADMIN — PROCHLORPERAZINE EDISYLATE 10 MG: 5 INJECTION INTRAMUSCULAR; INTRAVENOUS at 02:06

## 2020-12-24 ASSESSMENT — ACTIVITIES OF DAILY LIVING (ADL)
PREVIOUS_RESPONSIBILITIES: MEAL PREP;HOUSEKEEPING;LAUNDRY;SHOPPING;MEDICATION MANAGEMENT;FINANCES;DRIVING
ADLS_ACUITY_SCORE: 16

## 2020-12-24 ASSESSMENT — VISUAL ACUITY
OU: NORMAL ACUITY

## 2020-12-24 NOTE — PROGRESS NOTES
12/24/20 1000   Quick Adds   Type of Visit Initial Occupational Therapy Evaluation   Living Environment   Current Living Arrangements house   Home Accessibility no concerns   Transportation Anticipated family or friend will provide   Self-Care   Usual Activity Tolerance good   Current Activity Tolerance good   Regular Exercise No   Equipment Currently Used at Home none   Disability/Function   Hearing Difficulty or Deaf no   Wear Glasses or Blind no   Concentrating, Remembering or Making Decisions Difficulty no   Difficulty Communicating no   Difficulty Eating/Swallowing no   Walking or Climbing Stairs Difficulty no   Dressing/Bathing Difficulty no   Toileting issues no   Doing Errands Independently Difficulty (such as shopping) no   Fall history within last six months no   Change in Functional Status Since Onset of Current Illness/Injury yes   General Information   Referring Physician Marisela Grant   Patient/Family Therapy Goal Statement (OT) return home to [facility]   Additional Occupational Profile Info/Pertinent History of Current Problem Balwinder Perez is a 35 year old male with a pineal cyst, s/p ETV and endoscopic partial resection of pineal cyst on 12/23.    Existing Precautions/Restrictions other (see comments)  (craniotomy)   General Observations and Info pt motivated in therapy.    Cognitive Status Examination   Orientation Status orientation to person, place and time   Affect/Mental Status (Cognitive) WNL   Follows Commands WNL   Cognitive Status Comments no concerns.    Visual Perception   Visual Impairment/Limitations WNL   Sensory   Sensory Quick Adds No deficits were identified   Range of Motion Comprehensive   General Range of Motion no range of motion deficits identified   Strength Comprehensive (MMT)   General Manual Muscle Testing (MMT) Assessment no strength deficits identified   Coordination   Coordination Comments no concerns. Pt initially with wobbly gait, progressing with ambulation.    Bed Mobility    Bed Mobility supine-sit;sit-supine   Supine-Sit [place] (Bed Mobility)   (education required. )   Sit-Supine [place] (Bed Mobility)   (education required. )   Balance   Balance Assessment standing balance: dynamic   Standing Balance: Dynamic good balance   Activities of Daily Living   BADL Assessment/Intervention lower body dressing   Lower Body Dressing Assessment/Training   [place] Level (Lower Body Dressing)   (education required. )   Instrumental Activities of Daily Living (IADL)   Previous Responsibilities meal prep;housekeeping;laundry;shopping;medication management;finances;driving   Clinical Impression   Criteria for Skilled Therapeutic Interventions Met (OT) yes   OT Diagnosis decreased ADL I   Assessment of Occupational Performance 3-5 Performance Deficits   Identified Performance Deficits dressing, bed mobility, comunity mobility.    Planned Therapy Interventions (OT) ADL retraining;home program guidelines;progressive activity/exercise;risk factor education   Clinical Decision Making Complexity (OT) low complexity   Therapy Frequency (OT) Other (see comments)  (one time eval and treat. )   Risks and Benefits of Treatment have been explained. Yes   Patient, Family & other staff in agreement with plan of care Yes   Comment-Clinical Impression Pt presents to OT with post surgical precuations leading to decreased ADL I. Pt to benefit from skilled OT intervention to address the above problem list.    OT Discharge Planning    OT Discharge Recommendation (DC Rec) home   OT Rationale for DC Rec pt able to complete ADL sufficiently and safely within precautions.    OT Brief overview of current status  Pt ambulating I in [facility] with full understanding of precautions. Pt mod I in BR for G/H and LE dressing.    Total Evaluation Time (Minutes)   Total Evaluation Time (Minutes) 3

## 2020-12-24 NOTE — DISCHARGE SUMMARY
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 Found to have a pineal gland cyst and some obstructive hydrocephalus. He was offered above surgery.           Hospital Course:   Patient underwent above-mentioned procedure on 12/23/2020. The operation was uncomplicated and he was admitted to the surgical ICU for routine post operative cares. On post operative day 1, he was doing well and transferred to the floor. On post operative day 2, he was ambulating, voiding without a gonzalez, eating a regular diet, pain was well controlled and therefore he was discharged home.          Discharge Medications:     Current Discharge Medication List      START taking these medications    Details   !! oxyCODONE (ROXICODONE) 5 MG tablet Take 1-2 tablets (5-10 mg) by mouth every 3 hours as needed for moderate to severe pain  Qty: 20 tablet, Refills: 0    Associated Diagnoses: Cyst of pineal gland       !! - Potential duplicate medications found. Please discuss with provider.      CONTINUE these medications which have NOT CHANGED    Details   ibuprofen 200 MG capsule Take 400 mg by mouth every 4 hours as needed for fever  Qty: 40 capsule, Refills: 0    Associated Diagnoses: Influenza-like illness      multivitamin, therapeutic with minerals (MULTI-VITAMIN) TABS Take 1 tablet by mouth daily      !! OXYCODONE HCL PO Take 5 mg by mouth every 8 hours       sertraline (ZOLOFT) 50 MG tablet Take 1 tablet by mouth every evening      cyclobenzaprine (FLEXERIL) 10 MG tablet        !! - Potential duplicate medications found. Please discuss with provider.                  Discharge Instructions and Follow-Up:     Discharge diet: Regular   Discharge activity: You may advance activity as tolerated.    Discharge follow-up: Follow-up with Dr. Jordin Graves MD in 2 weeks for wound check.   Wound care: Ok to shower,however no scrubbing of the wound and no soaking of the wound, meaning no bathtubs or swimming pools. Pat dry only. Leave wound open to air.  Sutures are absorbable.     Please call the on-call Neurosurgery resident ([phone]) for:    1.  Fever > 101.5  2.  Irritability, headache, vomiting, lethargy  3.  Redness, swelling or drainage from your incision         Discharge Disposition:     Discharged to home          -----------------------------------  Earl Fischer MD, MS  Neurosurgery PGY-4

## 2020-12-24 NOTE — PLAN OF CARE
PT: Physical therapy orders received. Per discussion with OT and RN, pt up independently and moving at his functional baseline. No acute PT needs have been identified, PT orders will be completed.

## 2020-12-24 NOTE — PLAN OF CARE
Major Shift Events:  Pt alert and oriented. Pt complaining of significant headache, pain managed with IV dilaudid and oxy. Pt had one bout of emesis treated with zofran followed by compazine which did achieve relief. Pt neuro status unchanged overnight. Pt in sinus tach, BPs below SBP goal of 140 without intervention. Pt voiding well.  Plan: Pt to transfer to  or possibly discharge today.   For vital signs and complete assessments, please see documentation flowsheets.    Problem: Adult Inpatient Plan of Care  Goal: Plan of Care Review  Outcome: No Change

## 2020-12-24 NOTE — PROGRESS NOTES
[facility], [place]   12/24/2020  Neurosurgery Progress Note:    Assessment:  Balwinder Perez is a 35 year old male with a pineal cyst, s/p ETV and endoscopic partial resection of pineal cyst on 12/23. Doing well post-operatively    Plan:  - Serial neuro exams  - Pain control  - HOB > 30 degrees  - Follow up pathology  - Advance diet as tolerated  - Bowel regimen  - PRN antiemetics  - IVF until taking adequate PO  - PT/OT  - SCDs for DVT proph  - Transfer to floor    -----------------------------------  Karan Russo MD  Neurosurgery Resident, PGY-2    Please contact neurosurgery resident on call with questions.    Dial * * [phone], enter [phone] when prompted.   -----------------------------------    Interval History: OR yesterday    Objective:   Temp:  [98  F (36.7  C)-99.1  F (37.3  C)] 98.8  F (37.1  C)  Pulse:  [] 84  Resp:  [14-22] 16  BP: (106-126)/(59-77) 106/69  MAP:  [74 mmHg-112 mmHg] 88 mmHg  Arterial Line BP: (101-132)/() 101/78  SpO2:  [92 %-99 %] 96 %  I/O last 3 completed shifts:  In: 2571.25 [P.O.:390; I.V.:2181.25]  Out: 1240 [Urine:1225; Blood:15]    Gen: Appears comfortable, NAD  Wound: clean, dry, intact  Neurologic:  - Alert & Oriented to person, place, time  - Follows commands briskly  - Speech fluent, spontaneous. No aphasia or dysarthria.  - No gaze preference. No apparent hemineglect.  - PERRL, EOMI  - Face symmetric  - Full strength throughout  - Sensation intact and symmetric to light touch throughout    LABS:  Recent Labs   Lab 12/24/20  0556 12/23/20  0647    140   POTASSIUM 3.6 3.7   CHLORIDE 107 107   CO2 26 27   ANIONGAP 6 6   * 109*   BUN 7 11   CR 0.94 0.98   KELLY 8.9 9.7       Recent Labs   Lab 12/24/20  0556   WBC 10.2   RBC 5.44   HGB 13.7   HCT 42.0   MCV 77*   MCH 25.2*   MCHC 32.6   RDW 13.5          IMAGING:  Recent Results (from the past 24 hour(s))   CT Head w/o Contrast    Narrative    CT HEAD W/O CONTRAST  12/23/2020 6:58 PM    Provided History: Post-Operative Evaluation; S/P Endoscopic 3rd  Ventriculostomy. Endoscopic partial resection of pineal cyst.   ICD-10: Post op evaluation. Status post endoscopic third  ventriculostomy.    Comparison: CT head same day. Brain MRI 11/25/2020.    Technique: Using multidetector thin collimation helical acquisition  technique, axial, coronal and sagittal CT images from the skull base  to the vertex were obtained without intravenous contrast.     Findings:    New postsurgical changes of right frontal approach fenestration of  pineal gland cyst and third ventriculostomy via high right frontal and  parietal vivi holes. Mild postprocedural pneumocephalus. Subcentimeter  pineal gland cyst. No intracranial hemorrhage. No midline shift. The  gray to white matter differentiation of the cerebral hemispheres is  preserved. Ventricles are proportionate to the sulci. The basal  cisterns are patent.    Minimal mucosal thickening of the right ethmoid air cells. The mastoid  air cells are clear. Orbits appear unremarkable. Small amount of  subcutaneous gas in the scalp along the surgical sites.      Impression    Impression:   Postsurgical changes of endoscopic pineal gland cyst fenestration and  third ventriculostomy.     I have personally reviewed the examination and initial interpretation  and I agree with the findings.    GINA CAMPBELL MD

## 2020-12-25 VITALS
DIASTOLIC BLOOD PRESSURE: 75 MMHG | WEIGHT: 169.97 LBS | SYSTOLIC BLOOD PRESSURE: 127 MMHG | OXYGEN SATURATION: 97 % | TEMPERATURE: 100.3 F | RESPIRATION RATE: 16 BRPM | HEIGHT: 68 IN | BODY MASS INDEX: 25.76 KG/M2 | HEART RATE: 92 BPM

## 2020-12-25 LAB — GLUCOSE BLDC GLUCOMTR-MCNC: 99 MG/DL (ref 70–99)

## 2020-12-25 PROCEDURE — 999N001017 HC STATISTIC GLUCOSE BY METER IP

## 2020-12-25 PROCEDURE — 250N000013 HC RX MED GY IP 250 OP 250 PS 637: Performed by: STUDENT IN AN ORGANIZED HEALTH CARE EDUCATION/TRAINING PROGRAM

## 2020-12-25 RX ORDER — GABAPENTIN 100 MG/1
100 CAPSULE ORAL 3 TIMES DAILY
Qty: 100 CAPSULE | Refills: 11 | Status: SHIPPED | OUTPATIENT
Start: 2020-12-25 | End: 2021-07-13

## 2020-12-25 RX ADMIN — DOCUSATE SODIUM AND SENNOSIDES 2 TABLET: 8.6; 5 TABLET, FILM COATED ORAL at 08:15

## 2020-12-25 RX ADMIN — MULTIPLE VITAMINS W/ MINERALS TAB 1 TABLET: TAB at 08:15

## 2020-12-25 RX ADMIN — OXYCODONE HYDROCHLORIDE 5 MG: 5 TABLET ORAL at 01:18

## 2020-12-25 RX ADMIN — ACETAMINOPHEN 975 MG: 325 TABLET, FILM COATED ORAL at 08:15

## 2020-12-25 RX ADMIN — OXYCODONE HYDROCHLORIDE 10 MG: 5 TABLET ORAL at 10:57

## 2020-12-25 ASSESSMENT — ACTIVITIES OF DAILY LIVING (ADL)
ADLS_ACUITY_SCORE: 16
ADLS_ACUITY_SCORE: 14
ADLS_ACUITY_SCORE: 16

## 2020-12-25 NOTE — PROGRESS NOTES
[facility], [place]   12/25/2020  Neurosurgery Progress Note:    Assessment:  Balwinder Perez is a 35 year old male with a pineal cyst, s/p ETV and endoscopic partial resection of pineal cyst on 12/23. Doing well post-operatively.    Plan:  - Serial neuro exams  - Pain control  - HOB > 30 degrees  - Follow up pathology  - Advance diet as tolerated  - Bowel regimen  - PRN antiemetics  - PT/OT  - SCDs for DVT proph  - Likely discharge today    -----------------------------------  Karan Russo MD  Neurosurgery Resident, PGY-2    Please contact neurosurgery resident on call with questions.    Dial * * [phone], enter [phone] when prompted.   -----------------------------------    Interval History: Transferred to floor    Objective:   Temp:  [98.2  F (36.8  C)-101.2  F (38.4  C)] 100.5  F (38.1  C)  Pulse:  [] 92  Resp:  [16-18] 16  BP: (107-127)/(61-81) 127/75  SpO2:  [94 %-100 %] 97 %  I/O last 3 completed shifts:  In: 960 [P.O.:960]  Out: -     Gen: Appears comfortable, NAD  Wound: clean, dry, intact  Neurologic:  - Alert & Oriented to person, place, time  - Follows commands briskly  - Speech fluent, spontaneous. No aphasia or dysarthria.  - No gaze preference. No apparent hemineglect.  - PERRL, EOMI  - Face symmetric  - Full strength throughout  - Sensation intact and symmetric to light touch throughout    LABS:  Recent Labs   Lab 12/24/20  0556 12/23/20  0647    140   POTASSIUM 3.6 3.7   CHLORIDE 107 107   CO2 26 27   ANIONGAP 6 6   * 109*   BUN 7 11   CR 0.94 0.98   KELLY 8.9 9.7       Recent Labs   Lab 12/24/20  0556   WBC 10.2   RBC 5.44   HGB 13.7   HCT 42.0   MCV 77*   MCH 25.2*   MCHC 32.6   RDW 13.5          IMAGING:  No results found for this or any previous visit (from the past 24 hour(s)).

## 2020-12-25 NOTE — PLAN OF CARE
Status: Pt POD #2 s/p ETV and endoscopic partial resection of pineal cyst 12/23.  Vitals: VSS on RA. SBP goal <140.   Neuros: A&O x4. Denies N/T. Strengths 5/5.  IV: PIV SL x2.   Labs/Electrolytes: WDL, no RN-managed order sets.  Resp/trach: LS clear  Diet: Regular, good intake.   Bowel status: BS+, No BM overnight, Last BM 12/22  : VDSP  Skin: Open head incisions with liquid bandage, FERCHO. Posterior incision covered with Primapore, CDI.  Pain: Incisional pain manged with PRN 5mg oxy and scheduled Tylenol, pt stated he pain has improved since surgery. Pt refused tylenol @ 6:45 AM, wants to eat breakfast before taking any meds, rescheduled to 0800.  Activity: Up independently in room. Calls appropriately.   Social: Pt expressed he is ready to be discharged & family members can pick him up  Plan: Continue to monitor and follow plan of care.

## 2020-12-25 NOTE — PROGRESS NOTES
Arrived from:  4A around 1815  Belongings/meds:  Phone, clothing, wallet  2 RN Skin Assessment Completed by:  Danielle ZAVALA RN and ROMEO Borjas  Non-intact findings documented (yes/no/NA): incision on forehead, posterior head incision covered with Primapore, no drainage; otherwise intact

## 2020-12-25 NOTE — PLAN OF CARE
Pt discharged in stable condition home w/wife - AVS given and discussed, questions answered. Belongings sent with patient.

## 2020-12-25 NOTE — PLAN OF CARE
Status: Pt transferred from 4A today POD #1 s/p ETV and endoscopic partial resection of pineal cyst 12/23.  Vitals: VSS on RA ex tachy intermittently. SBP goal <140.   Neuros: A&O x4. Denies N/T. Strengths 5/5.  IV: PIV SL x2.   Labs/Electrolytes: WDL, no RN-managed order sets.  Resp/trach: LS clear. Denies SOB.   Diet: Regular, good intake.   Bowel status: BS+. Last BM before surgery. BM meds given.   : Voids spontaneously without difficulty.   Skin: 2 R head incisions, posterior incision covered with Primapore, CDI.  Pain: C/o incision head pain, PRN oxy and scheduled Tylenol given, partially effective  Activity: Up independently in room. Calls appropriately.   Social: Pt updating family over the phone.   Plan: Discharge home tomorrow. Continue to monitor and follow plan of care.

## 2020-12-25 NOTE — PLAN OF CARE
Transferred to: Unit 6A at 1730.   Belongings: Cell phone, , and clothing sent with patient.   Chart and medications sent with patient: Yes.   Family notified: Patient using personal cell phone to notify family of transfer.

## 2020-12-25 NOTE — PROGRESS NOTES
SPIRITUAL HEALTH SERVICES  SPIRITUAL ASSESSMENT Progress Note  [facility] U U U 6A     REFERRAL SOURCE: Self initiated visit     I spoke to the Pt's wife, who shared that she is RN and she is well aware of what  is going on with her spouse.She did welcomed the spiritual support, and asked for follow up.I prayed with her I hope may Manuelah have mercy on her spouse.    PLAN: Follow up as needed.[facility] is always available for support upon request.    Carol Garza  Chaplain RESIDENT

## 2020-12-26 ENCOUNTER — PATIENT OUTREACH (OUTPATIENT)
Dept: CARE COORDINATION | Facility: CLINIC | Age: 35
End: 2020-12-26

## 2020-12-27 NOTE — ANESTHESIA POSTPROCEDURE EVALUATION
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     Anesthesia Type: General       Disposition: ICU            ICU Sign Out: Anesthesiologist/ICU physician sign out WAS performed   Postop Pain Control: Uneventful            Sign Out: Well controlled pain   PONV: No   Neuro/Psych: Uneventful            Sign Out: Acceptable/Baseline neuro status   Airway/Respiratory: Uneventful            Sign Out: Acceptable/Baseline resp. status   CV/Hemodynamics: Uneventful            Sign Out: Acceptable CV status   Other NRE: NONE   DID A NON-ROUTINE EVENT OCCUR? No         Last Anesthesia Record Vitals:  CRNA VITALS  2020 1112 - 2020 1212      2020             Temp:  (!) 20.1  C (68.2  F)          Last PACU Vitals:  Vitals Value Taken Time   /73 20 1500   Temp 36.7  C (98.1  F) 20 1315   Pulse 103 20 1530   Resp 22 20 1530   SpO2 99 % 20 1530   Temp src     NIBP     Pulse     SpO2     Resp     Temp 20.1  C (68.2  F) 20 1151   Ht Rate     Temp 2           Electronically Signed By: Sada Coles MD, 2020, 2:56 PM

## 2020-12-28 ENCOUNTER — PATIENT OUTREACH (OUTPATIENT)
Dept: NEUROSURGERY | Facility: CLINIC | Age: 35
End: 2020-12-28

## 2020-12-28 NOTE — PROGRESS NOTES
[facility]: Post-Discharge Note  SITUATION                                                      Admission:    Admission Date: 12/23/20   Reason for Admission: stealth assisted fenstration of cyst  Discharge:   Discharge Date: 12/25/20  Discharge Diagnosis: Acquired obstructive hydrocephalus; Cyst of pineal gland  Discharge Service: Neurosurgery  Discharge Plan: Routine postop follow up    BACKGROUND                                                      Neurosurgery Discharge Coordination Note     Attending physician: Dr. Graves  Discharge to: Home     Current status: Patient states he still has bilateral ear ringing and that the back of his head still hurts. Pt states that he brought this to the attention of care team in hospital and they said it may be due to positioning during the case. Pt says incision pain is 4/10 without oxycodone and this is tolerable for him. He has not taken any oxy today as he says he is trying to wean down on the pain medication. He says the acute surgery pain is overlapping his preoperative symptoms, so it is difficult to distinguish the level of improvement. Forehead swelling is somewhat reduced; pt also notes mild right side swelling near temple.  Denies redness, swelling, increased tenderness, drainage, incision opening or elevated temp. Reports Incision CDI without signs of infection.  Denies current bowel or bladder issues.    Discharge instructions and medications reviewed with patient.  Follow up appointments/imaging/tests needed: 2 week post op with Dr. Graves 1/12/21.     RN triage/on call number given: [phone]/ [phone]    Pt requests that I fax operative note and clinic progress note to [facility], which I will do.     ASSESSMENT      Discharge Assessment  Patient reports symptoms are: Improved  Does the patient have all of their medications?: Yes  Does patient know what their new medications are for?: Yes  Does patient have a follow-up  appointment scheduled?: Yes  Does patient have any other questions or concerns?: No    Post-op  Did the patient have surgery or a procedure: Yes  Incision: closed;healing  Drainage: No  Bleeding: none  Fever: No  Chills: No  Redness: No  Warmth: No  Swelling: Yes(temple)  Incision site pain: Yes  Closure: suture  Eating & Drinking: eating and drinking without complaints/concerns  PO Intake: regular diet  Bowel Function: normal  Urinary Status: voiding without complaint/concerns        PLAN                                                      Outpatient Plan:  Routine postop follow up    Future Appointments   Date Time Provider Department Center   1/12/2021  1:00 PM Jordin Graves MD [facility]           HEATHER MALDONADO RN

## 2021-01-05 NOTE — OP NOTE
Patient: Balwinder Perez  MRN: [id number]     Procedure date: 12/23/2020     PREOPERATIVE DIAGNOSIS:  1. Obstructive Hydrocephalus  2.   Pineal cyst     POSTOPERATIVE DIAGNOSIS:  1. Obstructive Hydrocephalus  2.   Pineal cyst     PROCEDURES PERFORMED:  1. Right sided endoscopic pineal region biopsy   2. Endoscopic third ventriculostomy  3. Stereotactic navigation     ATTENDING SURGEONS  1.  Jordin Graves MD  2.  Lawrence Kebede MD     RESIDENT SURGEON  Ashu Frey MD      INDICATIONS:  Balwinder is a previously healthy 35 year-old gentleman with no past medical history who developed relatively sudden-onset neck pain that he associated with a lump on the right side. He underwent further evaluation by his PCP and was eventually found to have a complex set of cysts in the pineal region that were overlapping with the aqueduct. After a lengthy discussion about the potential role that it may be playing in intermittent obstructive hydrocephalus, Balwinder decided to move forward with cysts fenestration, biopsy, and ETV. He underwent the informed consent process including a discussion of the potential alternatives, risks, and benefits.      DESCRIPTION OF PROCEDURE:  After the informed consent was verified, he was brought to the operating room where he his identity was confirmed. He underwent successful endotracheal intubation with general anesthesia. He was positioned supine on the operating table with his head in neutral position.     Stereotaxis  His preoperatively obtained stereotactic scans were transferred to the operating room navigation station where optimal trajectories for the ETV and biopsy were planned including separate entry vivi holes and target points. An aXiem emitter was placed. His head was coregistered to the neuronavigation with excellent accuracy.     Neuronavigation was used to identify the scalp above the two vivi holes. His scalp was shaved, cleaned, prepped, and draped in the usual fashion. A timeout was  performed.       A small curvilinear incision at the hairline and another posterior semicircular incision were planned and 10cc of 1% lidocaine with 1:[id number] epinephrine were injected along both.     Endoscopic Third Ventriculostomy    After a sufficient amount of time was waited for maximal effect of the local anesthetic and epinephrine, a sharp incision was made with a #10 scalpel at the posterior incision. Hemostasis was obtained with monopolar and bipolar cautery. Subperiosteal dissection was performed with a periosteal elevator. Neuronavigation was used to confirm target.  A high speed drill with cutter was used to make a single burrhole. The underlying dura was coagulated with bipolar cautery and a #15 scalpel was used to perform a cruciate incision on the dura. The pial surface was coagulated and a small cortisectomy performed. The neuronavigator was used to guide placement of endoscopic sheath with immediate return of CSF. The endoscopic sheath was secured to the scalp with staples and endoscope was inserted. The ventricular anatomy demonstrated our location in the right lateral ventricle. The endoscope was advanced carefully past the Foramen of Shearer and the mamillary bodies identified. A Alvaro catheter was advanced and used to create an opening in the floor of the third ventricle. The balloon was inflated with .2 ml of air to dilate the opening. We found his third ventricular floor to be generally quite small and thick. We were able to maximize the opening of the ventriculostomy though overall it was smaller than average. Very little bleeding was observed. The endoscope was carefully advanced through the ventriculostomy where we observed CSF flow and Liliequist's membrane. Once satisfied, we removed the endoscope.     Pineal Cyst Fenestration and Biopsy    Next, we turned our attention to the pineal mass biopsy part of the procedure. A #10 scalpel was used to make an incision at the hairline. A 5mm vivi  was used to make a small vivi hole at the planned vivi hole. The dura was coagulated and incised. The arachnoid  was coagulated and small cortisectomy incised. Neuronavigation was used to guide advancement of endoscope sheath into the lateral ventricle. The endoscope was advanced through the sheath where we were able to confirm our location in the Right lateral ventricle. We carefully advanced through the foramen of courtney into the third ventricle. We were able to see the pineal region cyst. Graspers were used to open the cyst where we found that proteinaceous fluid exude. We collected several samples of cyst wall for final pathology. A small amount of bleeding was observed and coagulated with bigbee cautery. Hemostasis was observed with continued irrigation. The endoscope was removed. Both endoscope sheaths were removed, and we observed for hemostasis, which was confirmed. Gelfoam was placed into each vivi hole after copiously irrigating.      The galea in both incisions was closed with 2-0 Vicryl and the skin closed with running 3-0 monocryl. Both incisions were cleaned, dried, prepped, and dried before applying exofin.      Final needle and sponge count was correct x2.       ATTESTATION: My signature attests that I was present for the key portions and immediately available for the entire operation described above. I agree with the above findings.    JOSÉ MIGUEL DAVIS MD

## 2021-01-06 ENCOUNTER — OFFICE VISIT (OUTPATIENT)
Dept: AUDIOLOGY | Facility: CLINIC | Age: 36
End: 2021-01-06
Payer: COMMERCIAL

## 2021-01-06 ENCOUNTER — OFFICE VISIT (OUTPATIENT)
Dept: OTOLARYNGOLOGY | Facility: CLINIC | Age: 36
End: 2021-01-06
Payer: COMMERCIAL

## 2021-01-06 VITALS
WEIGHT: 170 LBS | HEIGHT: 68 IN | RESPIRATION RATE: 16 BRPM | BODY MASS INDEX: 25.76 KG/M2 | SYSTOLIC BLOOD PRESSURE: 115 MMHG | HEART RATE: 62 BPM | OXYGEN SATURATION: 96 % | DIASTOLIC BLOOD PRESSURE: 81 MMHG

## 2021-01-06 DIAGNOSIS — H81.11 BENIGN PAROXYSMAL POSITIONAL VERTIGO, RIGHT: Primary | ICD-10-CM

## 2021-01-06 DIAGNOSIS — H90.3 SENSORINEURAL HEARING LOSS (SNHL) OF BOTH EARS: ICD-10-CM

## 2021-01-06 DIAGNOSIS — H90.3 SENSORINEURAL HEARING LOSS, BILATERAL: Primary | ICD-10-CM

## 2021-01-06 DIAGNOSIS — H93.13 TINNITUS, BILATERAL: ICD-10-CM

## 2021-01-06 PROCEDURE — 99204 OFFICE O/P NEW MOD 45 MIN: CPT | Performed by: OTOLARYNGOLOGY

## 2021-01-06 PROCEDURE — 92557 COMPREHENSIVE HEARING TEST: CPT | Performed by: AUDIOLOGIST

## 2021-01-06 PROCEDURE — 92550 TYMPANOMETRY & REFLEX THRESH: CPT | Performed by: AUDIOLOGIST

## 2021-01-06 PROCEDURE — 99207 PR NO CHARGE LOS: CPT | Performed by: AUDIOLOGIST

## 2021-01-06 ASSESSMENT — MIFFLIN-ST. JEOR: SCORE: 1680.61

## 2021-01-06 ASSESSMENT — PAIN SCALES - GENERAL: PAINLEVEL: NO PAIN (0)

## 2021-01-06 NOTE — PROGRESS NOTES
Chief Complaint - Dizziness, tinnitus    History of Present Illness - Balwinder Perez is a 35 year old male who presents with dizziness and tinnitus. 2-3 months ago this started. The patient describes a day of vertigo in which there was movement. Now he feels dizziness with certain head movements or certain position. Now it lasts for seconds or a minute.  This has been going on for a couple months. Now it is mild dizziness. He has a constant noise in ears for at least a month. It has been the same pre and post surgery. The patient had an MRI brain from 11/25/2020 for a pineal cyst, and I personally reviewed the images which showed a 1.6 x 1.2 cm cyst. This cyst does narrow the entrance to the cerebral aqueduct. I see no retrocochear pathology near the IAC or inner ear. He had surgery to drain the cyst. I also reviewed the CT head images from 12/23/2020 showing pre-and post-surgical images of the pineal cyst and then with removal of the pineal cyst.  I personally reviewed the relevant clinical notes in Epic including the primary care providers note and neurosurgery notes. After surgery, he still has dizziness and tinnitus. He feels his hearing is okay, but sometimes he feels his right isn't as good. Mom has a little hearing loss. She has hearing aids, maybe in her 70s when this started. Since surgery headaches are better. Some visual disturbance is better.     Past Medical History -   Patient Active Problem List   Diagnosis     CARDIOVASCULAR SCREENING; LDL GOAL LESS THAN 160     Neck pain     Acquired obstructive hydrocephalus (H)     Cyst of pineal gland       Current Medications -   Current Outpatient Medications:      cyclobenzaprine (FLEXERIL) 10 MG tablet, , Disp: , Rfl:      gabapentin (NEURONTIN) 100 MG capsule, Take 1 capsule (100 mg) by mouth 3 times daily, Disp: 100 capsule, Rfl: 11     ibuprofen 200 MG capsule, Take 400 mg by mouth every 4 hours as needed for fever, Disp: 40 capsule, Rfl: 0     multivitamin,  therapeutic with minerals (MULTI-VITAMIN) TABS, Take 1 tablet by mouth daily, Disp: , Rfl:      oxyCODONE (ROXICODONE) 5 MG tablet, Take 1-2 tablets (5-10 mg) by mouth every 3 hours as needed for moderate to severe pain, Disp: 20 tablet, Rfl: 0     OXYCODONE HCL PO, Take 5 mg by mouth every 8 hours, Disp: , Rfl:      sertraline (ZOLOFT) 50 MG tablet, Take 1 tablet by mouth every evening, Disp: , Rfl:     Allergies - No Known Allergies    Social History -   Social History     Socioeconomic History     Marital status:      Spouse name: Not on file     Number of children: Not on file     Years of education: Not on file     Highest education level: Not on file   Occupational History     Not on file   Social Needs     Financial resource strain: Not on file     Food insecurity     Worry: Not on file     Inability: Not on file     Transportation needs     Medical: Not on file     Non-medical: Not on file   Tobacco Use     Smoking status: Former Smoker     Packs/day: 0.00     Years: 0.00     Pack years: 0.00     Quit date: 2012     Years since quittin.1     Smokeless tobacco: Never Used   Substance and Sexual Activity     Alcohol use: Not Currently     Drug use: Never     Sexual activity: Yes     Partners: Female   Lifestyle     Physical activity     Days per week: Not on file     Minutes per session: Not on file     Stress: Not on file   Relationships     Social connections     Talks on phone: Not on file     Gets together: Not on file     Attends [employer] service: Not on file     Active member of club or organization: Not on file     Attends meetings of clubs or organizations: Not on file     Relationship status: Not on file     Intimate partner violence     Fear of current or ex partner: Not on file     Emotionally abused: Not on file     Physically abused: Not on file     Forced sexual activity: Not on file   Other Topics Concern     Parent/sibling w/ CABG, MI or angioplasty before 65F 55M? No   Social  "History Narrative     Not on file       Family History -   Family History   Problem Relation Age of Onset     Thyroid Disease Mother      Cancer Father         lung cancer-passed away     Other Cancer Father         Lung     Asthma Brother      C.A.D. No family hx of      Diabetes No family hx of      Hypertension No family hx of      Cerebrovascular Disease No family hx of      Review of Systems - As per HPI and PMHx, otherwise 7 system review of the head and neck is negative.    Physical Exam  /81   Pulse 62   Resp 16   Ht 1.727 m (5' 8\")   Wt 77.1 kg (170 lb)   SpO2 96%   BMI 25.85 kg/m    General - The patient is in no distress.  Alert and oriented x3, answers questions and cooperates with examination appropriately.   Voice and Breathing - The patient was breathing comfortably without the use of accessory muscles. There was no wheezing, stridor, or stertor.  The patients voice was clear and strong, with no dysphonia.    Eyes - Pupils are reactive to light. Extraocular movements intact. Sclera were not icteric or injected, conjunctiva were pink and moist. No nystagmus.  Ears - The auricles appeared normal. The external auditory canals were nonedematous and nonerythematous. The tympanic membranes are normal in appearance, bony landmarks are intact.  No retraction, perforation, or masses.  No fluid or purulence was seen in the external canal or the middle ear.   Neurologic - Cranial nerves II-XII are grossly intact. Specifically, the facial nerve is intact, House-Brackmann grade 1 of 6. Nica Hallpike was abnormal to the right.  Neck -  Soft, nontender. Palpation of the occipital, submental, submandibular, internal jugular chain, and supraclavicular nodes did not demonstrate any abnormal lymph nodes or masses. The parotid glands were without masses. Palpation of the thyroid was soft and smooth, with no nodules or goiter appreciated.  The trachea was midline.      Audiologic Studies - An audiogram and " tympanogram were performed today as part of the evaluation and personally reviewed. The tympanogram shows normal Type A curves, with normal canal volumes and middle ear pressures. The audiogram showed down-sloping mild sensorineural hearing loss both ears.       CARYN/P Cristiano Perez is a 35 year old male with dizziness. 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  It seems he had a bout of vestibular neuritis with intense vertigo for a day that was at the beginning of all this.  It is possible there is a component of labyrinthitis as he does have mild bilateral sensorineural hearing loss.  I think this is the cause of his tinnitus.  I am unsure if it is related to the dizziness though.  He has had a pineal cyst that has been drained and may have had intermittent hydrocephalus before this was drained.  This is better and he still has the dizziness which is also consistent with BPPV.  It is possible the stress of this has made the tinnitus worse or more noticeable.  I recommend he try the Epley and referred him to physical therapy.  Needs to repeat the audiogram in 1 year to check on the sensorineural hearing loss.  We discussed ways to help tinnitus including masking, decreasing stress and anxiety.  I think this will improve with time.  Return in 1 year, sooner if things worsen.         Shabbir Galvez MD  Otolaryngology  [facility]

## 2021-01-06 NOTE — PROGRESS NOTES
AUDIOLOGY REPORT:    Patient was referred from ENT by Hebert Galvez MD for audiology evaluation. Patient reports dizziness and tinnitus for the last several weeks, which has not dissipated following his recent brain surgery. He reports some left ear fullness but does not reports history of noise exposure.    Testing:    Otoscopy:   Otoscopic exam indicates ears are clear of cerumen bilaterally     Tympanograms:    RIGHT: normal eardrum mobility     LEFT:   normal eardrum mobility    Reflexes (reported by stimulus ear):  RIGHT: Ipsilateral is present at normal levels  RIGHT: Contralateral is elevated at frequencies tested  LEFT:   Ipsilateral is present at normal levels  LEFT:   Contralateral is absent at frequencies tested    Thresholds:   Pure Tone Thresholds assessed using conventional audiometry with good reliability from 250-8000 Hz bilaterally using insert earphones and circumaural headphones      RIGHT:  normal from 250-2000 Hz sloping to borderline normal and mild sensorineural hearing loss from 6021-5715 Hz    LEFT:   normal from 250-1000 Hz sloping to mild sensorineural hearing loss from 1882-4095 Hz    Speech Reception Threshold:    RIGHT: 25 dB HL    LEFT:   25 dB HL  Speech Reception Thresholds are in good agreement with pure tone thresholds.    Word Recognition Score:     RIGHT: 96% at 65 dB HL using NU-6 recorded word list.    LEFT:   100% at 65 dB HL using NU-6 recorded word list.    Discussed results with the patient.     Patient was returned to ENT for follow up.     Jordan Mahmood MA, CCC-A  Licensed Audiologist #[id number]  1/6/2021

## 2021-01-06 NOTE — LETTER
1/6/2021         RE: Balwinder Perez  [address] [address]        Dear Colleague,    Thank you for referring your patient, Balwinder Perez, to the [facility]. Please see a copy of my visit note below.    Chief Complaint - Dizziness, tinnitus    History of Present Illness - Balwinder Perez is a 35 year old male who presents with dizziness and tinnitus. 2-3 months ago this started. The patient describes a day of vertigo in which there was movement. Now he feels dizziness with certain head movements or certain position. Now it lasts for seconds or a minute.  This has been going on for a couple months. Now it is mild dizziness. He has a constant noise in ears for at least a month. It has been the same pre and post surgery. The patient had an MRI brain from 11/25/2020 for a pineal cyst, and I personally reviewed the images which showed a 1.6 x 1.2 cm cyst. This cyst does narrow the entrance to the cerebral aqueduct. I see no retrocochear pathology near the IAC or inner ear. He had surgery to drain the cyst. I also reviewed the CT head images from 12/23/2020 showing pre-and post-surgical images of the pineal cyst and then with removal of the pineal cyst.  I personally reviewed the relevant clinical notes in Epic including the primary care providers note and neurosurgery notes. After surgery, he still has dizziness and tinnitus. He feels his hearing is okay, but sometimes he feels his right isn't as good. Mom has a little hearing loss. She has hearing aids, maybe in her 70s when this started. Since surgery headaches are better. Some visual disturbance is better.     Past Medical History -   Patient Active Problem List   Diagnosis     CARDIOVASCULAR SCREENING; LDL GOAL LESS THAN 160     Neck pain     Acquired obstructive hydrocephalus (H)     Cyst of pineal gland       Current Medications -   Current Outpatient Medications:      cyclobenzaprine (FLEXERIL) 10 MG tablet, , Disp: , Rfl:       gabapentin (NEURONTIN) 100 MG capsule, Take 1 capsule (100 mg) by mouth 3 times daily, Disp: 100 capsule, Rfl: 11     ibuprofen 200 MG capsule, Take 400 mg by mouth every 4 hours as needed for fever, Disp: 40 capsule, Rfl: 0     multivitamin, therapeutic with minerals (MULTI-VITAMIN) TABS, Take 1 tablet by mouth daily, Disp: , Rfl:      oxyCODONE (ROXICODONE) 5 MG tablet, Take 1-2 tablets (5-10 mg) by mouth every 3 hours as needed for moderate to severe pain, Disp: 20 tablet, Rfl: 0     OXYCODONE HCL PO, Take 5 mg by mouth every 8 hours, Disp: , Rfl:      sertraline (ZOLOFT) 50 MG tablet, Take 1 tablet by mouth every evening, Disp: , Rfl:     Allergies - No Known Allergies    Social History -   Social History     Socioeconomic History     Marital status:      Spouse name: Not on file     Number of children: Not on file     Years of education: Not on file     Highest education level: Not on file   Occupational History     Not on file   Social Needs     Financial resource strain: Not on file     Food insecurity     Worry: Not on file     Inability: Not on file     Transportation needs     Medical: Not on file     Non-medical: Not on file   Tobacco Use     Smoking status: Former Smoker     Packs/day: 0.00     Years: 0.00     Pack years: 0.00     Quit date: 2012     Years since quittin.1     Smokeless tobacco: Never Used   Substance and Sexual Activity     Alcohol use: Not Currently     Drug use: Never     Sexual activity: Yes     Partners: Female   Lifestyle     Physical activity     Days per week: Not on file     Minutes per session: Not on file     Stress: Not on file   Relationships     Social connections     Talks on phone: Not on file     Gets together: Not on file     Attends Zoroastrianism service: Not on file     Active member of club or organization: Not on file     Attends meetings of clubs or organizations: Not on file     Relationship status: Not on file     Intimate partner violence     Fear of  "current or ex partner: Not on file     Emotionally abused: Not on file     Physically abused: Not on file     Forced sexual activity: Not on file   Other Topics Concern     Parent/sibling w/ CABG, MI or angioplasty before 65F 55M? No   Social History Narrative     Not on file       Family History -   Family History   Problem Relation Age of Onset     Thyroid Disease Mother      Cancer Father         lung cancer-passed away     Other Cancer Father         Lung     Asthma Brother      C.A.D. No family hx of      Diabetes No family hx of      Hypertension No family hx of      Cerebrovascular Disease No family hx of      Review of Systems - As per HPI and PMHx, otherwise 7 system review of the head and neck is negative.    Physical Exam  /81   Pulse 62   Resp 16   Ht 1.727 m (5' 8\")   Wt 77.1 kg (170 lb)   SpO2 96%   BMI 25.85 kg/m    General - The patient is in no distress.  Alert and oriented x3, answers questions and cooperates with examination appropriately.   Voice and Breathing - The patient was breathing comfortably without the use of accessory muscles. There was no wheezing, stridor, or stertor.  The patients voice was clear and strong, with no dysphonia.    Eyes - Pupils are reactive to light. Extraocular movements intact. Sclera were not icteric or injected, conjunctiva were pink and moist. No nystagmus.  Ears - The auricles appeared normal. The external auditory canals were nonedematous and nonerythematous. The tympanic membranes are normal in appearance, bony landmarks are intact.  No retraction, perforation, or masses.  No fluid or purulence was seen in the external canal or the middle ear.   Neurologic - Cranial nerves II-XII are grossly intact. Specifically, the facial nerve is intact, House-Brackmann grade 1 of 6. Nica Hallpike was abnormal to the right.  Neck -  Soft, nontender. Palpation of the occipital, submental, submandibular, internal jugular chain, and supraclavicular nodes did not " demonstrate any abnormal lymph nodes or masses. The parotid glands were without masses. Palpation of the thyroid was soft and smooth, with no nodules or goiter appreciated.  The trachea was midline.      Audiologic Studies - An audiogram and tympanogram were performed today as part of the evaluation and personally reviewed. The tympanogram shows normal Type A curves, with normal canal volumes and middle ear pressures. The audiogram showed down-sloping mild sensorineural hearing loss both ears.       CARYN/P Cristiano Perez is a 35 year old male with dizziness. 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  It seems he had a bout of vestibular neuritis with intense vertigo for a day that was at the beginning of all this.  It is possible there is a component of labyrinthitis as he does have mild bilateral sensorineural hearing loss.  I think this is the cause of his tinnitus.  I am unsure if it is related to the dizziness though.  He has had a pineal cyst that has been drained and may have had intermittent hydrocephalus before this was drained.  This is better and he still has the dizziness which is also consistent with BPPV.  It is possible the stress of this has made the tinnitus worse or more noticeable.  I recommend he try the Epley and referred him to physical therapy.  Needs to repeat the audiogram in 1 year to check on the sensorineural hearing loss.  We discussed ways to help tinnitus including masking, decreasing stress and anxiety.  I think this will improve with time.  Return in 1 year, sooner if things worsen.         Shabbir Galvez MD  Otolaryngology  [facility]      Again, thank you for allowing me to participate in the care of your patient.        Sincerely,        Shabbir Galvez MD

## 2021-01-06 NOTE — PATIENT INSTRUCTIONS
General Scheduling Information  To schedule your CT/MRI scan, please contact [place] [facility] at [phone]   [address] [place] [address]    To schedule your Surgery, please contact our Specialty Schedulers at [phone]    ENT Clinic Locations Clinic Hours Telephone Number     [facility]  [address] [place] [address]   Tuesday:       8:00am -- 4:00pm    Wednesday:  8:00am - 4:00pm   To schedule an appointment with   Dr. Galvez,   please contact our   Specialty Scheduling Department at:     [phone]       [facility]  [address] [place] [address]   Friday:          8:00am - 4:00pm         Urgent Care Locations Clinic Hours Telephone Numbers     [facility] Bhatti  [address] [place] [address]     Monday-Friday:     11:00pm - 9:00pm    Saturday-Sunday:  9:00am - 5:00pm   [phone]     [facility]  [address] [place] [place] [address]     Monday-Friday:      5:00pm - 9:00pm     Saturday-Sunday:  9:00am - 5:00pm   [phone]

## 2021-01-12 ENCOUNTER — OFFICE VISIT (OUTPATIENT)
Dept: NEUROSURGERY | Facility: CLINIC | Age: 36
End: 2021-01-12
Payer: COMMERCIAL

## 2021-01-12 VITALS
OXYGEN SATURATION: 99 % | HEART RATE: 93 BPM | BODY MASS INDEX: 26.22 KG/M2 | RESPIRATION RATE: 16 BRPM | SYSTOLIC BLOOD PRESSURE: 115 MMHG | DIASTOLIC BLOOD PRESSURE: 75 MMHG | WEIGHT: 173 LBS | HEIGHT: 68 IN

## 2021-01-12 DIAGNOSIS — C71.9 GLIOMA (H): Primary | ICD-10-CM

## 2021-01-12 DIAGNOSIS — G91.1 ACQUIRED OBSTRUCTIVE HYDROCEPHALUS (H): ICD-10-CM

## 2021-01-12 DIAGNOSIS — F51.02 ADJUSTMENT INSOMNIA: ICD-10-CM

## 2021-01-12 PROCEDURE — 99024 POSTOP FOLLOW-UP VISIT: CPT | Performed by: NEUROLOGICAL SURGERY

## 2021-01-12 RX ORDER — LANOLIN ALCOHOL/MO/W.PET/CERES
3 CREAM (GRAM) TOPICAL
Qty: 30 TABLET | Refills: 3 | Status: SHIPPED | OUTPATIENT
Start: 2021-01-12 | End: 2021-03-03

## 2021-01-12 ASSESSMENT — MIFFLIN-ST. JEOR: SCORE: 1694.22

## 2021-01-12 ASSESSMENT — PAIN SCALES - GENERAL: PAINLEVEL: EXTREME PAIN (9)

## 2021-01-12 NOTE — PROGRESS NOTES
"Neurosurgery Clinic Note    Reason for Visit: surgical follow-up    History of Present Illness  Balwinder Perez is a 35 year old now a few weeks s/p endoscopic fenestration and biopsy of a pineal region lesion as well as endoscopic third ventriculostomy. He is doing ok but treating his ongoing neck and headaches with aleve and tylenol, which don't seem to have significant effect. When he falls asleep the pain goes away. He often notices tingling or a strange feeling in his scalp that has been plaguing him since August.    He also continues to have significant insomnia, but he has not tried any treatment for his trouble getting to sleep.       No Known Allergies    Current Outpatient Medications   Medication     cyclobenzaprine (FLEXERIL) 10 MG tablet     gabapentin (NEURONTIN) 100 MG capsule     ibuprofen 200 MG capsule     melatonin 3 MG tablet     multivitamin, therapeutic with minerals (MULTI-VITAMIN) TABS     oxyCODONE (ROXICODONE) 5 MG tablet     OXYCODONE HCL PO     sertraline (ZOLOFT) 50 MG tablet     No current facility-administered medications for this visit.              Physical Exam  /75   Pulse 93   Resp 16   Ht 1.727 m (5' 8\")   Wt 78.5 kg (173 lb)   SpO2 99%   BMI 26.30 kg/m        General: Awake, alert, oriented. Well nourished, well developed, no acute distress.  HEENT: Head normocephalic, atraumatic.  Heart: Regular rhythm and rate. No murmurs.  Lungs: Clear to auscultation bilaterally  Abdomen: Soft, non-tender, non-distended.  Extremity: Warm with no clubbing or cyanosis. No lower extremity edema.    Neurological  Awake, alert and oriented to date, time, place and person. Speech fluent.   Pupils equal, round, reactive to light.  Extraocular movement intact.  Visual fields are full on confrontation.  Hearing is grossly normal to finger rub.   Facial sensation intact.  Face symmetric.  Tongue midline.  Uvula elevates equally.    Motor: full strength throughout.  Sensation: intact to light " touch and pinpoint.  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 We will repeat his MRI with and without contrast for surveillance of the lesion. We will start him on melatonin for his insomnia, and I will refer him to our headache specialist to continue to work with him on his headaches. His MRI c-spine and MRI brain do not reveal any other pathology, and now that his cyst is fenestrated, I do not have concerns about hydrocephalus or intermittent hydrocephalus.     Lastly, I think it would be worth his time to speak with a psychologist to help him through some of these challenging times.    Jordin Graves MD  Department of Neurosurgery  [facility]

## 2021-01-12 NOTE — LETTER
"1/12/2021       RE: Balwinder Perez  [address] [address]     Dear Colleague,    Thank you for referring your patient, Balwinder Perez, to the [facility] NEUROSURGERY [facility]INIC [place] at [facility]. Please see a copy of my visit note below.    Neurosurgery Clinic Note    Reason for Visit: surgical follow-up    History of Present Illness  Balwinder Perez is a 35 year old now a few weeks s/p endoscopic fenestration and biopsy of a pineal region lesion as well as endoscopic third ventriculostomy. He is doing ok but treating his ongoing neck and headaches with aleve and tylenol, which don't seem to have significant effect. When he falls asleep the pain goes away. He often notices tingling or a strange feeling in his scalp that has been plaguing him since August.    He also continues to have significant insomnia, but he has not tried any treatment for his trouble getting to sleep.       No Known Allergies    Current Outpatient Medications   Medication     cyclobenzaprine (FLEXERIL) 10 MG tablet     gabapentin (NEURONTIN) 100 MG capsule     ibuprofen 200 MG capsule     melatonin 3 MG tablet     multivitamin, therapeutic with minerals (MULTI-VITAMIN) TABS     oxyCODONE (ROXICODONE) 5 MG tablet     OXYCODONE HCL PO     sertraline (ZOLOFT) 50 MG tablet     No current facility-administered medications for this visit.              Physical Exam  /75   Pulse 93   Resp 16   Ht 1.727 m (5' 8\")   Wt 78.5 kg (173 lb)   SpO2 99%   BMI 26.30 kg/m        General: Awake, alert, oriented. Well nourished, well developed, no acute distress.  HEENT: Head normocephalic, atraumatic.  Heart: Regular rhythm and rate. No murmurs.  Lungs: Clear to auscultation bilaterally  Abdomen: Soft, non-tender, non-distended.  Extremity: Warm with no clubbing or cyanosis. No lower extremity edema.    Neurological  Awake, alert and oriented to date, time, place and person. Speech fluent. "   Pupils equal, round, reactive to light.  Extraocular movement intact.  Visual fields are full on confrontation.  Hearing is grossly normal to finger rub.   Facial sensation intact.  Face symmetric.  Tongue midline.  Uvula elevates equally.    Motor: full strength throughout.  Sensation: intact to light touch and pinpoint.  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 We will repeat his MRI with and without contrast for surveillance of the lesion. We will start him on melatonin for his insomnia, and I will refer him to our headache specialist to continue to work with him on his headaches. His MRI c-spine and MRI brain do not reveal any other pathology, and now that his cyst is fenestrated, I do not have concerns about hydrocephalus or intermittent hydrocephalus.     Lastly, I think it would be worth his time to speak with a psychologist to help him through some of these challenging times.    Jordin Graves MD  Department of Neurosurgery  [facility]

## 2021-01-12 NOTE — NURSING NOTE
Chief Complaint   Patient presents with     Surgical Followup     [facility] RETURN 2 WK POST OP       Ronan Sousa EMT

## 2021-01-20 ENCOUNTER — TELEPHONE (OUTPATIENT)
Dept: NEUROLOGY | Facility: CLINIC | Age: 36
End: 2021-01-20

## 2021-01-20 ENCOUNTER — PATIENT OUTREACH (OUTPATIENT)
Dept: NEUROSURGERY | Facility: CLINIC | Age: 36
End: 2021-01-20

## 2021-01-20 NOTE — TELEPHONE ENCOUNTER
Health Call Center    Phone Message    May a detailed message be left on voicemail: yes     Reason for Call: Other: Pt called to schedule appt as referred by Dr. Graves for Glioma.      Per guidelines required clinical review.    Please call Pt back to schedule appt.    Action Taken: Message routed to:  [facility] ([facility] Neurology    Travel Screening: Not Applicable

## 2021-01-20 NOTE — PROGRESS NOTES
Returned pt's call requesting disability extension. I will fax the most recent progress note to the number he sent via [employer]:  Fax [phone]   Attention claim number [id number]    Pt is requesting an additional 30 days, returning to work on 2/23/21.     No further questions at this time.

## 2021-01-21 NOTE — TELEPHONE ENCOUNTER
Writer faxed letter and progress notes from Dr. [employer]   Fax: [phone]  Claim Number [id number]    ANDERS Busby notified letter faxed   Writer left voice mail for patient the above was faxed.     Lula Sousa LPN  Neurosurgery

## 2021-01-21 NOTE — PROGRESS NOTES
Writer faxed letter and progress note to [employer].     Left voice mail for patient to inform the above was faxed.     Lula Sousa LPN

## 2021-02-04 ENCOUNTER — HOSPITAL ENCOUNTER (OUTPATIENT)
Dept: PHYSICAL THERAPY | Facility: CLINIC | Age: 36
Setting detail: THERAPIES SERIES
End: 2021-02-04
Attending: OTOLARYNGOLOGY
Payer: COMMERCIAL

## 2021-02-04 PROCEDURE — 97110 THERAPEUTIC EXERCISES: CPT | Mod: GP | Performed by: PHYSICAL THERAPIST

## 2021-02-04 PROCEDURE — 97530 THERAPEUTIC ACTIVITIES: CPT | Mod: GP | Performed by: PHYSICAL THERAPIST

## 2021-02-04 PROCEDURE — 97161 PT EVAL LOW COMPLEX 20 MIN: CPT | Mod: GP | Performed by: PHYSICAL THERAPIST

## 2021-02-25 ENCOUNTER — PRE VISIT (OUTPATIENT)
Dept: NEUROLOGY | Facility: CLINIC | Age: 36
End: 2021-02-25

## 2021-02-25 NOTE — TELEPHONE ENCOUNTER
FUTURE VISIT INFORMATION      FUTURE VISIT INFORMATION:    Date: 3/3/2021    Time: 1pm    Location: [facility]  REFERRAL INFORMATION:    Referring provider:  Dr. Graves    Referring providers clinic:  [facility] Neurosurgery     Reason for visit/diagnosis  Glioma    RECORDS REQUESTED FROM:       Clinic name Comments Records Status Imaging Status   Internal Dr. Graves-1/12/2021, 12/1/2020    MR Brain-11/19/2020, 11/25/2020    MR Cervical Spine-11/19/2020    CT Head-12/23/2020 Epic PACS

## 2021-03-03 ENCOUNTER — VIRTUAL VISIT (OUTPATIENT)
Dept: NEUROLOGY | Facility: CLINIC | Age: 36
End: 2021-03-03
Attending: NEUROLOGICAL SURGERY
Payer: COMMERCIAL

## 2021-03-03 DIAGNOSIS — G44.52 NEW DAILY PERSISTENT HEADACHE: Primary | ICD-10-CM

## 2021-03-03 PROCEDURE — 99205 OFFICE O/P NEW HI 60 MIN: CPT | Mod: 95 | Performed by: PSYCHIATRY & NEUROLOGY

## 2021-03-03 RX ORDER — AMITRIPTYLINE HYDROCHLORIDE 10 MG/1
TABLET ORAL
Qty: 70 TABLET | Refills: 0 | Status: SHIPPED | OUTPATIENT
Start: 2021-03-03 | End: 2021-07-16

## 2021-03-03 RX ORDER — AMITRIPTYLINE HYDROCHLORIDE 50 MG/1
50 TABLET ORAL AT BEDTIME
Qty: 30 TABLET | Refills: 3 | Status: SHIPPED | OUTPATIENT
Start: 2021-03-03 | End: 2021-07-16

## 2021-03-03 NOTE — LETTER
3/3/2021       RE: Balwinder Perez  [address] [address]     Dear Colleague,    Thank you for referring your patient, Balwinder Perez, to the [facility] NEUROLOGY CLINIC [place] [facility]. Please see a copy of my visit note below.    Balwinder is a 35 year old who is being evaluated via a billable video visit.      How would you like to obtain your AVS? MyChart  If the video visit is dropped, the invitation should be resent by: Send to e-mail at: [email or web address]  Will anyone else be joining your video visit? No        Video-Visit Details    Type of service:  Video Visit    Originating Location (pt. Location): Home    Distant Location (provider location):  [facility] NEUROLOGY CLINIC [place]     Platform used for Video Visit: VIKI Lazaro    [facility]   [facility]  Virtual Neurology Consult     Balwinder Perez MRN# [id number]   YOB: 1985 Age: 35 year old     Requesting physician: Jordin Graves MD         Assessment and Recommendations:     Balwinder Perez is a 35 year old male who presents for further evaluation of headache.    His headache presentation is consistent with new daily persistent headaches, or possible occipital neuralgia bilaterally, following fenestration of a pineal cyst, with final pathology showing tectal glioma.     His virtual neurologic examination is intact today.  He tells me he is to have an MRI of the brain as a follow-up for monitoring of the glioma.  I did not see this ordered.  I will reach out to his neurosurgeon to clarify this.  -If an MRI of the brain is recommended, this would also evaluate for any signs of increased intracranial pressure or structural changes that may be contributing to his posterior head pain and tinnitus.  I think it is also possible that his tinnitus is related to his hearing loss, which was recently evaluated by  ENT.    Otherwise, we discussed symptomatic management of his posterior pain.  There may very well be a musculoskeletal component, as he describes significant clenching and muscle tension and spasm in his neck.  After discussion of several options, we decided to move forward with a trial of amitriptyline.  -I recommend amitriptyline starting at 10 mg nightly and titrating it by 10 mg each week as needed and tolerated, to a goal dose of 50 mg at night.  Potential side effects were discussed.  -If amitriptyline is not tolerated or not effective after an adequate trial, gabapentin, occipital nerve blocks, or consultation with physical medicine and rehabilitation for further consideration of trigger point injections, botulinum toxin injections, etc. could be considered.  -He is taking a supplement, which includes magnesium.  There is some evidence of magnesium helping headache, and I think this is unlikely to cause harm.    I will to see him back in 2-3 months to monitor his progress.  He will reach out to me via [employer] if there are any questions or concerns.  Either I or his neurosurgeon will update him on the plan for monitoring with MRI.    I spent 70 minutes on patient care and documentation.    Danielle Allen MD  Neurology            Chief Complaint:     Chief Complaint   Patient presents with     Consult     Video Visit New- Glioma           History is obtained from the patient and medical record.  Patient was seen via a virtual visit in their home due to the Covid 19 global pandemic.      Balwinder Perez is a 35 year old male who has been living with headaches since summer 2020.     He noticed an issue with his right neck. This led to a scan revealing SCM inflammation. He did PT and exercises; this was not helpful. He developed tension headaches, which consisted of cramping pain by his right temple. He also had vertigo associated, which become severe. He also had visual disturbance, described as blurred vision in  the right eye.     He then had an MRI of the brain and cervical spine, which showed pineal cyst. He had surgery on December 23rd, 2020. After surgery, he had improvement in vertigo and vision. His headaches continue, but are improved. He no longer needs to take ibuprofen or Tylenol daily.    Currently, he describes pain in the back of his head. This is a tightness feeling. He feels a tight area on the left side worse than the right side. This rates 8-9/10, or lowers to 5-6/10. This is daily and constant. It is better with standing or walking around. It is worse with sitting; he attributes this to his posture. Laying down is ok. He does not wake from pain.     He describes phonophobia, denies photophobia. He denies nausea and vomiting. He denies changes in vision, numbness. He denies unilateral autonomic features. He denies fevers or other illness.     He also has tinnitus. This is a constant high pitched sound in both ears. He was seen in ENT, which found some right sided hearing loss.     He is doing PT, vestibular therapy for Epley maneuver. He tries to do neck exercises.     When the pandemic started, he noted new anxiety. With this he noticed breathing changes, clenching his teeth. He is planning to discuss his jaw with his dentist.    He is trying not to take Tylenol or ibuprofen anymore. He is doing PT exercises through an cristhian, takes walks, gets massages. He tried CBD oil before bed, which helps calm him.     He has oxycodone (stopped a month after surgery) and muscle relaxers (not taking recently).    He previously took melatonin 3 mg on one occasion. He has a prescription for gabapentin, flexeril, sertraline. He took sertraline for a week out of concern it was causing headache. He did not take gabapentin.             Past Medical History:     Past Medical History:   Diagnosis Date     Acquired obstructive hydrocephalus (H) 12/01/2020     Anxiety      CARDIOVASCULAR SCREENING; LDL GOAL LESS THAN 160  2013     Cyst of pineal gland 2020              Past Surgical History:     Past Surgical History:   Procedure Laterality Date     OPTICAL TRACKING SYSTEM VENTRICULOSTOMY Right 2020    Procedure: stealth assisted fenstration of cyst;  Surgeon: Jordin Graves MD;  Location: [facility] OR     [place] teeth extraction               Social History:     Social History     Socioeconomic History     Marital status:      Spouse name: Not on file     Number of children: Not on file     Years of education: Not on file     Highest education level: Not on file   Occupational History     Not on file   Social Needs     Financial resource strain: Not on file     Food insecurity     Worry: Not on file     Inability: Not on file     Transportation needs     Medical: Not on file     Non-medical: Not on file   Tobacco Use     Smoking status: Former Smoker     Packs/day: 0.00     Years: 0.00     Pack years: 0.00     Quit date: 2012     Years since quittin.2     Smokeless tobacco: Never Used   Substance and Sexual Activity     Alcohol use: Not Currently     Drug use: Never     Sexual activity: Yes     Partners: Female   Lifestyle     Physical activity     Days per week: Not on file     Minutes per session: Not on file     Stress: Not on file   Relationships     Social connections     Talks on phone: Not on file     Gets together: Not on file     Attends [employer] service: Not on file     Active member of club or organization: Not on file     Attends meetings of clubs or organizations: Not on file     Relationship status: Not on file     Intimate partner violence     Fear of current or ex partner: Not on file     Emotionally abused: Not on file     Physically abused: Not on file     Forced sexual activity: Not on file   Other Topics Concern     Parent/sibling w/ CABG, MI or angioplasty before 65F 55M? No   Social History Narrative     Not on file             Family History:     Family History   Problem Relation  Age of Onset     Thyroid Disease Mother      Cancer Father         lung cancer-passed away     Other Cancer Father         Lung     Asthma Brother      C.A.D. No family hx of      Diabetes No family hx of      Hypertension No family hx of      Cerebrovascular Disease No family hx of              Allergies:    No Known Allergies          Medications:     Current Outpatient Medications:      amitriptyline (ELAVIL) 10 MG tablet, Take 1 tablet nightly for week 1. Take 2 tablets nightly for week 2. Take 3 tablets nightly for week 3. Take 4 tablets nightly for week 4, Disp: 70 tablet, Rfl: 0     amitriptyline (ELAVIL) 50 MG tablet, Take 1 tablet (50 mg) by mouth At Bedtime, Disp: 30 tablet, Rfl: 3     cyclobenzaprine (FLEXERIL) 10 MG tablet, , Disp: , Rfl:      gabapentin (NEURONTIN) 100 MG capsule, Take 1 capsule (100 mg) by mouth 3 times daily, Disp: 100 capsule, Rfl: 11     ibuprofen 200 MG capsule, Take 400 mg by mouth every 4 hours as needed for fever, Disp: 40 capsule, Rfl: 0     multivitamin, therapeutic with minerals (MULTI-VITAMIN) TABS, Take 1 tablet by mouth daily, Disp: , Rfl:           Physical Exam:   There were no vitals taken for this visit.   Physical Exam:   General: NAD  Neurologic:   Mental Status Exam: Alert, awake and oriented to situation. No dysarthria. Speech of normal fluency.   Cranial Nerves: EOMs intact, no nystagmus, facial movements symmetric, hearing intact to conversation, tongue midline and fully mobile. No tongue atrophy or fasciculations.    Motor: No drift in upper extremities. Able to stand from a seated position without use of arms. No tremors or abnormal movements noted.   Coordination: With arms outstretched, able to touch nose using index finger accurately bilaterally.  Normal finger tapping bilaterally.     Gait: Normal stance and casual gait.    Neck: Normal range of motion with lateral head movements and neck flexion.  Eyes: No conjunctival injection, no scleral icterus.            Data:     MRI brain (11/25/2020):  Simple pineal cyst, which narrows the entrance to the  cerebral aqueduct to less than 1 mm. While there is no donald  cardiomegaly at this time, if this cyst were to enlarge, rapid  hydrocephalus can develop. Consider continued MRI follow-up with  similar high-resolution sequences.    CT head (12/23/2020):  Postsurgical changes of endoscopic pineal gland cyst fenestration and  third ventriculostomy.     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      Sincerely,    Danielle Allen MD

## 2021-03-03 NOTE — PROGRESS NOTES
Balwinder is a 35 year old who is being evaluated via a billable video visit.      How would you like to obtain your AVS? MyChart  If the video visit is dropped, the invitation should be resent by: Send to e-mail at: [email or web address]  Will anyone else be joining your video visit? No        Video-Visit Details    Type of service:  Video Visit    Originating Location (pt. Location): Home    Distant Location (provider location):  [facility] NEUROLOGY [facility]     Platform used for Video Visit: VIKI Lazaro    [facility]  Virtual Neurology Consult     Balwinder Perez MRN# [id number]   YOB: 1985 Age: 35 year old     Requesting physician: Jordin Graves MD         Assessment and Recommendations:     Balwinder Perez is a 35 year old male who presents for further evaluation of headache.    His headache presentation is consistent with new daily persistent headaches, or possible occipital neuralgia bilaterally, following fenestration of a pineal cyst, with final pathology showing tectal glioma.     His virtual neurologic examination is intact today.  He tells me he is to have an MRI of the brain as a follow-up for monitoring of the glioma.  I did not see this ordered.  I will reach out to his neurosurgeon to clarify this.  -If an MRI of the brain is recommended, this would also evaluate for any signs of increased intracranial pressure or structural changes that may be contributing to his posterior head pain and tinnitus.  I think it is also possible that his tinnitus is related to his hearing loss, which was recently evaluated by ENT.    Otherwise, we discussed symptomatic management of his posterior pain.  There may very well be a musculoskeletal component, as he describes significant clenching and muscle tension and spasm in his neck.  After discussion of several options, we decided to move forward with a trial of  amitriptyline.  -I recommend amitriptyline starting at 10 mg nightly and titrating it by 10 mg each week as needed and tolerated, to a goal dose of 50 mg at night.  Potential side effects were discussed.  -If amitriptyline is not tolerated or not effective after an adequate trial, gabapentin, occipital nerve blocks, or consultation with physical medicine and rehabilitation for further consideration of trigger point injections, botulinum toxin injections, etc. could be considered.  -He is taking a supplement, which includes magnesium.  There is some evidence of magnesium helping headache, and I think this is unlikely to cause harm.    I will to see him back in 2-3 months to monitor his progress.  He will reach out to me via [employer] if there are any questions or concerns.  Either I or his neurosurgeon will update him on the plan for monitoring with MRI.    I spent 70 minutes on patient care and documentation.    Danielle Allen MD  Neurology            Chief Complaint:     Chief Complaint   Patient presents with     Consult     Video Visit New- Glioma           History is obtained from the patient and medical record.  Patient was seen via a virtual visit in their home due to the Covid 19 global pandemic.      Balwinder Perez is a 35 year old male who has been living with headaches since summer 2020.     He noticed an issue with his right neck. This led to a scan revealing SCM inflammation. He did PT and exercises; this was not helpful. He developed tension headaches, which consisted of cramping pain by his right temple. He also had vertigo associated, which become severe. He also had visual disturbance, described as blurred vision in the right eye.     He then had an MRI of the brain and cervical spine, which showed pineal cyst. He had surgery on December 23rd, 2020. After surgery, he had improvement in vertigo and vision. His headaches continue, but are improved. He no longer needs to take ibuprofen or Tylenol  daily.    Currently, he describes pain in the back of his head. This is a tightness feeling. He feels a tight area on the left side worse than the right side. This rates 8-9/10, or lowers to 5-6/10. This is daily and constant. It is better with standing or walking around. It is worse with sitting; he attributes this to his posture. Laying down is ok. He does not wake from pain.     He describes phonophobia, denies photophobia. He denies nausea and vomiting. He denies changes in vision, numbness. He denies unilateral autonomic features. He denies fevers or other illness.     He also has tinnitus. This is a constant high pitched sound in both ears. He was seen in ENT, which found some right sided hearing loss.     He is doing PT, vestibular therapy for Epley maneuver. He tries to do neck exercises.     When the pandemic started, he noted new anxiety. With this he noticed breathing changes, clenching his teeth. He is planning to discuss his jaw with his dentist.    He is trying not to take Tylenol or ibuprofen anymore. He is doing PT exercises through an cristhian, takes walks, gets massages. He tried CBD oil before bed, which helps calm him.     He has oxycodone (stopped a month after surgery) and muscle relaxers (not taking recently).    He previously took melatonin 3 mg on one occasion. He has a prescription for gabapentin, flexeril, sertraline. He took sertraline for a week out of concern it was causing headache. He did not take gabapentin.             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              Past Surgical History:     Past Surgical History:   Procedure Laterality Date     OPTICAL TRACKING SYSTEM VENTRICULOSTOMY Right 12/23/2020    Procedure: stealth assisted fenstration of cyst;  Surgeon: Jordin Graves MD;  Location:  [facility] OR     [place] teeth extraction               Social History:     Social History     Socioeconomic History     Marital status:      Spouse name: Not on file     Number of children: Not on file     Years of education: Not on file     Highest education level: Not on file   Occupational History     Not on file   Social Needs     Financial resource strain: Not on file     Food insecurity     Worry: Not on file     Inability: Not on file     Transportation needs     Medical: Not on file     Non-medical: Not on file   Tobacco Use     Smoking status: Former Smoker     Packs/day: 0.00     Years: 0.00     Pack years: 0.00     Quit date: 2012     Years since quittin.2     Smokeless tobacco: Never Used   Substance and Sexual Activity     Alcohol use: Not Currently     Drug use: Never     Sexual activity: Yes     Partners: Female   Lifestyle     Physical activity     Days per week: Not on file     Minutes per session: Not on file     Stress: Not on file   Relationships     Social connections     Talks on phone: Not on file     Gets together: Not on file     Attends Gnosticist service: Not on file     Active member of club or organization: Not on file     Attends meetings of clubs or organizations: Not on file     Relationship status: Not on file     Intimate partner violence     Fear of current or ex partner: Not on file     Emotionally abused: Not on file     Physically abused: Not on file     Forced sexual activity: Not on file   Other Topics Concern     Parent/sibling w/ CABG, MI or angioplasty before 65F 55M? No   Social History Narrative     Not on file             Family History:     Family History   Problem Relation Age of Onset     Thyroid Disease Mother      Cancer Father         lung cancer-passed away     Other Cancer Father         Lung     Asthma Brother      C.A.D. No family hx of      Diabetes No family hx of      Hypertension No family hx of      Cerebrovascular Disease No family hx of               Allergies:    No Known Allergies          Medications:     Current Outpatient Medications:      amitriptyline (ELAVIL) 10 MG tablet, Take 1 tablet nightly for week 1. Take 2 tablets nightly for week 2. Take 3 tablets nightly for week 3. Take 4 tablets nightly for week 4, Disp: 70 tablet, Rfl: 0     amitriptyline (ELAVIL) 50 MG tablet, Take 1 tablet (50 mg) by mouth At Bedtime, Disp: 30 tablet, Rfl: 3     cyclobenzaprine (FLEXERIL) 10 MG tablet, , Disp: , Rfl:      gabapentin (NEURONTIN) 100 MG capsule, Take 1 capsule (100 mg) by mouth 3 times daily, Disp: 100 capsule, Rfl: 11     ibuprofen 200 MG capsule, Take 400 mg by mouth every 4 hours as needed for fever, Disp: 40 capsule, Rfl: 0     multivitamin, therapeutic with minerals (MULTI-VITAMIN) TABS, Take 1 tablet by mouth daily, Disp: , Rfl:           Physical Exam:   There were no vitals taken for this visit.   Physical Exam:   General: NAD  Neurologic:   Mental Status Exam: Alert, awake and oriented to situation. No dysarthria. Speech of normal fluency.   Cranial Nerves: EOMs intact, no nystagmus, facial movements symmetric, hearing intact to conversation, tongue midline and fully mobile. No tongue atrophy or fasciculations.    Motor: No drift in upper extremities. Able to stand from a seated position without use of arms. No tremors or abnormal movements noted.   Coordination: With arms outstretched, able to touch nose using index finger accurately bilaterally.  Normal finger tapping bilaterally.     Gait: Normal stance and casual gait.    Neck: Normal range of motion with lateral head movements and neck flexion.  Eyes: No conjunctival injection, no scleral icterus.           Data:     MRI brain (11/25/2020):  Simple pineal cyst, which narrows the entrance to the  cerebral aqueduct to less than 1 mm. While there is no donald  cardiomegaly at this time, if this cyst were to enlarge, rapid  hydrocephalus can develop. Consider continued MRI follow-up with  similar  high-resolution sequences.    CT head (12/23/2020):  Postsurgical changes of endoscopic pineal gland cyst fenestration and  third ventriculostomy.     Final pathology result of pineal region cyst shows gliotic tissue consistent with tectal glioma

## 2021-03-12 DIAGNOSIS — E34.8 CYST OF PINEAL GLAND: Primary | ICD-10-CM

## 2021-04-18 ENCOUNTER — HEALTH MAINTENANCE LETTER (OUTPATIENT)
Age: 36
End: 2021-04-18

## 2021-04-20 DIAGNOSIS — M54.81 BILATERAL OCCIPITAL NEURALGIA: ICD-10-CM

## 2021-04-20 DIAGNOSIS — G44.52 NEW DAILY PERSISTENT HEADACHE: Primary | ICD-10-CM

## 2021-04-20 DIAGNOSIS — M54.2 NECK PAIN: ICD-10-CM

## 2021-04-28 ENCOUNTER — ALLIED HEALTH/NURSE VISIT (OUTPATIENT)
Dept: PHYSICAL MEDICINE AND REHAB | Facility: CLINIC | Age: 36
End: 2021-04-28
Payer: COMMERCIAL

## 2021-04-28 DIAGNOSIS — G43.719 CHRONIC MIGRAINE WITHOUT AURA, INTRACTABLE, WITHOUT STATUS MIGRAINOSUS: ICD-10-CM

## 2021-04-28 DIAGNOSIS — M54.81 BILATERAL OCCIPITAL NEURALGIA: ICD-10-CM

## 2021-04-28 DIAGNOSIS — G24.4 IDIOPATHIC OROFACIAL DYSTONIA: Primary | ICD-10-CM

## 2021-04-28 PROCEDURE — 99204 OFFICE O/P NEW MOD 45 MIN: CPT | Mod: 25 | Performed by: PHYSICAL MEDICINE & REHABILITATION

## 2021-04-28 PROCEDURE — 64405 NJX AA&/STRD GR OCPL NRV: CPT | Mod: 50 | Performed by: PHYSICAL MEDICINE & REHABILITATION

## 2021-04-28 NOTE — PROGRESS NOTES
[facility]  Cl[facility]ics & [facility] Center  Physical Medicine & Rehabilitation Consultation    Balwinder Perez   YOB: 1985  MRN: [id number]   Date of Service: 04/28/21    Requesting Provider: MD Cristiano Demarco [facility] neurology      History of Present Illness:  Balwinder Perez is a 35 year old male who was referred to clinic for consideration of occipital nerve blocks and/or trigger point injections for headaches.      His headaches started rather abruptly in summer 2020 with pain in the posterior right side of his head.  His headaches then evolved into tension-type headaches involving bilateral temporalis muscles.  MRI brain revealed a pineal cyst, and he subsequently underwent fenestration on 12/23/2020 due to obstructive hydrocephalus as result of the cyst (final pathology showed tectal glioma), with subsequent resolution of many of the associated symptoms including nausea, visual disturbance, and vertigo.  Unfortunately, his pain has evolved to involve more of his posterior occiput bilaterally as well as bilateral temples.  Additionally, he has constant high-pitched tinnitus.    Currently, he reports 30/30 headache days per month.  He describes his pain as tightness, with a very tender sensation in the posterior occiput.  His pain is typically a 5-6/10 but may be as severe as a 8-9/10 at night.  Laying supine and sitting for long periods of time make his symptoms worse.  Additionally, any form of exercise or physical activit putting makes his pain worse.  He has tried applying heat/cold to the affected area, lidocaine ointment, CBD oil, massage therapy, and PT with minimal benefit.  He is on amitriptyline, gabapentin and magnesium currently with very modest benefit.    Additionally, the patient reports associated jaw clenching and tension in the affected jaw musculature.  He reports that this is worse with anxiety and high level of stress.  He was referred to a dentist  in 2020 who prescribed him a custom mouthguard, but he ultimately decided not to go through with having this made.  He is not aware of any excessive wear and tear on his teeth.      Past Medical History:   Diagnosis Date     Acquired obstructive hydrocephalus (H) 2020     Anxiety      CARDIOVASCULAR SCREENING; LDL GOAL LESS THAN 160 2013     Cyst of pineal gland 2020        Family History   Problem Relation Age of Onset     Thyroid Disease Mother      Cancer Father         lung cancer-passed away     Other Cancer Father         Lung     Asthma Brother      C.A.D. No family hx of      Diabetes No family hx of      Hypertension No family hx of      Cerebrovascular Disease No family hx of        Social History     Socioeconomic History     Marital status:      Spouse name: Not on file     Number of children: Not on file     Years of education: Not on file     Highest education level: Not on file   Occupational History     Not on file   Social Needs     Financial resource strain: Not on file     Food insecurity     Worry: Not on file     Inability: Not on file     Transportation needs     Medical: Not on file     Non-medical: Not on file   Tobacco Use     Smoking status: Former Smoker     Packs/day: 0.00     Years: 0.00     Pack years: 0.00     Quit date: 2012     Years since quittin.4     Smokeless tobacco: Never Used   Substance and Sexual Activity     Alcohol use: Not Currently     Drug use: Never     Sexual activity: Yes     Partners: Female   Lifestyle     Physical activity     Days per week: Not on file     Minutes per session: Not on file     Stress: Not on file   Relationships     Social connections     Talks on phone: Not on file     Gets together: Not on file     Attends Hoahaoism service: Not on file     Active member of club or organization: Not on file     Attends meetings of clubs or organizations: Not on file     Relationship status: Not on file     Intimate partner  violence     Fear of current or ex partner: Not on file     Emotionally abused: Not on file     Physically abused: Not on file     Forced sexual activity: Not on file   Other Topics Concern     Parent/sibling w/ CABG, MI or angioplasty before 65F 55M? No   Social History Narrative     Not on file       Current Outpatient Medications   Medication Sig Dispense Refill     amitriptyline (ELAVIL) 10 MG tablet Take 1 tablet nightly for week 1. Take 2 tablets nightly for week 2. Take 3 tablets nightly for week 3. Take 4 tablets nightly for week 4 70 tablet 0     amitriptyline (ELAVIL) 50 MG tablet Take 1 tablet (50 mg) by mouth At Bedtime 30 tablet 3     cyclobenzaprine (FLEXERIL) 10 MG tablet        gabapentin (NEURONTIN) 100 MG capsule Take 1 capsule (100 mg) by mouth 3 times daily 100 capsule 11     ibuprofen 200 MG capsule Take 400 mg by mouth every 4 hours as needed for fever 40 capsule 0     multivitamin, therapeutic with minerals (MULTI-VITAMIN) TABS Take 1 tablet by mouth daily           No Known Allergies      Physical Examination:  VS: There were no vitals taken for this visit.     Pleasant and cooperative, in no acute distress  No lesions or abrasions on exposed skin  Significant tenderness over bilateral occipital protuberances    Procedure:     Drug:   Bupivacaine 0.5%: 7 ml (Lot: [id number], Exp: 2023, ND: 72407-741-31)  Lidocaine 1%: 2 ml: (Lot: [id number], Exp: 2024, NDC: 76185-848-47)  [place] mg = 1 ml (Lot: [id number], Exp: 10/2022, NDC: [address]-3)    Prior to the start of the procedure and with procedural staff participation, I verbally confirmed the patient s identity using two indicators, relevant allergies, that the procedure was appropriate and matched the consent or emergent situation, and that the correct equipment/implants were available. Immediately prior to starting the procedure I conducted the Time Out with the procedural staff and re-confirmed the patient s name, procedure, and  site/side. (The Joint Commission universal protocol was followed.)  Yes    Sedation (Moderate or Deep): None      Right and Left greater occipital nerve block    Area just inferior to insertion of the right and left superior trapezius insertion onto skull was cleansed with ChloraPrep. Needle was advanced anteriorly to base of skull then slightly withdrawn and injectate was injected in a fan-like distribution at different depths. Total of bupivacaine/lidocaine/kenalog mixture was 5 ml per side.      Balwinder Perez tolerated the procedure well without any immediate complications. He was allowed to recover for an appropriate period of time and was discharged home in stable condition.  Patient will follow-up regarding response to this procedure.      Assessment & Recommendations:  Balwinder Perez is a 35 year old male who presents to rehabilitation clinic for bilateral occipital nerve blocks and/or trigger point injections for management of chronic occipital headache most consistent with occipital neuralgia.  Balwinder Perez tolerated the procedure well and noted reduction in baseline pain following the procedure.     Patient was also evaluated for possible botulinum toxin injections for management of his symptoms.  He does not meet criteria for Botox for migraine, as he has not tried at least 3 different medications from 3 different classes, but due to his excessive clenching and teeth grinding, he would be a good candidate for botulinum toxin injections into his masticatory muscles for management of bruxism, which may be a contributor to his chronic headaches.    We decided to move forward with occipital nerve blocks today, and will consider asking his insurer for Botox to address his bruxism if these procedures are not effective today.  Follow-up as needed.    Thank you for this consultation. Please do not hesitate to contact me with further questions.   Marychuy Ley MD  Physical Medicine and  Rehabilitation  [phone]      I spent a total of 45 minutes, face-to-face and managing the care of Balwinder Perez, excluding the procedure. Over 50% of my time was spent counseling the patient and coordinating care. Please see note for details.

## 2021-04-29 RX ORDER — BUPIVACAINE HYDROCHLORIDE 5 MG/ML
7 INJECTION, SOLUTION EPIDURAL; INTRACAUDAL ONCE
Status: COMPLETED | OUTPATIENT
Start: 2021-04-29 | End: 2021-04-29

## 2021-04-29 RX ORDER — TRIAMCINOLONE ACETONIDE 40 MG/ML
40 INJECTION, SUSPENSION INTRA-ARTICULAR; INTRAMUSCULAR ONCE
Status: COMPLETED | OUTPATIENT
Start: 2021-04-29 | End: 2021-04-29

## 2021-04-29 RX ADMIN — TRIAMCINOLONE ACETONIDE 40 MG: 40 INJECTION, SUSPENSION INTRA-ARTICULAR; INTRAMUSCULAR at 17:46

## 2021-04-29 RX ADMIN — BUPIVACAINE HYDROCHLORIDE 35 MG: 5 INJECTION, SOLUTION EPIDURAL; INTRACAUDAL at 17:46

## 2021-07-13 ENCOUNTER — ANCILLARY PROCEDURE (OUTPATIENT)
Dept: MRI IMAGING | Facility: CLINIC | Age: 36
End: 2021-07-13
Attending: NEUROLOGICAL SURGERY
Payer: COMMERCIAL

## 2021-07-13 ENCOUNTER — OFFICE VISIT (OUTPATIENT)
Dept: NEUROSURGERY | Facility: CLINIC | Age: 36
End: 2021-07-13
Payer: COMMERCIAL

## 2021-07-13 VITALS
OXYGEN SATURATION: 100 % | HEIGHT: 68 IN | RESPIRATION RATE: 15 BRPM | DIASTOLIC BLOOD PRESSURE: 78 MMHG | WEIGHT: 177 LBS | HEART RATE: 80 BPM | BODY MASS INDEX: 26.83 KG/M2 | SYSTOLIC BLOOD PRESSURE: 124 MMHG

## 2021-07-13 DIAGNOSIS — E34.8 CYST OF PINEAL GLAND: Primary | ICD-10-CM

## 2021-07-13 DIAGNOSIS — E34.8 CYST OF PINEAL GLAND: ICD-10-CM

## 2021-07-13 DIAGNOSIS — G91.1 ACQUIRED OBSTRUCTIVE HYDROCEPHALUS (H): ICD-10-CM

## 2021-07-13 PROCEDURE — 70553 MRI BRAIN STEM W/O & W/DYE: CPT | Performed by: STUDENT IN AN ORGANIZED HEALTH CARE EDUCATION/TRAINING PROGRAM

## 2021-07-13 PROCEDURE — 99213 OFFICE O/P EST LOW 20 MIN: CPT | Performed by: NEUROLOGICAL SURGERY

## 2021-07-13 PROCEDURE — A9585 GADOBUTROL INJECTION: HCPCS | Performed by: STUDENT IN AN ORGANIZED HEALTH CARE EDUCATION/TRAINING PROGRAM

## 2021-07-13 RX ORDER — GADOBUTROL 604.72 MG/ML
7.5 INJECTION INTRAVENOUS ONCE
Status: COMPLETED | OUTPATIENT
Start: 2021-07-13 | End: 2021-07-13

## 2021-07-13 RX ADMIN — GADOBUTROL 7.5 ML: 604.72 INJECTION INTRAVENOUS at 12:31

## 2021-07-13 ASSESSMENT — MIFFLIN-ST. JEOR: SCORE: 1712.37

## 2021-07-13 ASSESSMENT — PAIN SCALES - GENERAL: PAINLEVEL: MODERATE PAIN (5)

## 2021-07-13 NOTE — LETTER
"7/13/2021       RE: Balwinder Perez  [address] [address]     Dear Colleague,    Thank you for referring your patient, Balwinder Perez, to the [facility] NEUROSURGERY CLINIC [place] [facility]. Please see a copy of my visit note below.    Neurosurgery Clinic Note    Reason for Visit: follow up    History of Present Illness  Balwinder Perez is a 35 year old gentleman with a pineal cyst now 6 months s/p endoscopic fenestration and biopsy. The biopsy showed gliosis and he returns today for routine follow up.    Overall, he tells me that he is doing much better. He no longer has his preoperative symptoms of nausea, vertigo, or his old pressure type headache. He continues to suffer some from some ringing of the ears (R>L) and a different type of occipital headache. He has been seeing Dr. Allen who recommended some injections that did provide some relief, albeit short lived. He just started taking amitriptyline at night and his sleep has overall improved since the last time I saw him. He states, \"Everything is better. 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     Otherwise, he denies any other new symptoms except recently diagnosed astigmatism for which he will start wearing glasses.     No Known Allergies    Current Outpatient Medications   Medication     amitriptyline (ELAVIL) 50 MG tablet     multivitamin, therapeutic with minerals (MULTI-VITAMIN) TABS     amitriptyline (ELAVIL) 10 MG tablet     No current facility-administered medications for this visit.       Physical Exam  /78   Pulse 80   Resp 15   Ht 1.727 m (5' 8\")   Wt 80.3 kg (177 lb)   SpO2 100%   BMI 26.91 kg/m        General: Awake, alert, oriented. Well nourished, well developed, no acute distress.  HEENT: Head normocephalic, atraumatic. " Incisions well healed and barely visible.     Neurological  Awake, alert and oriented to date, time, place and person. Speech fluent.   Pupils equal, round, reactive to light.  Extraocular movement intact.  Hearing is grossly normal to finger rub.   Facial sensation intact.  Face symmetric.  Tongue midline.  Uvula elevates equally.    Motor: full strength throughout.  Sensation: intact to light touch and pinpoint.  Gait: normal    ROS: 10 point ROS neg other than the symptoms noted above in the HPI.        Imaging: my interpretations only  MRI 7/13/2021: significant artifact from csf pulsation but overall his cyst looks smaller. Pending final read from radiology        Assessment and Plan   Balwinder Perez is a 35 year old male with stable to smaller pineal cyst who seems to be doing quite well with improving health. He is actively pursuing treatment for other symptoms and has gotten back to physical activity by playing soccer. At this point, it will be important to obtain surveillance imaging to make sure the cyst remains stable in size.    Follow up in 1 year with repeat MRI of the brain with and without contrast.      Jordin Graves MD  Department of Neurosurgery  [facility]        Again, thank you for allowing me to participate in the care of your patient.      Sincerely,    Jordin Graves MD

## 2021-07-13 NOTE — PROGRESS NOTES
"Neurosurgery Clinic Note    Reason for Visit: follow up    History of Present Illness  Balwinder Perez is a 35 year old gentleman with a pineal cyst now 6 months s/p endoscopic fenestration and biopsy. The biopsy showed gliosis and he returns today for routine follow up.    Overall, he tells me that he is doing much better. He no longer has his preoperative symptoms of nausea, vertigo, or his old pressure type headache. He continues to suffer some from some ringing of the ears (R>L) and a different type of occipital headache. He has been seeing Dr. Allen who recommended some injections that did provide some relief, albeit short lived. He just started taking amitriptyline at night and his sleep has overall improved since the last time I saw him. He states, \"Everything is better. 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     Otherwise, he denies any other new symptoms except recently diagnosed astigmatism for which he will start wearing glasses.     No Known Allergies    Current Outpatient Medications   Medication     amitriptyline (ELAVIL) 50 MG tablet     multivitamin, therapeutic with minerals (MULTI-VITAMIN) TABS     amitriptyline (ELAVIL) 10 MG tablet     No current facility-administered medications for this visit.       Physical Exam  /78   Pulse 80   Resp 15   Ht 1.727 m (5' 8\")   Wt 80.3 kg (177 lb)   SpO2 100%   BMI 26.91 kg/m        General: Awake, alert, oriented. Well nourished, well developed, no acute distress.  HEENT: Head normocephalic, atraumatic. Incisions well healed and barely visible.     Neurological  Awake, alert and oriented to date, time, place and person. Speech fluent.   Pupils equal, round, reactive to light.  Extraocular movement intact.  Hearing is grossly normal to finger rub.   Facial sensation intact.  Face symmetric.  Tongue " midline.  Uvula elevates equally.    Motor: full strength throughout.  Sensation: intact to light touch and pinpoint.  Gait: normal    ROS: 10 point ROS neg other than the symptoms noted above in the HPI.        Imaging: my interpretations only  MRI 7/13/2021: significant artifact from csf pulsation but overall his cyst looks smaller. Pending final read from radiology        Assessment and Plan   Balwinder Perez is a 35 year old male with stable to smaller pineal cyst who seems to be doing quite well with improving health. He is actively pursuing treatment for other symptoms and has gotten back to physical activity by playing soccer. At this point, it will be important to obtain surveillance imaging to make sure the cyst remains stable in size.    Follow up in 1 year with repeat MRI of the brain with and without contrast.      Jordin Graves MD  Department of Neurosurgery  [facility]

## 2021-07-13 NOTE — NURSING NOTE
Chief Complaint   Patient presents with     RECHECK     [facility] RETURN F/U POST MRI       Ronan Sousa, EMT

## 2021-07-16 ENCOUNTER — OFFICE VISIT (OUTPATIENT)
Dept: FAMILY MEDICINE | Facility: CLINIC | Age: 36
End: 2021-07-16
Payer: COMMERCIAL

## 2021-07-16 VITALS
BODY MASS INDEX: 27.62 KG/M2 | DIASTOLIC BLOOD PRESSURE: 81 MMHG | RESPIRATION RATE: 18 BRPM | HEIGHT: 67 IN | OXYGEN SATURATION: 99 % | TEMPERATURE: 97.7 F | SYSTOLIC BLOOD PRESSURE: 115 MMHG | HEART RATE: 74 BPM | WEIGHT: 176 LBS

## 2021-07-16 DIAGNOSIS — Z13.1 SCREENING FOR DIABETES MELLITUS: ICD-10-CM

## 2021-07-16 DIAGNOSIS — Z13.220 SCREENING FOR HYPERLIPIDEMIA: ICD-10-CM

## 2021-07-16 DIAGNOSIS — Z11.59 NEED FOR HEPATITIS C SCREENING TEST: ICD-10-CM

## 2021-07-16 DIAGNOSIS — Z00.00 ROUTINE GENERAL MEDICAL EXAMINATION AT A HEALTH CARE FACILITY: Primary | ICD-10-CM

## 2021-07-16 DIAGNOSIS — G44.52 NEW DAILY PERSISTENT HEADACHE: ICD-10-CM

## 2021-07-16 DIAGNOSIS — Z11.4 ENCOUNTER FOR SCREENING FOR HIV: ICD-10-CM

## 2021-07-16 DIAGNOSIS — R13.10 DYSPHAGIA, UNSPECIFIED TYPE: ICD-10-CM

## 2021-07-16 PROCEDURE — 87389 HIV-1 AG W/HIV-1&-2 AB AG IA: CPT | Performed by: FAMILY MEDICINE

## 2021-07-16 PROCEDURE — 86803 HEPATITIS C AB TEST: CPT | Performed by: FAMILY MEDICINE

## 2021-07-16 PROCEDURE — 36415 COLL VENOUS BLD VENIPUNCTURE: CPT | Performed by: FAMILY MEDICINE

## 2021-07-16 PROCEDURE — 84443 ASSAY THYROID STIM HORMONE: CPT | Performed by: FAMILY MEDICINE

## 2021-07-16 PROCEDURE — 82947 ASSAY GLUCOSE BLOOD QUANT: CPT | Performed by: FAMILY MEDICINE

## 2021-07-16 PROCEDURE — 99395 PREV VISIT EST AGE 18-39: CPT | Performed by: FAMILY MEDICINE

## 2021-07-16 PROCEDURE — 80061 LIPID PANEL: CPT | Performed by: FAMILY MEDICINE

## 2021-07-16 RX ORDER — AMITRIPTYLINE HYDROCHLORIDE 50 MG/1
50 TABLET ORAL AT BEDTIME
Qty: 30 TABLET | Refills: 3 | Status: SHIPPED | OUTPATIENT
Start: 2021-07-16 | End: 2022-07-12

## 2021-07-16 ASSESSMENT — ENCOUNTER SYMPTOMS
CONSTIPATION: 0
HEADACHES: 0
HEMATOLOGIC/LYMPHATIC NEGATIVE: 1
JOINT SWELLING: 0
CHILLS: 0
ABDOMINAL PAIN: 0
DIARRHEA: 0
SHORTNESS OF BREATH: 0
SORE THROAT: 0
NERVOUS/ANXIOUS: 0
EYE PAIN: 0
PARESTHESIAS: 0
ARTHRALGIAS: 0
WEAKNESS: 0
DIZZINESS: 1
COUGH: 0
PALPITATIONS: 0
HEMATOCHEZIA: 0
NAUSEA: 0
MYALGIAS: 0
FREQUENCY: 0
ENDOCRINE NEGATIVE: 1
FEVER: 0
HEARTBURN: 0
HEMATURIA: 0
DYSURIA: 0
ALLERGIC/IMMUNOLOGIC NEGATIVE: 1

## 2021-07-16 ASSESSMENT — PAIN SCALES - GENERAL: PAINLEVEL: NO PAIN (0)

## 2021-07-16 ASSESSMENT — MIFFLIN-ST. JEOR: SCORE: 1691.96

## 2021-07-16 NOTE — PATIENT INSTRUCTIONS
[facility]: Physical Medicine and Rehabilitation Clinic [facility] [phone]- Dr. Ley     Preventive Health Recommendations  Male Ages 26 - 39    Yearly exam:             See your health care provider every year in order to  o   Review health changes.   o   Discuss preventive care.    o   Review your medicines if your doctor has prescribed any.    You should be tested each year for STDs (sexually transmitted diseases), if you re at risk.     After age 35, talk to your provider about cholesterol testing. If you are at risk for heart disease, have your cholesterol tested at least every 5 years.     If you are at risk for diabetes, you should have a diabetes test (fasting glucose).  Shots: Get a flu shot each year. Get a tetanus shot every 10 years.     Nutrition:    Eat at least 5 servings of fruits and vegetables daily.     Eat whole-grain bread, whole-wheat pasta and brown rice instead of white grains and rice.     Get adequate Calcium and Vitamin D.     Lifestyle    Exercise for at least 150 minutes a week (30 minutes a day, 5 days a week). This will help you control your weight and prevent disease.     Limit alcohol to one drink per day.     No smoking.     Wear sunscreen to prevent skin cancer.     See your dentist every six months for an exam and cleaning.

## 2021-07-16 NOTE — PROGRESS NOTES
SUBJECTIVE:   CC: Balwinder Perez is an 35 year old male who presents for preventative health visit.     Patient has been advised of split billing requirements and indicates understanding: Yes  Healthy Habits:     Getting at least 3 servings of Calcium per day:  Yes    Bi-annual eye exam:  Yes    Dental care twice a year:  Yes    Sleep apnea or symptoms of sleep apnea:  None    Diet:  Regular (no restrictions)    Frequency of exercise:  1 day/week    Duration of exercise:  30-45 minutes    Taking medications regularly:  Yes    Barriers to taking medications:  None    Medication side effects:  None    PHQ-2 Total Score: 0    Additional concerns today:  Yes    He is worried about his thyroid.  For the past few weeks has felt a choking sensation intermittently.  Sometimes feels like things get stuck when he swallows.  He has gained weight.  No stool changes.  No skin changes.  No temperature issues.  His mom has thyroid disease (not sure if hypo or hyper).      Has a history of occipital headaches and a pineal gland cyst.  Has been seen by neurology and neurosurgery for these issues.  Recently had an occipital nerve block which was helpful, but did not completely resolve his pain.  Taking amitriptyline PRN (doesn't take it regularly since it causes a lot of daytime fatigue).      Today's PHQ-2 Score:   PHQ-2 ( 1999 [employer]) 7/16/2021   Q1: Little interest or pleasure in doing things 0   Q2: Feeling down, depressed or hopeless 0   PHQ-2 Score 0   Q1: Little interest or pleasure in doing things Not at all   Q2: Feeling down, depressed or hopeless Not at all   PHQ-2 Score 0     Abuse: Current or Past(Physical, Sexual or Emotional)- No  Do you feel safe in your environment? Yes    Have you ever done Advance Care Planning? (For example, a Health Directive, POLST, or a discussion with a medical provider or your loved ones about your wishes): No, advance care planning information given to patient to review.  Patient declined  advance care planning discussion at this time.    Social History     Tobacco Use     Smoking status: Former Smoker     Packs/day: 0.00     Years: 0.00     Pack years: 0.00     Quit date: 2012     Years since quittin.6     Smokeless tobacco: Never Used   Substance Use Topics     Alcohol use: Not Currently     If you drink alcohol do you typically have >3 drinks per day or >7 drinks per week? Not applicable    Alcohol Use 2021   Prescreen: >3 drinks/day or >7 drinks/week? Not Applicable   Prescreen: >3 drinks/day or >7 drinks/week? -   No flowsheet data found.    Last PSA: No results found for: PSA    Reviewed orders with patient. Reviewed health maintenance and updated orders accordingly - Yes  Patient Active Problem List   Diagnosis     CARDIOVASCULAR SCREENING; LDL GOAL LESS THAN 160     Neck pain     Acquired obstructive hydrocephalus (H)     Cyst of pineal gland     Past Surgical History:   Procedure Laterality Date     OPTICAL TRACKING SYSTEM VENTRICULOSTOMY Right 2020    Procedure: stealth assisted fenstration of cyst;  Surgeon: Jordin Graves MD;  Location: [facility] OR     [place] teeth extraction         Social History     Tobacco Use     Smoking status: Former Smoker     Packs/day: 0.00     Years: 0.00     Pack years: 0.00     Quit date: 2012     Years since quittin.6     Smokeless tobacco: Never Used   Substance Use Topics     Alcohol use: Not Currently     Family History   Problem Relation Age of Onset     Thyroid Disease Mother      Cancer Father         lung cancer-passed away     Other Cancer Father         Lung     Asthma Brother      C.A.D. No family hx of      Diabetes No family hx of      Hypertension No family hx of      Cerebrovascular Disease No family hx of            Reviewed and updated as needed this visit by clinical staff  Tobacco  Allergies  Meds   Med Hx  Surg Hx  Fam Hx  Soc Hx        Review of Systems   Constitutional: Negative for chills and fever.   HENT:  "Positive for ear pain. Negative for congestion, hearing loss and sore throat.    Eyes: Negative for pain and visual disturbance.   Respiratory: Negative for cough and shortness of breath.    Cardiovascular: Negative for chest pain, palpitations and peripheral edema.   Gastrointestinal: Negative for abdominal pain, constipation, diarrhea, heartburn, hematochezia and nausea.   Endocrine: Negative.    Genitourinary: Negative for dysuria, frequency, genital sores, hematuria and urgency.   Musculoskeletal: Negative for arthralgias, joint swelling and myalgias.   Skin: Negative for rash.   Allergic/Immunologic: Negative.    Neurological: Positive for dizziness. Negative for weakness, headaches and paresthesias.   Hematological: Negative.    Psychiatric/Behavioral: Negative for mood changes. The patient is not nervous/anxious.      OBJECTIVE:   Pulse 74   Temp 97.7  F (36.5  C) (Oral)   Resp 18   Ht 1.702 m (5' 7\")   Wt 79.8 kg (176 lb)   SpO2 99%   BMI 27.57 kg/m      Physical Exam  GENERAL: healthy, alert and no distress  EYES: Eyes grossly normal to inspection, PERRL and conjunctivae and sclerae normal  HENT: ear canals and TM's normal, nose and mouth without ulcers or lesions  NECK: no adenopathy, no asymmetry, masses, or scars and thyroid normal to palpation  RESP: lungs clear to auscultation - no rales, rhonchi or wheezes  CV: regular rate and rhythm, normal S1 S2, no S3 or S4, no murmur, click or rub  ABDOMEN: soft, nontender, no hepatosplenomegaly, no masses  MS: no gross musculoskeletal defects noted, no edema  SKIN: no suspicious lesions or rashes  NEURO: Normal strength and tone, mentation intact and speech normal  PSYCH: mentation appears normal, affect normal/bright    ASSESSMENT/PLAN:   1. Routine general medical examination at a health care facility    2. Dysphagia, unspecified type  - Check TSH.  Thyroid does not feel enlarged on exam   - TSH with free T4 reflex; Future  - TSH with free T4 " "reflex    3. New daily persistent headache  - Continue PRN Elavil at bedtime.  Advised trying to split the pill in half to see if it's still effective but reduces daytime fatigue  - He will follow up with neurology regarding consideration of further nerve block vs Botox therapy   - amitriptyline (ELAVIL) 50 MG tablet; Take 1 tablet (50 mg) by mouth At Bedtime  Dispense: 30 tablet; Refill: 3    4. Need for hepatitis C screening test  - Hepatitis C Screen Reflex to HCV RNA Quant and Genotype; Future  - Hepatitis C Screen Reflex to HCV RNA Quant and Genotype    5. Screening for hyperlipidemia  - Lipid panel reflex to direct LDL Fasting    6. Encounter for screening for HIV  - HIV Antigen Antibody Combo; Future  - HIV Antigen Antibody Combo    7. Screening for diabetes mellitus  - Glucose; Future  - Glucose    Patient has been advised of split billing requirements and indicates understanding: Yes  COUNSELING:   Reviewed preventive health counseling, as reflected in patient instructions    Estimated body mass index is 27.57 kg/m  as calculated from the following:    Height as of this encounter: 1.702 m (5' 7\").    Weight as of this encounter: 79.8 kg (176 lb).     He reports that he quit smoking about 8 years ago. He smoked 0.00 packs per day for 0.00 years. He has never used smokeless tobacco.      Counseling Resources:  ATP IV Guidelines  Pooled Cohorts Equation Calculator  FRAX Risk Assessment  ICSI Preventive Guidelines  Dietary Guidelines for Americans, 2010  USDA's MyPlate  ASA Prophylaxis  Lung CA Screening    Maris Castillo DO  [facility]  "

## 2021-07-17 LAB
CHOLEST SERPL-MCNC: 220 MG/DL
FASTING STATUS PATIENT QL REPORTED: YES
FASTING STATUS PATIENT QL REPORTED: YES
GLUCOSE BLD-MCNC: 86 MG/DL (ref 70–99)
HDLC SERPL-MCNC: 63 MG/DL
LDLC SERPL CALC-MCNC: 128 MG/DL
NONHDLC SERPL-MCNC: 157 MG/DL
TRIGL SERPL-MCNC: 145 MG/DL
TSH SERPL DL<=0.005 MIU/L-ACNC: 3.54 MU/L (ref 0.4–4)

## 2021-07-19 LAB
HCV AB SERPL QL IA: NONREACTIVE
HIV 1+2 AB+HIV1 P24 AG SERPL QL IA: NONREACTIVE

## 2021-10-02 ENCOUNTER — HEALTH MAINTENANCE LETTER (OUTPATIENT)
Age: 36
End: 2021-10-02

## 2021-10-13 ENCOUNTER — OFFICE VISIT (OUTPATIENT)
Dept: FAMILY MEDICINE | Facility: CLINIC | Age: 36
End: 2021-10-13
Payer: COMMERCIAL

## 2021-10-13 VITALS
BODY MASS INDEX: 26.87 KG/M2 | DIASTOLIC BLOOD PRESSURE: 74 MMHG | WEIGHT: 171.2 LBS | HEIGHT: 67 IN | TEMPERATURE: 98.1 F | HEART RATE: 81 BPM | OXYGEN SATURATION: 100 % | SYSTOLIC BLOOD PRESSURE: 114 MMHG

## 2021-10-13 DIAGNOSIS — E34.8 CYST OF PINEAL GLAND: ICD-10-CM

## 2021-10-13 DIAGNOSIS — J01.00 ACUTE NON-RECURRENT MAXILLARY SINUSITIS: Primary | ICD-10-CM

## 2021-10-13 DIAGNOSIS — S16.1XXA STRAIN OF NECK MUSCLE, INITIAL ENCOUNTER: ICD-10-CM

## 2021-10-13 PROCEDURE — 99213 OFFICE O/P EST LOW 20 MIN: CPT | Performed by: FAMILY MEDICINE

## 2021-10-13 RX ORDER — PREDNISONE 20 MG/1
TABLET ORAL
Qty: 10 TABLET | Refills: 0 | Status: SHIPPED | OUTPATIENT
Start: 2021-10-13 | End: 2021-12-07

## 2021-10-13 RX ORDER — DOXYCYCLINE 100 MG/1
100 CAPSULE ORAL 2 TIMES DAILY
Qty: 20 CAPSULE | Refills: 0 | Status: SHIPPED | OUTPATIENT
Start: 2021-10-13 | End: 2021-10-23

## 2021-10-13 ASSESSMENT — PAIN SCALES - GENERAL: PAINLEVEL: EXTREME PAIN (8)

## 2021-10-13 ASSESSMENT — MIFFLIN-ST. JEOR: SCORE: 1665.19

## 2021-10-13 NOTE — PROGRESS NOTES
"    Assessment & Plan     Acute non-recurrent maxillary sinusitis  Supportive care,   May use Flonase or Zyrtec as well  If symptoms do not improve, then likely refer to ENT.    - doxycycline hyclate (VIBRAMYCIN) 100 MG capsule; Take 1 capsule (100 mg) by mouth 2 times daily for 10 days  - predniSONE (DELTASONE) 20 MG tablet; 2 tab daily for 5 days    Strain of neck muscle, initial encounter  Home exercises,   Heat and massage therapy.  - predniSONE (DELTASONE) 20 MG tablet; 2 tab daily for 5 days    Cyst of pineal gland  S/P Pineal cyst fenestration.     Small lump , left lower leg area,  More lipoma.    Work on weight loss  Regular exercise    No follow-ups on file.    Shira Grant MD  [facility]    Fadi Britt is a 36 year old who presents for the following health issues   -- Left ear also feels \"full\"- swollen lymph node? Into left neck area- not sure if related to neck pain  --  Also noticed a small lump on left lower leg     Patient did have a pineal cyst, status post endoscopic fenestration.  He has no more headache, denies being dizzy or lightheaded.  He has been having more fullness pressure in his ear, left more than the right.  Sinus congestion. Sometimes rining in ears  He has no fever, no chills, no postnasal drainage.  No cough no shortness of breath.    Neck Pain  Onset/Duration: \"always been there\"  Description:   Location: More left side  Radiation: into the left neck and nto the left shoulder  Intensity: mild, moderate  Progression of Symptoms:  same  Accompanying Signs & Symptoms:  Burning, tingling, prickly sensation in arm(s): no  Numbness in arm(s): no  Weakness in arm(s):  no  Fever: YES- comes and goes 101.4 on Saturday- lasted a day  Headache: YES- tension   Nausea and/or vomiting: no  History:   Trauma: no  Previous neck pain: no  Previous surgery or injections: no  Previous Imaging (MRI,X ray): no  Precipitating or alleviating factors: None  Does " "movement impact the pain:  no  Therapies tried and outcome:  Old Rx of muscle relaxer, tylenol, ibuprofen, icy hot      Review of Systems   Constitutional, HEENT, cardiovascular, pulmonary, GI, , musculoskeletal, neuro, skin, endocrine and psych systems are negative, except as otherwise noted.      Objective    /74 (BP Location: Right arm, Patient Position: Sitting, Cuff Size: Adult Regular)   Pulse 81   Temp 98.1  F (36.7  C) (Oral)   Ht 1.702 m (5' 7\")   Wt 77.7 kg (171 lb 3.2 oz)   SpO2 100%   BMI 26.81 kg/m    Body mass index is 26.81 kg/m .  Physical Exam   GENERAL: healthy, alert and no distress  HENT: ear canals and TM's normal, nose and mouth without ulcers or lesions  NECK: no adenopathy, no asymmetry, masses, or scars and thyroid normal to palpation  RESP: lungs clear to auscultation - no rales, rhonchi or wheezes  CV: regular rate and rhythm, normal S1 S2, no S3 or S4, no murmur, click or rub, no peripheral edema and peripheral pulses strong  ABDOMEN: soft, nontender, no hepatosplenomegaly, no masses and bowel sounds normal  MS: no gross musculoskeletal defects noted, no edema      Shira Grant MD        "

## 2021-11-08 ENCOUNTER — MYC MEDICAL ADVICE (OUTPATIENT)
Dept: NEUROSURGERY | Facility: CLINIC | Age: 36
End: 2021-11-08
Payer: COMMERCIAL

## 2021-11-08 NOTE — TELEPHONE ENCOUNTER
Pt s/p Right sided endoscopic pineal region biopsy  and Endoscopic third ventriculostomy on 12/23/20 by Dr. Graves.     Reports that a few days ago he noticed the incision site on right side of head is swollen and tender to the touch, but also painful when not touching it. Rates pain as 7/10. Spouse said there is an area that is slightly red with what looks like a dry patch of skin. She said the swollen part feels soft when she presses it.     Pt afebrile. Incision intact, no skin breakdown. Pt will upload a photo to [facility] Will follow-up with pt after discussing plan with Dr. Graves. Pt in agreement. No further questions at this time.

## 2021-11-08 NOTE — TELEPHONE ENCOUNTER
Dr. Graves reviewed the photo pt uploaded to [facility] and pt's symptoms. He does not see anything concerning with pt's incision, which I explained to pt. Dr. Graves would be happy to see pt in clinic with a head CT prior. Pt does not want to get a head CT, as he is concerned about radiation. He will monitor things over the next few days and call or send a [facility] message if he changes his mind about coming into clinic.

## 2021-12-07 ENCOUNTER — MYC REFILL (OUTPATIENT)
Dept: FAMILY MEDICINE | Facility: CLINIC | Age: 36
End: 2021-12-07
Payer: COMMERCIAL

## 2021-12-07 DIAGNOSIS — J01.00 ACUTE NON-RECURRENT MAXILLARY SINUSITIS: ICD-10-CM

## 2021-12-07 DIAGNOSIS — S16.1XXA STRAIN OF NECK MUSCLE, INITIAL ENCOUNTER: ICD-10-CM

## 2021-12-08 RX ORDER — PREDNISONE 20 MG/1
TABLET ORAL
Qty: 10 TABLET | Refills: 0 | Status: SHIPPED | OUTPATIENT
Start: 2021-12-08 | End: 2022-07-12

## 2021-12-08 NOTE — TELEPHONE ENCOUNTER
Routing refill request to provider for review/approval because:  Drug not on the [facility] refill protocol           Pending Prescriptions:                       Disp   Refills    predniSONE (DELTASONE) 20 MG tablet        10 tab*0        Si tab daily for 5 days        Don Sánchez RN

## 2022-01-19 NOTE — PROGRESS NOTES
Called patient for scheduled mental health therapy session today.  He had cancelled last week's intake session as he was feeling better, and declined today's session as well.  Reviewed with patient that he could call back to reschedule a therapy intake if he needed/wanted to at some point in the future.    Junaid Sanchez M.A., [facility] 11/2/20  
less than/equal to 3 secs

## 2022-01-20 DIAGNOSIS — J01.00 ACUTE NON-RECURRENT MAXILLARY SINUSITIS: Primary | ICD-10-CM

## 2022-01-20 NOTE — TELEPHONE ENCOUNTER
FUTURE VISIT INFORMATION      FUTURE VISIT INFORMATION:    Date: 2/25/22    Time: 8:30AM    [place] [facility] [place]:    Referring provider:  Shira Grant MD    Referring providers clinic:  [facility] (formerly [facility]3]    Reason for visit/diagnosis  Acute non-recurrent maxillary sinusitis // per patient // Refd by Dr Shira Grant // no outside recs    RECORDS REQUESTED FROM:       Clinic name Comments Records Status Imaging Status    [facility]] 1/20/22 referral   [facility]  1/6/21 note from Dr Galvez EPIC    Imaging 7/13/21 MR brain   12/23/20 CT Head   11/25/20 MR Brain   9/9/2020 CT Neck  [facility] PACS

## 2022-02-18 ENCOUNTER — MYC MEDICAL ADVICE (OUTPATIENT)
Dept: NEUROSURGERY | Facility: CLINIC | Age: 37
End: 2022-02-18
Payer: COMMERCIAL

## 2022-02-22 NOTE — TELEPHONE ENCOUNTER
"Pt had cyst fenestration surgery in 2020 by Dr. Graves.     Today he reports constant neck pain at the base of his neck. This was present before his surgery, and never really went away. It is becoming worse, per pt, to the point where he cannot play with his children. He occasionally experiences tingling in his arms and legs, \"electrical jolts,\" dizziness, and balance problems.     He has been doing physical therapy twice a week with no improvement. Has also tried acupuncture and chiropractic care.     He is requesting that Dr. Graves review his old imaging to see if the pain could be attributed to seen on the scans. He also has an xray that he will have sent. Pt says he is getting a full body MRI on 2/26 and will have that sent as well.     I had already reached out to our clinic schedulers to offer an appt to pt. Pt expressed understanding.     Once imaging is received I will follow-up with Dr. Graves to review and make any recommendations. Pt in agreement. No further questions at this time.       "

## 2022-02-23 ENCOUNTER — VIRTUAL VISIT (OUTPATIENT)
Dept: ORTHOPEDICS | Facility: CLINIC | Age: 37
End: 2022-02-23
Payer: COMMERCIAL

## 2022-02-23 ENCOUNTER — TELEPHONE (OUTPATIENT)
Dept: NEUROSURGERY | Facility: CLINIC | Age: 37
End: 2022-02-23

## 2022-02-23 DIAGNOSIS — R51.9 NONINTRACTABLE HEADACHE, UNSPECIFIED CHRONICITY PATTERN, UNSPECIFIED HEADACHE TYPE: ICD-10-CM

## 2022-02-23 DIAGNOSIS — E34.8 PINEAL GLAND CYST: ICD-10-CM

## 2022-02-23 DIAGNOSIS — M54.2 NECK PAIN: Primary | ICD-10-CM

## 2022-02-23 PROCEDURE — 99213 OFFICE O/P EST LOW 20 MIN: CPT | Mod: TEL | Performed by: PEDIATRICS

## 2022-02-23 NOTE — TELEPHONE ENCOUNTER
Writer NOMAN for pt to call back and schedule a follow up with Dr. Graves    Please schedule a return, in person or virtual visit with Dr. Graves for next available    Galina Mendoza

## 2022-02-23 NOTE — PROGRESS NOTES
Balwinder is a 36 year old who is being evaluated via a billable telephone visit.      What phone number would you like to be contacted at? [phone]  How would you like to obtain your AVS? Dennis  Phone call duration: 28 minutes    ASSESSMENT & PLAN    Balwinder was seen today for neck pain.    Diagnoses and all orders for this visit:    Neck pain    Nonintractable headache, unspecified chronicity pattern, unspecified headache type    Pineal gland cyst      Ok to proceed with the PRP. Did the occipital nerve injection in the interim, without desired benefit.  Can forward me the results of the whole body MRI, and may be able to get some information about ongoing neck pain from that. Otherwise, we discussed potential for repeat MRI, though previous imaging is as noted in chart and below.  Questions answered. Discussed signs and symptoms that may indicate more serious issues; the patient was instructed to seek appropriate care if noted. Balwinder indicates understanding of these issues and agrees with the plan.          See Patient Instructions  There are no Patient Instructions on file for this visit.    Chandler Leon [facility]    SUBJECTIVE- Interim History February 23, 2022    Chief Complaint   Patient presents with     Neck Pain       Balwinder Perez is a 36 year old male who is seen in f/u up for    Neck pain  Nonintractable headache, unspecified chronicity pattern, unspecified headache type  Pineal gland cyst. Since last visit on 11/23/20 patient has continued follow up with Neurosurgery and underwent surgery for the pineal gland cyst on 12/23/20.  He has also seen Neurology for his headaches following the cyst removal.   He has continued to have pain in his neck, states it has not gone away at all since his surgery.   Pain at the base of the skull and neck.   States he has not had relief with any treatment, including PT.  He states that it is pointless for him to use any  medications, as they have not been helpful in the past.    Did have a brain  MRI about 6 months ago.    Was using an oral steroid for his neck pain, which he does feel was helpful for his neck pain, but as soon as he stopped taking it the pain comes back just as strong.    **  Post-surgery, Dec 2020, still noted to have pain in back of neck.  Did have improvement in some vertigo, headaches, and visual disturbance.    Pain noted in upper neck.    Has been doing PT with physicians neck and back. Maybe ~10% relief. However, not as beneficial as desired.  Pain is deep, not necessarily localized tenderness.  Researched prolotherapy. Saw Dr Reynaga. Was advised to do chiropractic care. Saw chiro, had specialized x-ray, with concern for atlas not moving well.  Sent another neurosurgeon the x-rays, who advised that soft tissue may still be an issue.  Now considering trial of prolotherapy.    Has tried acupuncture, massage. Has also had cervical injection with Dr Ley. 4/28/21.    Has upcoming whole body scan through [facility] in [place]. Sounds to be whole body MRI. He plans to proceed with that, and can forward report and images for me to review. Otherwise, we discussed possibly updating c-spine MRI, with his concern that something perhaps was missed on original c-spine MRI and is contributing to ongoing neck pain.      Sometimes tingling in hands and legs, mild. Doesn't think much of it otherwise. Also with persistent dizziness.  Also with persistent neck pain, as noted.      REVIEW OF SYSTEMS:  Review of Systems      OBJECTIVE:  There were no vitals taken for this visit.         RADIOLOGY:  Final results and radiologist's interpretation, available in the [facility] health record.  Images were reviewed.  My personal interpretation of the performed imaging:   No clear structural abnormality in neck on previous c-spine CT and MRI.    MRI CERVICAL SPINE WITHOUT CONTRAST 11/19/2020 7:08 PM      HISTORY: Neck stiffness and pain, right  greater than left. Neck pain.     TECHNIQUE: Multiplanar, multisequence MRI of the cervical spine  without contrast.      COMPARISON: CT neck from 9/9/2020.     FINDINGS: Normal vertebral body heights, alignment, and marrow signal.  No abnormal cord signal. The paraspinous soft tissues are  unremarkable.     Segmental analysis: The craniocervical junction/C1-C2: Normal.     C2-C3: Normal disc height. No herniation. Normal facets. No spinal  canal or neural foraminal stenosis.     C3-C4: Normal disc height. No herniation. Normal facets. No spinal  canal or neural foraminal stenosis.     C4-C5: Normal disc height. No herniation. Normal facets. No spinal  canal or neural foraminal stenosis.     C5-C6: Normal disc height. No herniation. Normal facets. No spinal  canal or neural foraminal stenosis.     C6-C7: Normal disc height. No herniation. Normal facets. No spinal  canal or neural foraminal stenosis.     C7-T1: Normal disc height. No herniation. Normal facets. No spinal  canal or neural foraminal stenosis.                                                                      IMPRESSION: Unremarkable cervical spine MRI.     MEGHAN FARAH MD          CT SCAN OF THE NECK WITH CONTRAST  9/9/2020 8:31 AM      HISTORY: Acquired deformity of neck. Localized swelling, mass and  lump, neck.     TECHNIQUE:  Axial images and coronal reformations. Radiation dose for  this scan was reduced using automated exposure control, adjustment of  the mA and/or kV according to patient size, or iterative  reconstruction technique. 80 mL Isovue-370 IV.       COMPARISON: None.     FINDINGS: The area in question was marked with a radiopaque marker.  The marker overlies the right sternocleidomastoid muscle. The right  sternocleidomastoid muscle is slightly more prominent than the left  sternocleidomastoid. No discrete mass is seen within the muscle. No  enlarged cervical lymph nodes are identified in the region of the  marker. No soft tissue  mass is identified.     Visualized sinuses, nasopharynx and orbits: Normal.       Tongue, oral cavity and oropharynx:  Normal.       Hypopharynx: Normal.       Larynx and trachea: Normal.       Thyroid: Normal.     Submandibular glands: Normal.       Parotid glands: Normal.        Lymph nodes: Normal.       Vasculature: Normal.       Upper mediastinum and lungs: Normal.       Bones: Normal.                                                                      IMPRESSION:   The palpable abnormality in the right neck appears to  correspond to a slightly prominent right sternocleidomastoid muscle.  The cause of this is indeterminate. No discrete mass is seen within  the muscle. No enlarged cervical lymph nodes are identified.       JAYDEN DIAMOND MD

## 2022-02-23 NOTE — LETTER
2/23/2022         RE: Balwinder Perez  [address] [address]        Dear Colleague,    Thank you for referring your patient, Balwinder Perez, to the [facility]. Please see a copy of my visit note below.    Balwinder is a 36 year old who is being evaluated via a billable telephone visit.      What phone number would you like to be contacted at? [phone]  How would you like to obtain your AVS? MyChart  Phone call duration: 28 minutes    ASSESSMENT & PLAN    Balwinder was seen today for neck pain.    Diagnoses and all orders for this visit:    Neck pain    Nonintractable headache, unspecified chronicity pattern, unspecified headache type    Pineal gland cyst      Ok to proceed with the PRP. Did the occipital nerve injection in the interim, without desired benefit.  Can forward me the results of the whole body MRI, and may be able to get some information about ongoing neck pain from that. Otherwise, we discussed potential for repeat MRI, though previous imaging is as noted in chart and below.  Questions answered. Discussed signs and symptoms that may indicate more serious issues; the patient was instructed to seek appropriate care if noted. Balwinder indicates understanding of these issues and agrees with the plan.          See Patient Instructions  There are no Patient Instructions on file for this visit.    Chandler Leon DO  [facility]    SUBJECTIVE- Interim History February 23, 2022    Chief Complaint   Patient presents with     Neck Pain       Balwinder Perez is a 36 year old male who is seen in f/u up for    Neck pain  Nonintractable headache, unspecified chronicity pattern, unspecified headache type  Pineal gland cyst. Since last visit on 11/23/20 patient has continued follow up with Neurosurgery and underwent surgery for the pineal gland cyst on 12/23/20.  He has also seen Neurology for his headaches following the cyst removal.   He  has continued to have pain in his neck, states it has not gone away at all since his surgery.   Pain at the base of the skull and neck.   States he has not had relief with any treatment, including PT.  He states that it is pointless for him to use any medications, as they have not been helpful in the past.    Did have a brain  MRI about 6 months ago.    Was using an oral steroid for his neck pain, which he does feel was helpful for his neck pain, but as soon as he stopped taking it the pain comes back just as strong.    **  Post-surgery, Dec 2020, still noted to have pain in back of neck.  Did have improvement in some vertigo, headaches, and visual disturbance.    Pain noted in upper neck.    Has been doing PT with physicians neck and back. Maybe ~10% relief. However, not as beneficial as desired.  Pain is deep, not necessarily localized tenderness.  Researched prolotherapy. Saw Dr Reynaga. Was advised to do chiropractic care. Saw chiro, had specialized x-ray, with concern for atlas not moving well.  Sent another neurosurgeon the x-rays, who advised that soft tissue may still be an issue.  Now considering trial of prolotherapy.    Has tried acupuncture, massage. Has also had cervical injection with Dr Ley. 4/28/21.    Has upcoming whole body scan through [facility] in [place]. Sounds to be whole body MRI. He plans to proceed with that, and can forward report and images for me to review. Otherwise, we discussed possibly updating c-spine MRI, with his concern that something perhaps was missed on original c-spine MRI and is contributing to ongoing neck pain.      Sometimes tingling in hands and legs, mild. Doesn't think much of it otherwise. Also with persistent dizziness.  Also with persistent neck pain, as noted.      REVIEW OF SYSTEMS:  Review of Systems      OBJECTIVE:  There were no vitals taken for this visit.         RADIOLOGY:  Final results and radiologist's interpretation, available in the [facility]  record.  Images were reviewed.  My personal interpretation of the performed imaging:   No clear structural abnormality in neck on previous c-spine CT and MRI.    MRI CERVICAL SPINE WITHOUT CONTRAST 11/19/2020 7:08 PM      HISTORY: Neck stiffness and pain, right greater than left. Neck pain.     TECHNIQUE: Multiplanar, multisequence MRI of the cervical spine  without contrast.      COMPARISON: CT neck from 9/9/2020.     FINDINGS: Normal vertebral body heights, alignment, and marrow signal.  No abnormal cord signal. The paraspinous soft tissues are  unremarkable.     Segmental analysis: The craniocervical junction/C1-C2: Normal.     C2-C3: Normal disc height. No herniation. Normal facets. No spinal  canal or neural foraminal stenosis.     C3-C4: Normal disc height. No herniation. Normal facets. No spinal  canal or neural foraminal stenosis.     C4-C5: Normal disc height. No herniation. Normal facets. No spinal  canal or neural foraminal stenosis.     C5-C6: Normal disc height. No herniation. Normal facets. No spinal  canal or neural foraminal stenosis.     C6-C7: Normal disc height. No herniation. Normal facets. No spinal  canal or neural foraminal stenosis.     C7-T1: Normal disc height. No herniation. Normal facets. No spinal  canal or neural foraminal stenosis.                                                                      IMPRESSION: Unremarkable cervical spine MRI.     MEGHAN FARAH MD          CT SCAN OF THE NECK WITH CONTRAST  9/9/2020 8:31 AM      HISTORY: Acquired deformity of neck. Localized swelling, mass and  lump, neck.     TECHNIQUE:  Axial images and coronal reformations. Radiation dose for  this scan was reduced using automated exposure control, adjustment of  the mA and/or kV according to patient size, or iterative  reconstruction technique. 80 mL Isovue-370 IV.       COMPARISON: None.     FINDINGS: The area in question was marked with a radiopaque marker.  The marker overlies the right  sternocleidomastoid muscle. The right  sternocleidomastoid muscle is slightly more prominent than the left  sternocleidomastoid. No discrete mass is seen within the muscle. No  enlarged cervical lymph nodes are identified in the region of the  marker. No soft tissue mass is identified.     Visualized sinuses, nasopharynx and orbits: Normal.       Tongue, oral cavity and oropharynx:  Normal.       Hypopharynx: Normal.       Larynx and trachea: Normal.       Thyroid: Normal.     Submandibular glands: Normal.       Parotid glands: Normal.        Lymph nodes: Normal.       Vasculature: Normal.       Upper mediastinum and lungs: Normal.       Bones: Normal.                                                                      IMPRESSION:   The palpable abnormality in the right neck appears to  correspond to a slightly prominent right sternocleidomastoid muscle.  The cause of this is indeterminate. No discrete mass is seen within  the muscle. No enlarged cervical lymph nodes are identified.       JAYDEN DIAMOND MD                Again, thank you for allowing me to participate in the care of your patient.        Sincerely,        Chandler Leon DO

## 2022-02-25 ENCOUNTER — PRE VISIT (OUTPATIENT)
Dept: OTOLARYNGOLOGY | Facility: CLINIC | Age: 37
End: 2022-02-25

## 2022-02-26 ENCOUNTER — TRANSFERRED RECORDS (OUTPATIENT)
Dept: HEALTH INFORMATION MANAGEMENT | Facility: CLINIC | Age: 37
End: 2022-02-26
Payer: COMMERCIAL

## 2022-03-01 ENCOUNTER — MYC MEDICAL ADVICE (OUTPATIENT)
Dept: ORTHOPEDICS | Facility: CLINIC | Age: 37
End: 2022-03-01
Payer: COMMERCIAL

## 2022-03-07 NOTE — TELEPHONE ENCOUNTER
Comprehensive who body scan findings received  Placed in provider bin for review.     Mandy Nicholas MS ATC

## 2022-03-14 NOTE — TELEPHONE ENCOUNTER
Here is a portion of the report:    Spine and MSK    Spine:  There are 7 cervical spine vertebrae, 12 thoracic spine vertebrae, and 5 lumbar spine vertebrae.  The conus terminates at L1 level.  There is no evidence of degenerative disc changes or facet arthropathy.  There is normal curvature of the whole spine.  The spinal cord is of normal signal.    ===========================  Will send report for scanning to his chart.  I have not seen any images, so if those are going to be sent, I can review those as well. Otherwise, based on this report, there is no focal finding in the cervical spine that would obviously contribute to his neck pain.    Defer to neurosurgery re: some of the other findings (I.e., head and neck).        Chandler Leon, DO, CAQ

## 2022-07-08 ENCOUNTER — TELEPHONE (OUTPATIENT)
Dept: NEUROSURGERY | Facility: CLINIC | Age: 37
End: 2022-07-08

## 2022-07-12 ENCOUNTER — OFFICE VISIT (OUTPATIENT)
Dept: NEUROSURGERY | Facility: CLINIC | Age: 37
End: 2022-07-12

## 2022-07-12 ENCOUNTER — ANCILLARY PROCEDURE (OUTPATIENT)
Dept: MRI IMAGING | Facility: CLINIC | Age: 37
End: 2022-07-12
Attending: NEUROLOGICAL SURGERY
Payer: COMMERCIAL

## 2022-07-12 VITALS — OXYGEN SATURATION: 99 % | HEART RATE: 84 BPM | DIASTOLIC BLOOD PRESSURE: 72 MMHG | SYSTOLIC BLOOD PRESSURE: 112 MMHG

## 2022-07-12 DIAGNOSIS — G91.1 ACQUIRED OBSTRUCTIVE HYDROCEPHALUS (H): ICD-10-CM

## 2022-07-12 DIAGNOSIS — E34.8 CYST OF PINEAL GLAND: ICD-10-CM

## 2022-07-12 DIAGNOSIS — E34.8 CYST OF PINEAL GLAND: Primary | ICD-10-CM

## 2022-07-12 PROCEDURE — A9585 GADOBUTROL INJECTION: HCPCS | Performed by: STUDENT IN AN ORGANIZED HEALTH CARE EDUCATION/TRAINING PROGRAM

## 2022-07-12 PROCEDURE — 70553 MRI BRAIN STEM W/O & W/DYE: CPT | Mod: GC | Performed by: STUDENT IN AN ORGANIZED HEALTH CARE EDUCATION/TRAINING PROGRAM

## 2022-07-12 PROCEDURE — 99213 OFFICE O/P EST LOW 20 MIN: CPT | Performed by: NEUROLOGICAL SURGERY

## 2022-07-12 RX ORDER — GADOBUTROL 604.72 MG/ML
7.5 INJECTION INTRAVENOUS ONCE
Status: COMPLETED | OUTPATIENT
Start: 2022-07-12 | End: 2022-07-12

## 2022-07-12 RX ADMIN — GADOBUTROL 7.5 ML: 604.72 INJECTION INTRAVENOUS at 13:23

## 2022-07-12 ASSESSMENT — PAIN SCALES - GENERAL: PAINLEVEL: NO PAIN (0)

## 2022-07-12 NOTE — PATIENT INSTRUCTIONS
Please return to clinic and obtain repeat MRI in 2 years  A referral has been made for the TMD clinic with Dr. Nagel   no

## 2022-07-12 NOTE — LETTER
7/12/2022       RE: Balwinder Perez  [address] [address]     Dear Colleague,    Thank you for referring your patient, Balwinder Perez, to the [facility] [place] [facility]. Please see a copy of my visit note below.    Neurosurgery Clinic Note    Reason for Visit: pineal cyst follow up    History of Present Illness  Balwinder Perez is a 36 year old gentleman with a pineal cyst now 18 months s/p endoscopic fenestration and biopsy. The biopsy showed gliosis and he returns today for routine follow up.     Overall, he tells me that he is doing much better. He no longer has his preoperative symptoms of nausea, vertigo, or his old pressure type headache. His headaches have generally subsided but he does have pressure on the sides and sometimes on the back of his head.      Otherwise he has been productive with work, and his family is doing well.           No Known Allergies    Current Outpatient Medications   Medication     multivitamin w/minerals (THERA-VIT-M) tablet     amitriptyline (ELAVIL) 50 MG tablet     predniSONE (DELTASONE) 20 MG tablet     No current facility-administered medications for this visit.             Physical Exam  /72   Pulse 84   SpO2 99%       General: Awake, alert, oriented. Well nourished, well developed, no acute distress.  HEENT: Head normocephalic, atraumatic. Scars minimal appearance     Neurological  Awake, alert and oriented to date, time, place and person. Speech fluent.   Pupils equal, round, reactive to light.  Extraocular movement intact.  Hearing is grossly normal to finger rub.   Face symmetric.  Tongue midline.  Uvula elevates equally.    Motor: full strength throughout.  Sensation: intact to light touch    ROS: 10 point ROS neg other than the symptoms noted above in the HPI.        Imaging: my interpretations only  MRI with and without contrast of the brain 7/12/2022: previous endoscopic  tract looks well healed and without edema. Cyst appears diminished. No recurrence.        Assessment and Plan   Balwinder Perez is a 36 year old male with previous history of pineal cyst s/p fenestration. He has no further symptoms and on MRI today, he has no appearance of recurrence. We will have him follow up in 2 years with a repeat scan for continued surveillance. 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 I will refer him to my colleague in the [facility] clinic    -MRI with and without contrast in 2 years of the brain (unable to order because more than 366 days in the future)  - referral to TMD clinic (form sent)  - F/U in 2 years      Jordin Graves MD  Department of Neurosurgery  [facility]

## 2022-07-12 NOTE — PROGRESS NOTES
Neurosurgery Clinic Note    Reason for Visit: pineal cyst follow up    History of Present Illness  Balwinder Perez is a 36 year old gentleman with a pineal cyst now 18 months s/p endoscopic fenestration and biopsy. The biopsy showed gliosis and he returns today for routine follow up.     Overall, he tells me that he is doing much better. He no longer has his preoperative symptoms of nausea, vertigo, or his old pressure type headache. His headaches have generally subsided but he does have pressure on the sides and sometimes on the back of his head.      Otherwise he has been productive with work, and his family is doing well.           No Known Allergies    Current Outpatient Medications   Medication     multivitamin w/minerals (THERA-VIT-M) tablet     amitriptyline (ELAVIL) 50 MG tablet     predniSONE (DELTASONE) 20 MG tablet     No current facility-administered medications for this visit.             Physical Exam  /72   Pulse 84   SpO2 99%       General: Awake, alert, oriented. Well nourished, well developed, no acute distress.  HEENT: Head normocephalic, atraumatic. Scars minimal appearance     Neurological  Awake, alert and oriented to date, time, place and person. Speech fluent.   Pupils equal, round, reactive to light.  Extraocular movement intact.  Hearing is grossly normal to finger rub.   Face symmetric.  Tongue midline.  Uvula elevates equally.    Motor: full strength throughout.  Sensation: intact to light touch    ROS: 10 point ROS neg other than the symptoms noted above in the HPI.        Imaging: my interpretations only  MRI with and without contrast of the brain 7/12/2022: previous endoscopic tract looks well healed and without edema. Cyst appears diminished. No recurrence.        Assessment and Plan   Balwindre Perez is a 36 year old male with previous history of pineal cyst s/p fenestration. He has no further symptoms and on MRI today, he has no appearance of recurrence. We will have him follow up  in 2 years with a repeat scan for continued surveillance. 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 I will refer him to my colleague in the [facility] clinic    -MRI with and without contrast in 2 years of the brain (unable to order because more than 366 days in the future)  - referral to TMD clinic (form sent)  - F/U in 2 years    Jordin Graves MD  Department of Neurosurgery  [facility]

## 2022-09-04 ENCOUNTER — HEALTH MAINTENANCE LETTER (OUTPATIENT)
Age: 37
End: 2022-09-04

## 2022-10-04 ENCOUNTER — OFFICE VISIT (OUTPATIENT)
Dept: FAMILY MEDICINE | Facility: CLINIC | Age: 37
End: 2022-10-04

## 2022-10-04 VITALS
TEMPERATURE: 98.1 F | WEIGHT: 162 LBS | OXYGEN SATURATION: 100 % | SYSTOLIC BLOOD PRESSURE: 108 MMHG | HEART RATE: 71 BPM | BODY MASS INDEX: 25.37 KG/M2 | DIASTOLIC BLOOD PRESSURE: 65 MMHG

## 2022-10-04 DIAGNOSIS — Z23 NEEDS FLU SHOT: ICD-10-CM

## 2022-10-04 DIAGNOSIS — Z23 HIGH PRIORITY FOR 2019-NCOV VACCINE: ICD-10-CM

## 2022-10-04 DIAGNOSIS — R10.32 ABDOMINAL PAIN, LEFT LOWER QUADRANT: Primary | ICD-10-CM

## 2022-10-04 PROCEDURE — 90686 IIV4 VACC NO PRSV 0.5 ML IM: CPT | Performed by: FAMILY MEDICINE

## 2022-10-04 PROCEDURE — 0134A COVID-19,PF,MODERNA BIVALENT: CPT | Performed by: FAMILY MEDICINE

## 2022-10-04 PROCEDURE — 90471 IMMUNIZATION ADMIN: CPT | Performed by: FAMILY MEDICINE

## 2022-10-04 PROCEDURE — 91313 COVID-19,PF,MODERNA BIVALENT: CPT | Performed by: FAMILY MEDICINE

## 2022-10-04 PROCEDURE — 99214 OFFICE O/P EST MOD 30 MIN: CPT | Mod: 25 | Performed by: FAMILY MEDICINE

## 2022-10-04 ASSESSMENT — PAIN SCALES - GENERAL: PAINLEVEL: SEVERE PAIN (6)

## 2022-10-04 ASSESSMENT — ENCOUNTER SYMPTOMS: BACK PAIN: 1

## 2022-10-04 NOTE — PROGRESS NOTES
"  Assessment & Plan     Abdominal pain, left lower quadrant  The exact cause of the patient's symptoms is not apparent on physical examination or completely by history today.  I suspect it is musculoskeletal in origin and could be related to the back.  He is okay considering OTC treatment and monitoring for short period of time at this time.  If he would like to do some laboratory testing I have put in orders for a CMP and CBC.  Consideration should also be given to a CT scan of the abdomen and pelvis, particularly if symptoms fail to resolve or are accelerating over the next couple weeks.  - Comprehensive metabolic panel (BMP + Alb, Alk Phos, ALT, AST, Total. Bili, TP); Future  - CBC with platelets and differential; Future  - CT Abdomen Pelvis w Contrast; Future    Needs flu shot  Flu shot was provided today.  - INFLUENZA VACCINE IM > 6 MONTHS VALENT IIV4 (AFLURIA/FLUZONE)    High priority for 2019-nCoV vaccine  COVID booster was provided today.  - COVID-19,PF,MODERNA BIVALENT 18+Yrs             BMI:   Estimated body mass index is 25.37 kg/m  as calculated from the following:    Height as of 10/13/21: 1.702 m (5' 7\").    Weight as of this encounter: 73.5 kg (162 lb).           Return in about 3 weeks (around 10/25/2022) for Recheck pain if needed.    Diego Yuen MD  [facility]    Fadi Britt is a 37 year old, presenting for the following health issues:  Abdominal Pain, Back Pain, and Imm/Inj (COVID-19 VACCINE)      Back Pain     History of Present Illness       Reason for visit:  Abdomen pain and back pain  Symptom onset:  3-4 weeks ago  Symptoms include:  Stomach upset nausea constipation groin pain and buttocks  Symptom intensity:  Moderate  Symptom progression:  Staying the same  Had these symptoms before:  No  What makes it worse:  Standing and lifting  What makes it better:  Resting or laying    He eats 2-3 servings of fruits and vegetables daily.He consumes 0 " sweetened beverage(s) daily.He exercises with enough effort to increase his heart rate 9 or less minutes per day.  He exercises with enough effort to increase his heart rate 3 or less days per week.   He is taking medications regularly.         Patient reports for the past 2 to 3 weeks the gradual onset of pain in the area of the left flank radiating around to the left abdomen and towards the bellybutton.  It recently has developed on the right side as well.  It radiates to the back.  Kind of from the upper buttocks down to the groin on the left side.  It increases with periods of standing or doing lifting.  He does not recall any injury or triggering event.  During this time he has had some occasional nonbloody diarrhea as well as occasional constipation and has a feeling of bloating in the abdomen.  No rashes been noted.  Symptoms are not relieved with stooling.  He is doing a lot more lifting and was curious about the possibility of a hernia.  During this time symptoms have been constant but can be temporarily relieved with laying down and they have not been waking him up at night.  He is sleeping well.  He has not taken any medication for it.  He denies any hematuria, dysuria, or frequency.    Review of Systems   Musculoskeletal: Positive for back pain.      Constitutional, HEENT, cardiovascular, pulmonary, gi and gu systems are negative, except as otherwise noted.      Objective    /65 (BP Location: Right arm, Patient Position: Sitting, Cuff Size: Adult Regular)   Pulse 71   Temp 98.1  F (36.7  C) (Temporal)   Wt 73.5 kg (162 lb)   SpO2 100%   BMI 25.37 kg/m    Body mass index is 25.37 kg/m .  Physical Exam   GENERAL: healthy, alert and no distress  EYES: Eyes grossly normal to inspection, PERRL and conjunctivae and sclerae normal  NECK: no adenopathy, no asymmetry, masses, or scars and thyroid normal to palpation  RESP: lungs clear to auscultation - no rales, rhonchi or wheezes  CV: regular rate and  rhythm, normal S1 S2, no S3 or S4, no murmur, click or rub, no peripheral edema and peripheral pulses strong  ABDOMEN: soft, nontender, no hepatosplenomegaly, no masses and bowel sounds normal  ABDOMEN: soft, nontender, without hepatosplenomegaly or masses, no organomegaly or masses, bowel sounds normal and umbilicus normal   (male): normal male genitalia without lesions or urethral discharge, no hernia  MS: no gross musculoskeletal defects noted, no edema  SKIN: no suspicious lesions or rashes  NEURO: Normal strength and tone, mentation intact and speech normal  BACK: no CVA tenderness, no paralumbar tenderness  PSYCH: mentation appears normal, affect normal/bright

## 2023-01-12 ENCOUNTER — HOSPITAL ENCOUNTER (OUTPATIENT)
Dept: CT IMAGING | Facility: HOSPITAL | Age: 38
Discharge: HOME | End: 2023-01-12
Attending: FAMILY MEDICINE | Admitting: FAMILY MEDICINE
Payer: COMMERCIAL

## 2023-01-12 ENCOUNTER — OFFICE VISIT (OUTPATIENT)
Dept: FAMILY MEDICINE | Facility: CLINIC | Age: 38
End: 2023-01-12
Payer: COMMERCIAL

## 2023-01-12 VITALS
DIASTOLIC BLOOD PRESSURE: 76 MMHG | OXYGEN SATURATION: 100 % | TEMPERATURE: 97.7 F | BODY MASS INDEX: 25.69 KG/M2 | SYSTOLIC BLOOD PRESSURE: 128 MMHG | HEART RATE: 65 BPM | WEIGHT: 164 LBS

## 2023-01-12 DIAGNOSIS — R10.84 ABDOMINAL PAIN, GENERALIZED: Primary | ICD-10-CM

## 2023-01-12 DIAGNOSIS — R10.84 ABDOMINAL PAIN, GENERALIZED: ICD-10-CM

## 2023-01-12 DIAGNOSIS — R11.0 NAUSEA: ICD-10-CM

## 2023-01-12 DIAGNOSIS — M54.50 LOW BACK PAIN WITHOUT SCIATICA, UNSPECIFIED BACK PAIN LATERALITY, UNSPECIFIED CHRONICITY: ICD-10-CM

## 2023-01-12 LAB
ALBUMIN SERPL BCG-MCNC: 4.4 G/DL (ref 3.5–5.2)
ALBUMIN UR-MCNC: NEGATIVE MG/DL
ALP SERPL-CCNC: 82 U/L (ref 40–129)
ALT SERPL W P-5'-P-CCNC: 24 U/L (ref 10–50)
ANION GAP SERPL CALCULATED.3IONS-SCNC: 11 MMOL/L (ref 7–15)
APPEARANCE UR: CLEAR
AST SERPL W P-5'-P-CCNC: 32 U/L (ref 10–50)
BASOPHILS # BLD AUTO: 0 10E3/UL (ref 0–0.2)
BASOPHILS NFR BLD AUTO: 0 %
BILIRUB SERPL-MCNC: 0.3 MG/DL
BILIRUB UR QL STRIP: NEGATIVE
BUN SERPL-MCNC: 10.9 MG/DL (ref 6–20)
CALCIUM SERPL-MCNC: 9.7 MG/DL (ref 8.6–10)
CHLORIDE SERPL-SCNC: 104 MMOL/L (ref 98–107)
COLOR UR AUTO: NORMAL
CREAT SERPL-MCNC: 0.86 MG/DL (ref 0.67–1.17)
DEPRECATED HCO3 PLAS-SCNC: 26 MMOL/L (ref 22–29)
EOSINOPHIL # BLD AUTO: 0.1 10E3/UL (ref 0–0.7)
EOSINOPHIL NFR BLD AUTO: 1 %
ERYTHROCYTE [DISTWIDTH] IN BLOOD BY AUTOMATED COUNT: 13.8 % (ref 10–15)
GFR SERPL CREATININE-BSD FRML MDRD: >90 ML/MIN/1.73M2
GLUCOSE SERPL-MCNC: 87 MG/DL (ref 70–99)
GLUCOSE UR STRIP-MCNC: NEGATIVE MG/DL
HCT VFR BLD AUTO: 44.4 % (ref 40–53)
HGB BLD-MCNC: 14.7 G/DL (ref 13.3–17.7)
HGB UR QL STRIP: NEGATIVE
IMM GRANULOCYTES # BLD: 0 10E3/UL
IMM GRANULOCYTES NFR BLD: 0 %
KETONES UR STRIP-MCNC: NEGATIVE MG/DL
LEUKOCYTE ESTERASE UR QL STRIP: NEGATIVE
LYMPHOCYTES # BLD AUTO: 2.5 10E3/UL (ref 0.8–5.3)
LYMPHOCYTES NFR BLD AUTO: 34 %
MCH RBC QN AUTO: 24.8 PG (ref 26.5–33)
MCHC RBC AUTO-ENTMCNC: 33.1 G/DL (ref 31.5–36.5)
MCV RBC AUTO: 75 FL (ref 78–100)
MONOCYTES # BLD AUTO: 0.6 10E3/UL (ref 0–1.3)
MONOCYTES NFR BLD AUTO: 9 %
NEUTROPHILS # BLD AUTO: 4.1 10E3/UL (ref 1.6–8.3)
NEUTROPHILS NFR BLD AUTO: 56 %
NITRATE UR QL: NEGATIVE
PH UR STRIP: 6.5 [PH] (ref 5–8)
PLATELET # BLD AUTO: 216 10E3/UL (ref 150–450)
POTASSIUM SERPL-SCNC: 3.5 MMOL/L (ref 3.4–5.3)
PROT SERPL-MCNC: 7.5 G/DL (ref 6.4–8.3)
RBC # BLD AUTO: 5.92 10E6/UL (ref 4.4–5.9)
SODIUM SERPL-SCNC: 141 MMOL/L (ref 136–145)
SP GR UR STRIP: <=1.005 (ref 1–1.03)
UROBILINOGEN UR STRIP-ACNC: 0.2 E.U./DL
WBC # BLD AUTO: 7.3 10E3/UL (ref 4–11)

## 2023-01-12 PROCEDURE — 80053 COMPREHEN METABOLIC PANEL: CPT | Performed by: FAMILY MEDICINE

## 2023-01-12 PROCEDURE — 74177 CT ABD & PELVIS W/CONTRAST: CPT

## 2023-01-12 PROCEDURE — 85025 COMPLETE CBC W/AUTO DIFF WBC: CPT | Performed by: FAMILY MEDICINE

## 2023-01-12 PROCEDURE — 99214 OFFICE O/P EST MOD 30 MIN: CPT | Performed by: FAMILY MEDICINE

## 2023-01-12 PROCEDURE — 36415 COLL VENOUS BLD VENIPUNCTURE: CPT | Performed by: FAMILY MEDICINE

## 2023-01-12 PROCEDURE — 250N000011 HC RX IP 250 OP 636: Performed by: FAMILY MEDICINE

## 2023-01-12 PROCEDURE — 81003 URINALYSIS AUTO W/O SCOPE: CPT | Performed by: FAMILY MEDICINE

## 2023-01-12 RX ORDER — IOPAMIDOL 755 MG/ML
100 INJECTION, SOLUTION INTRAVASCULAR ONCE
Status: COMPLETED | OUTPATIENT
Start: 2023-01-12 | End: 2023-01-12

## 2023-01-12 RX ADMIN — IOPAMIDOL 100 ML: 755 INJECTION, SOLUTION INTRAVENOUS at 17:01

## 2023-01-12 NOTE — PROGRESS NOTES
Clinical Decision Making:    At the end of the encounter, I discussed results, diagnosis, medications. Discussed red flags for immediate return to clinic/ER, as well as indications for follow up if no improvement. Patient understood and agreed to plan. Patient was stable for discharge.      ICD-10-CM    1. Abdominal pain, generalized  R10.84 UA macro with reflex to Microscopic and Culture - Clinc Collect     CBC with platelets and differential     Comprehensive metabolic panel (BMP + Alb, Alk Phos, ALT, AST, Total. Bili, TP)     CT Abdomen Pelvis w Contrast     CBC with platelets and differential     Comprehensive metabolic panel (BMP + Alb, Alk Phos, ALT, AST, Total. Bili, TP)      2. Nausea  R11.0 UA macro with reflex to Microscopic and Culture - Clinc Collect     CBC with platelets and differential     Comprehensive metabolic panel (BMP + Alb, Alk Phos, ALT, AST, Total. Bili, TP)     CT Abdomen Pelvis w Contrast     CBC with platelets and differential     Comprehensive metabolic panel (BMP + Alb, Alk Phos, ALT, AST, Total. Bili, TP)      3. Low back pain without sciatica, unspecified back pain laterality, unspecified chronicity  M54.50         Discussed results with patient.  CMP is still pending.  Recommended continuing with the exercises for his back that he is doing at home.  Offered physical therapy but patient declined at this time.  Certainly follow-up with any worsening symptoms.  Patient verbalized understanding.      There are no Patient Instructions on file for this visit.   No follow-ups on file.      chief complaint    HPI:  Balwinder Perez is a 37 year old male who presents today complaining of abdominal pain around the bellybutton which radiates to the left side and somewhat to the right side as well.  He has been having the pain for about 3 months.  He was seen at a visit on October 4 and did not follow-up for blood work or scan.  He notes that the pain has now become persistent for about the last 3  weeks.  He is also noticing nausea as well as abdominal bloating.  He denies diarrhea, constipation, blood in the stool.  He has had no injury or fall.  There is no change in the pain with any eating.  Different types of movement make the pain feel worse.  Nothing really makes it feel better although he has tried a CBD cream that may have helped a little bit.  He also reports a stiff back and low back pain.  He works at [employer] and does a lot of lifting of boxes.  Last night he was playing soccer and the abdominal pain felt worse.  No fevers or chills.  No vomiting.    History obtained from chart review and the patient.    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,       Social History     Tobacco Use     Smoking status: Former     Packs/day: 0.00     Years: 0.00     Pack years: 0.00     Types: Cigarettes     Quit date: 12/1/2012     Years since quitting: 10.1     Smokeless tobacco: Never   Substance Use Topics     Alcohol use: Not Currently       Review of systems  negative except listed in HPI    Vitals:    01/12/23 1606   BP: 128/76   Pulse: 65   Temp: 97.7  F (36.5  C)   TempSrc: Oral   SpO2: 100%   Weight: 74.4 kg (164 lb)       Physical Exam  Vitals noted and within normal limits  In general he is alert, oriented, and in no acute distress  Heart is a regular rate and rhythm with no murmurs  Lungs are clear to auscultation bilaterally  There is no tenderness to palpation of the thoracic and lumbar spine.  No pain to palpation of the right and left paraspinal muscles in the thoracic and lumbar area.  Abdomen has normal bowel sounds.  Soft and  nondistended.  He reports tenderness at the umbilicus and the left lower quadrant.  There is no guarding.  There is no tenderness at the right lower quadrant, right upper quadrant or left upper quadrant.  CBC: Within normal limits.  Low MCV.  CMP:pending  UA:clear  CT abdomen and pelvis: No significant finding to explain symptoms.  Reviewed the complete CT abdomen pelvis results with the patient in the room and his wife on the telephone.  Results for orders placed or performed during the hospital encounter of 01/12/23   CT Abdomen Pelvis w Contrast     Status: None    Narrative    EXAM: CT ABDOMEN PELVIS W CONTRAST  LOCATION: [facility]  DATE/TIME: 1/12/2023 5:02 PM    INDICATION:  Abdominal pain, generalized, Nausea  COMPARISON: None.  TECHNIQUE: CT scan of the abdomen and pelvis was performed following injection of IV contrast. Multiplanar reformats were obtained. Dose reduction techniques were used.  CONTRAST: 100 ml Isovue 370    FINDINGS:   LOWER CHEST: Unremarkable.    HEPATOBILIARY: Well-circumscribed hypodense hepatic lesions, likely cysts, no specific follow-up recommended.    PANCREAS: Normal.    SPLEEN: Normal.    ADRENAL GLANDS: Normal.    KIDNEYS/BLADDER: Simple appearing bilateral renal cysts, no specific follow-up recommended. No hydronephrosis. Urinary bladder demonstrates possible wall thickening and mucosal hyperenhancement. No significant surrounding fat stranding.    BOWEL: No obstruction or inflammatory change. Normal appendix.    LYMPH NODES: Normal.    VASCULATURE: Unremarkable.    PELVIC ORGANS: Normal.    MUSCULOSKELETAL: No acute bony abnormality.        Impression    IMPRESSION:   1.  Boderline bladder wall thickening and mucosal hyperenhancement, mild cystitis is possible, recommend correlation with urinalysis.  2.  No additional visualized explanation for patient's symptoms.   Results for orders placed or performed in visit on 01/12/23   UA macro with reflex to  Microscopic and Culture - Clinc Collect     Status: Normal    Specimen: Urine, Clean Catch   Result Value Ref Range    Color Urine Light Yellow Colorless, Straw, Light Yellow, Yellow    Appearance Urine Clear Clear    Glucose Urine Negative Negative mg/dL    Bilirubin Urine Negative Negative    Ketones Urine Negative Negative mg/dL    Specific Gravity Urine <=1.005 1.005 - 1.030    Blood Urine Negative Negative    pH Urine 6.5 5.0 - 8.0    Protein Albumin Urine Negative Negative mg/dL    Urobilinogen Urine 0.2 0.2, 1.0 E.U./dL    Nitrite Urine Negative Negative    Leukocyte Esterase Urine Negative Negative    Narrative    Microscopic not indicated   CBC with platelets and differential     Status: Abnormal   Result Value Ref Range    WBC Count 7.3 4.0 - 11.0 10e3/uL    RBC Count 5.92 (H) 4.40 - 5.90 10e6/uL    Hemoglobin 14.7 13.3 - 17.7 g/dL    Hematocrit 44.4 40.0 - 53.0 %    MCV 75 (L) 78 - 100 fL    MCH 24.8 (L) 26.5 - 33.0 pg    MCHC 33.1 31.5 - 36.5 g/dL    RDW 13.8 10.0 - 15.0 %    Platelet Count 216 150 - 450 10e3/uL    % Neutrophils 56 %    % Lymphocytes 34 %    % Monocytes 9 %    % Eosinophils 1 %    % Basophils 0 %    % Immature Granulocytes 0 %    Absolute Neutrophils 4.1 1.6 - 8.3 10e3/uL    Absolute Lymphocytes 2.5 0.8 - 5.3 10e3/uL    Absolute Monocytes 0.6 0.0 - 1.3 10e3/uL    Absolute Eosinophils 0.1 0.0 - 0.7 10e3/uL    Absolute Basophils 0.0 0.0 - 0.2 10e3/uL    Absolute Immature Granulocytes 0.0 <=0.4 10e3/uL   CBC with platelets and differential     Status: Abnormal    Narrative    The following orders were created for panel order CBC with platelets and differential.  Procedure                               Abnormality         Status                     ---------                               -----------         ------                     CBC with platelets and d...[[id number]]  Abnormal            Final result                 Please view results for these tests on the individual orders.

## 2023-07-25 ENCOUNTER — OFFICE VISIT (OUTPATIENT)
Dept: FAMILY MEDICINE | Facility: CLINIC | Age: 38
End: 2023-07-25
Payer: COMMERCIAL

## 2023-07-25 VITALS
DIASTOLIC BLOOD PRESSURE: 73 MMHG | BODY MASS INDEX: 26.31 KG/M2 | OXYGEN SATURATION: 99 % | TEMPERATURE: 98 F | HEART RATE: 65 BPM | RESPIRATION RATE: 16 BRPM | SYSTOLIC BLOOD PRESSURE: 107 MMHG | WEIGHT: 168 LBS

## 2023-07-25 DIAGNOSIS — Z83.1 FAMILY HISTORY OF PINWORM INFECTION: Primary | ICD-10-CM

## 2023-07-25 PROCEDURE — 99213 OFFICE O/P EST LOW 20 MIN: CPT | Performed by: PHYSICIAN ASSISTANT

## 2023-07-25 NOTE — PROGRESS NOTES
Assessment & Plan     1. Family history of pinworm infection      - mebendazole (VERMOX) 100 MG chewable tablet; Take one tablet (100mg) now. Repeat after 2 weeks for a total of 2 doses  Dispense: 2 tablet; Refill: 0       Patient Instructions   Take 1 tablet today.  Take another tablet after 2 weeks for a total of 2 doses.      Return if symptoms worsen or fail to improve, for Follow up.    At the end of the encounter, I discussed results, diagnosis, medications. Discussed red flags for immediate return to clinic/ER, as well as indications for follow up if no improvement. Patient understood and agreed to plan. Patient was stable for discharge.    Fadi Britt is a 37 year old male who presents to clinic today with wife for the following health issues:  Chief Complaint   Patient presents with     Pin Worms     Exposed to pin worms     HPI    Patient was exposed to be no infection.  His son is being treated.  Entire family is being treated as a precaution.  Patient is asymptomatic.    Review of Systems   All other systems reviewed and are negative.      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   Pulmonary:      Effort: Pulmonary effort is normal.   Neurological:      Mental Status: He is alert.   Psychiatric:         Mood and Affect: Mood normal.         Behavior: Behavior normal.              Patricia Magana PA-C

## 2023-07-26 ENCOUNTER — TELEPHONE (OUTPATIENT)
Dept: FAMILY MEDICINE | Facility: CLINIC | Age: 38
End: 2023-07-26
Payer: COMMERCIAL

## 2023-07-26 NOTE — TELEPHONE ENCOUNTER
Reason for Call:  Other call back    Detailed comments: patients  called and would like [facility]  provider to callback on medication for Pin Worm prescribed.    Thank you.    Phone Number Patient can be reached at: Home number on file [phone] (home)    Best Time: any    Can we leave a detailed message on this number? YES    Call taken on 7/26/2023 at 10:18 AM by Vida Ramirez

## 2023-07-27 DIAGNOSIS — E34.8 CYST OF PINEAL GLAND: Primary | ICD-10-CM

## 2023-07-27 NOTE — TELEPHONE ENCOUNTER
Called Georgiana/wife and talked to her on behalf of family with questions about albendazole.  Unfortunately to treat all 4 people in her household, it would have cost $4200.  Patient was able to locate pyrantel over-the-counter, wondering if this will be effective.  Patient states that there is dosing instructions on there for various ages/weights.    Explained that this okay for pinworms and they can follow the instructions.  Is much cheaper, $12 per package and they can disregard the albendazole prescription.    Can call and ask pharmacist if there are questions about dosing at her pharmacy.  Patient says she understands and will use pyrantel OTC instead.

## 2023-08-08 ENCOUNTER — E-VISIT (OUTPATIENT)
Dept: FAMILY MEDICINE | Facility: CLINIC | Age: 38
End: 2023-08-08
Payer: COMMERCIAL

## 2023-08-08 DIAGNOSIS — J02.0 ACUTE STREPTOCOCCAL PHARYNGITIS: Primary | ICD-10-CM

## 2023-08-08 PROCEDURE — 99421 OL DIG E/M SVC 5-10 MIN: CPT | Performed by: FAMILY MEDICINE

## 2023-08-08 RX ORDER — AMOXICILLIN 500 MG/1
1000 CAPSULE ORAL 2 TIMES DAILY
Qty: 40 CAPSULE | Refills: 0 | Status: SHIPPED | OUTPATIENT
Start: 2023-08-08 | End: 2023-08-18

## 2023-08-08 NOTE — PATIENT INSTRUCTIONS
Thank you for choosing us for your care. I have placed an order for a prescription so that you can start treatment. View your full visit summary for details by clicking on the link below. Your pharmacist will able to address any questions you may have about the medication.     If you're not feeling better within 5-7 days, please schedule an appointment.  You can schedule an appointment right here in [facility], or call [phone]  If the visit is for the same symptoms as your eVisit, we'll refund the cost of your eVisit if seen within seven days.

## 2023-09-30 ENCOUNTER — HEALTH MAINTENANCE LETTER (OUTPATIENT)
Age: 38
End: 2023-09-30

## 2024-11-23 ENCOUNTER — HEALTH MAINTENANCE LETTER (OUTPATIENT)
Age: 39
End: 2024-11-23

## (undated) DEVICE — STRAP UNIVERSAL POSITIONING 2-PIECE 4X47X76" 91-287

## (undated) DEVICE — BUR STRK CARBIDE ROUND 5.0MM 5820-110-050C

## (undated) DEVICE — SU MONOCRYL 4-0 PS-2 27" UND Y426H

## (undated) DEVICE — PREP SKIN SCRUB TRAY 4461A

## (undated) DEVICE — LINEN TOWEL PACK X5 5464

## (undated) DEVICE — SYR 01ML 27GA 0.5" NDL TBC 309623

## (undated) DEVICE — ESU GROUND PAD ADULT W/CORD E7507

## (undated) DEVICE — PREP POVIDONE IODINE SCRUB 7.5% 4OZ APL82212

## (undated) DEVICE — TEST TUBE W/SCREW CAP 17361

## (undated) DEVICE — CATH TRAY FOLEY SURESTEP 16FR W/URNE MTR STLK LATEX A303316A

## (undated) DEVICE — SYR 03ML LL W/O NDL 309657

## (undated) DEVICE — DRSG TELFA 3X8" 1238

## (undated) DEVICE — PREP CHLORAPREP CLEAR 3ML 260400

## (undated) DEVICE — SPONGE SURGIFOAM 100 1974

## (undated) DEVICE — SPONGE SURGIFOAM 12 1972

## (undated) DEVICE — PAD CHUX UNDERPAD 23X24" 7136

## (undated) DEVICE — DRSG KERLIX 4 1/2"X4YDS ROLL 6715

## (undated) DEVICE — PREP CHLORAPREP 26ML TINTED ORANGE  260815

## (undated) DEVICE — SU MONOCRYL 3-0 PS-1 27" Y936H

## (undated) DEVICE — PREP POVIDONE IODINE SOLUTION 10% 4OZ

## (undated) DEVICE — STPL SKIN 35W ROTATING HEAD PRW35

## (undated) DEVICE — SPONGE COTTONOID 1/2X1/2" 20-04S

## (undated) DEVICE — SHUNT STYLET 23CM AXIEM NAVIGATION SYSTEM 9735428

## (undated) DEVICE — DRAPE POUCH IRR 1016

## (undated) DEVICE — ADH SKIN CLOSURE PREMIERPRO EXOFIN 1.0ML 3470

## (undated) DEVICE — LABEL MEDICATION SYSTEM 3303-P

## (undated) DEVICE — SYR EAR BULB 3OZ 0035830

## (undated) DEVICE — PACK NEURO MINOR UMMC SNE32MNMU4

## (undated) DEVICE — Device

## (undated) DEVICE — DRSG PRIMAPORE 02X3" 7133

## (undated) DEVICE — DRAPE SHEET REV FOLD 3/4 9349

## (undated) DEVICE — DRAPE POUCH INSTRUMENT 1018

## (undated) DEVICE — SU VICRYL 2-0 CT-2 CR 8X18" J726D

## (undated) DEVICE — SYR 10ML LL W/O NDL 302995

## (undated) DEVICE — NDL 25GA 5/8" 305122

## (undated) DEVICE — SOL RINGERS LACTATED 1000ML BAG 07953-09

## (undated) DEVICE — CLARUS TUBING IRRIGATION 5150-002

## (undated) DEVICE — BALLOON 3FR MINI COMPRESSION G10324

## (undated) DEVICE — SOL WATER IRRIG 1000ML BOTTLE 2F7114

## (undated) DEVICE — NDL BLUNT 17GA 1.5" 8881202330

## (undated) DEVICE — RETR ELASTIC STAYS LONE STAR BLUNT DUAL LEAD 3550-1G

## (undated) DEVICE — LINEN TOWEL PACK X6 WHITE 5487

## (undated) DEVICE — PREP CHLORAPREP ORANGE 3ML  260415

## (undated) DEVICE — PROTECTOR ARM ONE-STEP TRENDELENBURG 40418

## (undated) DEVICE — SUCTION MANIFOLD DORNOCH ULTRA CART UL-CL500

## (undated) RX ORDER — FENTANYL CITRATE 50 UG/ML
INJECTION, SOLUTION INTRAMUSCULAR; INTRAVENOUS
Status: DISPENSED
Start: 2020-12-23

## (undated) RX ORDER — HYDROMORPHONE HYDROCHLORIDE 1 MG/ML
INJECTION, SOLUTION INTRAMUSCULAR; INTRAVENOUS; SUBCUTANEOUS
Status: DISPENSED
Start: 2020-12-23

## (undated) RX ORDER — ACETAMINOPHEN 325 MG/1
TABLET ORAL
Status: DISPENSED
Start: 2020-12-23

## (undated) RX ORDER — TRIAMCINOLONE ACETONIDE 40 MG/ML
INJECTION, SUSPENSION INTRA-ARTICULAR; INTRAMUSCULAR
Status: DISPENSED
Start: 2021-04-28

## (undated) RX ORDER — ONDANSETRON 2 MG/ML
INJECTION INTRAMUSCULAR; INTRAVENOUS
Status: DISPENSED
Start: 2020-12-23

## (undated) RX ORDER — GENTAMICIN 40 MG/ML
INJECTION, SOLUTION INTRAMUSCULAR; INTRAVENOUS
Status: DISPENSED
Start: 2020-12-23

## (undated) RX ORDER — BUPIVACAINE HYDROCHLORIDE AND EPINEPHRINE 5; 5 MG/ML; UG/ML
INJECTION, SOLUTION EPIDURAL; INTRACAUDAL; PERINEURAL
Status: DISPENSED
Start: 2020-12-23

## (undated) RX ORDER — SODIUM CHLORIDE 9 MG/ML
INJECTION, SOLUTION INTRAVENOUS
Status: DISPENSED
Start: 2020-12-23

## (undated) RX ORDER — CEFAZOLIN SODIUM 2 G/100ML
INJECTION, SOLUTION INTRAVENOUS
Status: DISPENSED
Start: 2020-12-23

## (undated) RX ORDER — OXYCODONE HYDROCHLORIDE 5 MG/1
TABLET ORAL
Status: DISPENSED
Start: 2020-12-23

## (undated) RX ORDER — BUPIVACAINE HYDROCHLORIDE 5 MG/ML
INJECTION, SOLUTION EPIDURAL; INTRACAUDAL
Status: DISPENSED
Start: 2021-04-28

## (undated) RX ORDER — LIDOCAINE HYDROCHLORIDE 10 MG/ML
INJECTION, SOLUTION EPIDURAL; INFILTRATION; INTRACAUDAL; PERINEURAL
Status: DISPENSED
Start: 2021-04-28